# Patient Record
Sex: MALE | Race: WHITE | Employment: FULL TIME
[De-identification: names, ages, dates, MRNs, and addresses within clinical notes are randomized per-mention and may not be internally consistent; named-entity substitution may affect disease eponyms.]

---

## 2017-01-06 ENCOUNTER — OFFICE VISIT (OUTPATIENT)
Dept: FAMILY MEDICINE CLINIC | Facility: CLINIC | Age: 57
End: 2017-01-06

## 2017-01-06 VITALS — OXYGEN SATURATION: 98 % | SYSTOLIC BLOOD PRESSURE: 132 MMHG | DIASTOLIC BLOOD PRESSURE: 92 MMHG | HEART RATE: 82 BPM

## 2017-01-06 DIAGNOSIS — N43.3 HYDROCELE IN ADULT: ICD-10-CM

## 2017-01-06 DIAGNOSIS — J40 BRONCHITIS: Primary | ICD-10-CM

## 2017-01-06 PROCEDURE — 99213 OFFICE O/P EST LOW 20 MIN: CPT | Performed by: FAMILY MEDICINE

## 2017-01-06 RX ORDER — AZITHROMYCIN 250 MG/1
TABLET, FILM COATED ORAL
Qty: 1 PACKET | Refills: 0 | Status: SHIPPED | OUTPATIENT
Start: 2017-01-06 | End: 2017-06-26 | Stop reason: ALTCHOICE

## 2017-01-06 ASSESSMENT — ENCOUNTER SYMPTOMS
WHEEZING: 0
DIARRHEA: 0
CONSTIPATION: 0
COUGH: 1
ABDOMINAL PAIN: 0
SINUS PRESSURE: 1
SORE THROAT: 1
SHORTNESS OF BREATH: 1
BACK PAIN: 0
BLOOD IN STOOL: 0

## 2017-01-24 ENCOUNTER — OFFICE VISIT (OUTPATIENT)
Dept: FAMILY MEDICINE CLINIC | Facility: CLINIC | Age: 57
End: 2017-01-24

## 2017-01-24 VITALS
SYSTOLIC BLOOD PRESSURE: 134 MMHG | HEART RATE: 93 BPM | DIASTOLIC BLOOD PRESSURE: 86 MMHG | BODY MASS INDEX: 31.71 KG/M2 | WEIGHT: 247 LBS

## 2017-01-24 DIAGNOSIS — G89.29 CHRONIC LEFT-SIDED LOW BACK PAIN WITH LEFT-SIDED SCIATICA: Primary | ICD-10-CM

## 2017-01-24 DIAGNOSIS — M54.42 CHRONIC LEFT-SIDED LOW BACK PAIN WITH LEFT-SIDED SCIATICA: Primary | ICD-10-CM

## 2017-01-24 DIAGNOSIS — E78.5 DYSLIPIDEMIA: ICD-10-CM

## 2017-01-24 PROCEDURE — 3017F COLORECTAL CA SCREEN DOC REV: CPT | Performed by: FAMILY MEDICINE

## 2017-01-24 PROCEDURE — G8427 DOCREV CUR MEDS BY ELIG CLIN: HCPCS | Performed by: FAMILY MEDICINE

## 2017-01-24 PROCEDURE — G8419 CALC BMI OUT NRM PARAM NOF/U: HCPCS | Performed by: FAMILY MEDICINE

## 2017-01-24 PROCEDURE — 1036F TOBACCO NON-USER: CPT | Performed by: FAMILY MEDICINE

## 2017-01-24 PROCEDURE — 99213 OFFICE O/P EST LOW 20 MIN: CPT | Performed by: FAMILY MEDICINE

## 2017-01-24 PROCEDURE — G8484 FLU IMMUNIZE NO ADMIN: HCPCS | Performed by: FAMILY MEDICINE

## 2017-01-24 PROCEDURE — 96372 THER/PROPH/DIAG INJ SC/IM: CPT | Performed by: FAMILY MEDICINE

## 2017-01-24 RX ORDER — PREDNISONE 20 MG/1
TABLET ORAL
Qty: 20 TABLET | Refills: 0 | Status: SHIPPED | OUTPATIENT
Start: 2017-01-24 | End: 2017-06-26 | Stop reason: ALTCHOICE

## 2017-01-24 RX ORDER — OXYCODONE HYDROCHLORIDE AND ACETAMINOPHEN 5; 325 MG/1; MG/1
1 TABLET ORAL EVERY 4 HOURS PRN
Qty: 40 TABLET | Refills: 0 | Status: SHIPPED | OUTPATIENT
Start: 2017-01-24 | End: 2017-11-21 | Stop reason: SDUPTHER

## 2017-01-24 RX ORDER — KETOROLAC TROMETHAMINE 30 MG/ML
60 INJECTION, SOLUTION INTRAMUSCULAR; INTRAVENOUS ONCE
Status: COMPLETED | OUTPATIENT
Start: 2017-01-24 | End: 2017-01-24

## 2017-01-24 RX ADMIN — KETOROLAC TROMETHAMINE 60 MG: 30 INJECTION, SOLUTION INTRAMUSCULAR; INTRAVENOUS at 16:00

## 2017-01-24 ASSESSMENT — ENCOUNTER SYMPTOMS
WHEEZING: 0
SHORTNESS OF BREATH: 0
BLOOD IN STOOL: 0
CONSTIPATION: 0
BACK PAIN: 1
COUGH: 0
DIARRHEA: 0
ABDOMINAL PAIN: 0

## 2017-01-30 ENCOUNTER — OFFICE VISIT (OUTPATIENT)
Dept: FAMILY MEDICINE CLINIC | Facility: CLINIC | Age: 57
End: 2017-01-30

## 2017-01-30 VITALS
DIASTOLIC BLOOD PRESSURE: 88 MMHG | SYSTOLIC BLOOD PRESSURE: 124 MMHG | HEART RATE: 72 BPM | OXYGEN SATURATION: 96 % | TEMPERATURE: 98.2 F

## 2017-01-30 DIAGNOSIS — J01.00 ACUTE MAXILLARY SINUSITIS, RECURRENCE NOT SPECIFIED: Primary | ICD-10-CM

## 2017-01-30 PROCEDURE — 99213 OFFICE O/P EST LOW 20 MIN: CPT | Performed by: FAMILY MEDICINE

## 2017-01-30 PROCEDURE — 3017F COLORECTAL CA SCREEN DOC REV: CPT | Performed by: FAMILY MEDICINE

## 2017-01-30 PROCEDURE — G8427 DOCREV CUR MEDS BY ELIG CLIN: HCPCS | Performed by: FAMILY MEDICINE

## 2017-01-30 PROCEDURE — G8484 FLU IMMUNIZE NO ADMIN: HCPCS | Performed by: FAMILY MEDICINE

## 2017-01-30 PROCEDURE — 1036F TOBACCO NON-USER: CPT | Performed by: FAMILY MEDICINE

## 2017-01-30 PROCEDURE — G8419 CALC BMI OUT NRM PARAM NOF/U: HCPCS | Performed by: FAMILY MEDICINE

## 2017-01-30 RX ORDER — AMOXICILLIN 875 MG/1
875 TABLET, COATED ORAL 2 TIMES DAILY
Qty: 20 TABLET | Refills: 0 | Status: SHIPPED | OUTPATIENT
Start: 2017-01-30 | End: 2017-02-09

## 2017-01-30 ASSESSMENT — ENCOUNTER SYMPTOMS
BACK PAIN: 0
SORE THROAT: 1
BLOOD IN STOOL: 0
VOICE CHANGE: 0
RHINORRHEA: 1
CONSTIPATION: 0
SINUS PRESSURE: 1
ABDOMINAL PAIN: 0
COUGH: 0
WHEEZING: 0
SHORTNESS OF BREATH: 0
DIARRHEA: 0

## 2017-02-13 DIAGNOSIS — G43.009 MIGRAINE WITHOUT AURA AND WITHOUT STATUS MIGRAINOSUS, NOT INTRACTABLE: ICD-10-CM

## 2017-02-13 RX ORDER — SUMATRIPTAN 100 MG/1
TABLET, FILM COATED ORAL
Qty: 9 TABLET | Refills: 3 | Status: SHIPPED | OUTPATIENT
Start: 2017-02-13 | End: 2017-04-04 | Stop reason: SDUPTHER

## 2017-04-04 DIAGNOSIS — G43.009 MIGRAINE WITHOUT AURA AND WITHOUT STATUS MIGRAINOSUS, NOT INTRACTABLE: ICD-10-CM

## 2017-04-05 RX ORDER — SUMATRIPTAN 100 MG/1
TABLET, FILM COATED ORAL
Qty: 9 TABLET | Refills: 3 | Status: SHIPPED | OUTPATIENT
Start: 2017-04-05 | End: 2017-05-14 | Stop reason: SDUPTHER

## 2017-05-14 DIAGNOSIS — G43.009 MIGRAINE WITHOUT AURA AND WITHOUT STATUS MIGRAINOSUS, NOT INTRACTABLE: ICD-10-CM

## 2017-05-15 RX ORDER — SUMATRIPTAN 100 MG/1
TABLET, FILM COATED ORAL
Qty: 9 TABLET | Refills: 3 | Status: SHIPPED | OUTPATIENT
Start: 2017-05-15 | End: 2017-06-25 | Stop reason: SDUPTHER

## 2017-06-25 DIAGNOSIS — G43.009 MIGRAINE WITHOUT AURA AND WITHOUT STATUS MIGRAINOSUS, NOT INTRACTABLE: ICD-10-CM

## 2017-06-26 ENCOUNTER — OFFICE VISIT (OUTPATIENT)
Dept: FAMILY MEDICINE CLINIC | Age: 57
End: 2017-06-26
Payer: COMMERCIAL

## 2017-06-26 VITALS
HEART RATE: 77 BPM | BODY MASS INDEX: 30.16 KG/M2 | WEIGHT: 235 LBS | SYSTOLIC BLOOD PRESSURE: 120 MMHG | HEIGHT: 74 IN | DIASTOLIC BLOOD PRESSURE: 68 MMHG

## 2017-06-26 DIAGNOSIS — R51.9 CHRONIC DAILY HEADACHE: Primary | ICD-10-CM

## 2017-06-26 PROCEDURE — 99213 OFFICE O/P EST LOW 20 MIN: CPT | Performed by: FAMILY MEDICINE

## 2017-06-26 RX ORDER — TOPIRAMATE 50 MG/1
50 TABLET, FILM COATED ORAL DAILY
Qty: 30 TABLET | Refills: 3 | Status: SHIPPED | OUTPATIENT
Start: 2017-06-26 | End: 2017-08-29 | Stop reason: SDUPTHER

## 2017-06-26 RX ORDER — SUMATRIPTAN 100 MG/1
TABLET, FILM COATED ORAL
Qty: 9 TABLET | Refills: 3 | Status: SHIPPED | OUTPATIENT
Start: 2017-06-26 | End: 2017-08-07 | Stop reason: SDUPTHER

## 2017-06-26 ASSESSMENT — ENCOUNTER SYMPTOMS
SHORTNESS OF BREATH: 0
DIARRHEA: 0
ABDOMINAL PAIN: 0
VOMITING: 1
BACK PAIN: 0
COUGH: 0
WHEEZING: 0
BLOOD IN STOOL: 0
NAUSEA: 1
CONSTIPATION: 0
PHOTOPHOBIA: 1

## 2017-07-05 RX ORDER — ATORVASTATIN CALCIUM 20 MG/1
TABLET, FILM COATED ORAL
Qty: 90 TABLET | Refills: 3 | Status: SHIPPED | OUTPATIENT
Start: 2017-07-05 | End: 2018-09-24 | Stop reason: SDUPTHER

## 2017-08-07 DIAGNOSIS — G43.009 MIGRAINE WITHOUT AURA AND WITHOUT STATUS MIGRAINOSUS, NOT INTRACTABLE: ICD-10-CM

## 2017-08-08 RX ORDER — SUMATRIPTAN 100 MG/1
TABLET, FILM COATED ORAL
Qty: 9 TABLET | Refills: 3 | Status: SHIPPED | OUTPATIENT
Start: 2017-08-08 | End: 2017-09-25 | Stop reason: SDUPTHER

## 2017-08-29 DIAGNOSIS — R51.9 CHRONIC DAILY HEADACHE: ICD-10-CM

## 2017-08-29 RX ORDER — TOPIRAMATE 50 MG/1
50 TABLET, FILM COATED ORAL DAILY
Qty: 90 TABLET | Refills: 3 | Status: SHIPPED | OUTPATIENT
Start: 2017-08-29 | End: 2018-10-28 | Stop reason: SDUPTHER

## 2017-09-25 DIAGNOSIS — G43.009 MIGRAINE WITHOUT AURA AND WITHOUT STATUS MIGRAINOSUS, NOT INTRACTABLE: ICD-10-CM

## 2017-09-25 RX ORDER — SUMATRIPTAN 100 MG/1
TABLET, FILM COATED ORAL
Qty: 9 TABLET | Refills: 3 | Status: SHIPPED | OUTPATIENT
Start: 2017-09-25 | End: 2017-11-05 | Stop reason: SDUPTHER

## 2017-11-05 DIAGNOSIS — G43.009 MIGRAINE WITHOUT AURA AND WITHOUT STATUS MIGRAINOSUS, NOT INTRACTABLE: ICD-10-CM

## 2017-11-06 RX ORDER — SUMATRIPTAN 100 MG/1
TABLET, FILM COATED ORAL
Qty: 9 TABLET | Refills: 3 | Status: SHIPPED | OUTPATIENT
Start: 2017-11-06 | End: 2017-12-16 | Stop reason: SDUPTHER

## 2017-11-06 NOTE — TELEPHONE ENCOUNTER
Health Maintenance   Topic Date Due    Hepatitis C screen  1960    HIV screen  04/02/1977    DTaP/Tdap/Td vaccine (1 - Tdap) 04/02/1981    Flu vaccine (1) 09/01/2017    Lipid screen  05/25/2021    Colon cancer screen colonoscopy  06/06/2025       No results found for: LABA1C          ( goal A1C is < 7)   No results found for: LABMICR  LDL Cholesterol (mg/dL)   Date Value   07/23/2013 178 (H)     LDL Calculated (mg/dL)   Date Value   05/25/2016 115       (goal LDL is <100)   AST (U/L)   Date Value   05/25/2016 19     ALT (U/L)   Date Value   05/25/2016 26     BUN (mg/dL)   Date Value   05/25/2016 17     BP Readings from Last 3 Encounters:   06/26/17 120/68   01/30/17 124/88   01/24/17 134/86          (goal 120/80)    All Future Testing planned in [facility]  Lab Frequency Next Occurrence   CBC Auto Differential Once 12/31/2017   Comprehensive Metabolic Panel Once 47/66/2304   Lipid Panel Once 12/31/2017   Psa screening Once 12/31/2017   XR CERVICAL SPINE STANDARD Once 10/31/2017       Next Visit Date:  No future appointments.          Patient Active Problem List:     Dyslipidemia     Pneumoperitoneum     Diverticulitis of intestine with perforation     Parastomal hernia ([facility] [address]

## 2017-11-21 ENCOUNTER — OFFICE VISIT (OUTPATIENT)
Dept: FAMILY MEDICINE CLINIC | Age: 57
End: 2017-11-21
Payer: COMMERCIAL

## 2017-11-21 VITALS
BODY MASS INDEX: 29.92 KG/M2 | HEART RATE: 76 BPM | DIASTOLIC BLOOD PRESSURE: 82 MMHG | WEIGHT: 233 LBS | SYSTOLIC BLOOD PRESSURE: 136 MMHG

## 2017-11-21 DIAGNOSIS — G89.29 CHRONIC LEFT-SIDED LOW BACK PAIN WITH LEFT-SIDED SCIATICA: ICD-10-CM

## 2017-11-21 DIAGNOSIS — M54.42 CHRONIC LEFT-SIDED LOW BACK PAIN WITH LEFT-SIDED SCIATICA: ICD-10-CM

## 2017-11-21 PROCEDURE — 96372 THER/PROPH/DIAG INJ SC/IM: CPT | Performed by: FAMILY MEDICINE

## 2017-11-21 PROCEDURE — 99213 OFFICE O/P EST LOW 20 MIN: CPT | Performed by: FAMILY MEDICINE

## 2017-11-21 RX ORDER — OXYCODONE HYDROCHLORIDE AND ACETAMINOPHEN 5; 325 MG/1; MG/1
1 TABLET ORAL EVERY 4 HOURS PRN
Qty: 40 TABLET | Refills: 0 | Status: SHIPPED | OUTPATIENT
Start: 2017-11-21 | End: 2018-04-20 | Stop reason: SDUPTHER

## 2017-11-21 RX ORDER — KETOROLAC TROMETHAMINE 30 MG/ML
60 INJECTION, SOLUTION INTRAMUSCULAR; INTRAVENOUS ONCE
Status: COMPLETED | OUTPATIENT
Start: 2017-11-21 | End: 2017-11-21

## 2017-11-21 RX ORDER — PREDNISONE 20 MG/1
TABLET ORAL
Qty: 20 TABLET | Refills: 0 | Status: SHIPPED | OUTPATIENT
Start: 2017-11-21 | End: 2018-01-26 | Stop reason: ALTCHOICE

## 2017-11-21 RX ADMIN — KETOROLAC TROMETHAMINE 60 MG: 30 INJECTION, SOLUTION INTRAMUSCULAR; INTRAVENOUS at 09:11

## 2017-11-21 ASSESSMENT — PATIENT HEALTH QUESTIONNAIRE - PHQ9
SUM OF ALL RESPONSES TO PHQ9 QUESTIONS 1 & 2: 0
2. FEELING DOWN, DEPRESSED OR HOPELESS: 0
SUM OF ALL RESPONSES TO PHQ QUESTIONS 1-9: 0
1. LITTLE INTEREST OR PLEASURE IN DOING THINGS: 0

## 2017-11-21 ASSESSMENT — ENCOUNTER SYMPTOMS
DIARRHEA: 0
BLOOD IN STOOL: 0
WHEEZING: 0
ABDOMINAL PAIN: 0
SHORTNESS OF BREATH: 0
BACK PAIN: 1
COUGH: 0
CONSTIPATION: 0

## 2017-11-21 NOTE — PROGRESS NOTES
Jenny Warner is a 56 y.o. male who presents today for his medical conditions/complaints as noted below. Jenny Warner is c/o of Back Pain  Exacerbation of his low back pain and radicular sx down his LLE similar to past events. Car ride to see grandchildren in [place] seems to have exacerbated his sx this time. Very painful car ride yesterday from his visit. HPI:     Visit Information    Have you changed or started any medications since your last visit including any over-the-counter medicines, vitamins, or herbal medicines? no   Are you having any side effects from any of your medications? -  no  Have you stopped taking any of your medications? Is so, why? -  no    Have you seen any other physician or provider since your last visit? No  Have you had any other diagnostic tests since your last visit? No  Have you been seen in the emergency room and/or had an admission to a hospital since we last saw you? No  Have you had your routine dental cleaning in the past 6 months? yes -     Have you activated your [employer] account? If not, what are your barriers?  Yes     Patient Care Team:  Rehana Sterling MD as PCP - General (Family Medicine)  Elisabeth Mercado DO as Surgeon (General Surgery)    Medical History Review  Past Medical, Family, and Social History reviewed and  contribute to the patient presenting condition    Health Maintenance   Topic Date Due    Hepatitis C screen  1960    HIV screen  04/02/1977    DTaP/Tdap/Td vaccine (1 - Tdap) 04/02/1981    Lipid screen  05/25/2021    Colon cancer screen colonoscopy  06/06/2025    Flu vaccine  Completed       Past Medical History:   Diagnosis Date    Diverticular disease of colon       Past Surgical History:   Procedure Laterality Date    COLECTOMY  2/1/15        HERNIA REPAIR  1990's    JOINT REPLACEMENT  2009    L TKA    REVISION COLOSTOMY      SIGMOIDECTOMY  2/1/15    [facility]/ Dr. Dinesh Connelly     Family History   Problem Relation Age of Onset    Cancer Mother      Breast CA     Social History   Substance Use Topics    Smoking status: Former Smoker     Quit date: 10/1/2008    Smokeless tobacco: Never Used    Alcohol use Yes      Comment: few days a week      Current Outpatient Prescriptions   Medication Sig Dispense Refill    oxyCODONE-acetaminophen (PERCOCET) 5-325 MG per tablet Take 1 tablet by mouth every 4 hours as needed for Pain . 40 tablet 0    predniSONE (DELTASONE) 20 MG tablet Take 3 tabs daily for 3 days, then 2 a day for 3 days, then 1 a day for 3 days, then 1/2 a day for 3 days. 20 tablet 0    SUMAtriptan (IMITREX) 100 MG tablet take 1 tablet by mouth once daily if needed for MIGRAINES 9 tablet 3    topiramate (TOPAMAX) 50 MG tablet Take 1 tablet by mouth daily 90 tablet 3    atorvastatin (LIPITOR) 20 MG tablet TAKE 1 TABLET DAILY 90 tablet 3     Current Facility-Administered Medications   Medication Dose Route Frequency Provider Last Rate Last Dose    ketorolac (TORADOL) injection 60 mg  60 mg Intramuscular Once Wendy Reyes MD         No Known Allergies      Subjective:   Review of Systems   Constitutional: Negative for chills, diaphoresis, fatigue and fever. HENT: Negative for congestion and hearing loss. Eyes: Negative for visual disturbance. Respiratory: Negative for cough, shortness of breath and wheezing. Cardiovascular: Negative for chest pain, palpitations and leg swelling. Gastrointestinal: Negative for abdominal pain, blood in stool, constipation and diarrhea. Genitourinary: Negative for dysuria. Musculoskeletal: Positive for back pain, gait problem and myalgias. Negative for arthralgias and neck pain. Skin: Negative for rash. Neurological: Negative for weakness, numbness and headaches. Psychiatric/Behavioral: Positive for sleep disturbance. Negative for dysphoric mood.        Objective:   /82   Pulse 76   Wt 233 lb (105.7 kg)   BMI 29.92 kg/m²     Physical Exam Constitutional: He is oriented to person, place, and time. He appears well-developed and well-nourished. No distress. HENT:   Head: Normocephalic and atraumatic. Mouth/Throat: No oropharyngeal exudate. Eyes: Right eye exhibits no discharge. Left eye exhibits no discharge. No scleral icterus. Neck: Neck supple. Carotid bruit is not present. No thyromegaly present. Cardiovascular: Normal rate, regular rhythm and normal heart sounds. Exam reveals no gallop and no friction rub. No murmur heard. Pulmonary/Chest: Breath sounds normal. No respiratory distress. He has no wheezes. He has no rales. He exhibits no tenderness. Abdominal: There is no tenderness. Musculoskeletal: He exhibits tenderness. He exhibits no edema. Lymphadenopathy:     He has no cervical adenopathy. Neurological: He is alert and oriented to person, place, and time. No cranial nerve deficit. Coordination normal.   Reflex Scores:       Patellar reflexes are 1+ on the right side and 1+ on the left side.  + SLR on the left. Non tender lumbar musculature. Skin: No rash noted. He is not diaphoretic. Psychiatric: He has a normal mood and affect. His behavior is normal. Judgment and thought content normal.       Assessment:      1. Chronic left-sided low back pain with left-sided sciatica  oxyCODONE-acetaminophen (PERCOCET) 5-325 MG per tablet    predniSONE (DELTASONE) 20 MG tablet    ketorolac (TORADOL) injection 60 mg         Plan:      No Follow-up on file. No orders of the defined types were placed in this encounter. Orders Placed This Encounter   Medications    oxyCODONE-acetaminophen (PERCOCET) 5-325 MG per tablet     Sig: Take 1 tablet by mouth every 4 hours as needed for Pain . Dispense:  40 tablet     Refill:  0    predniSONE (DELTASONE) 20 MG tablet     Sig: Take 3 tabs daily for 3 days, then 2 a day for 3 days, then 1 a day for 3 days, then 1/2 a day for 3 days.      Dispense:  20 tablet     Refill:  0   

## 2017-12-16 DIAGNOSIS — G43.009 MIGRAINE WITHOUT AURA AND WITHOUT STATUS MIGRAINOSUS, NOT INTRACTABLE: ICD-10-CM

## 2017-12-18 RX ORDER — SUMATRIPTAN 100 MG/1
TABLET, FILM COATED ORAL
Qty: 9 TABLET | Refills: 1 | Status: SHIPPED | OUTPATIENT
Start: 2017-12-18 | End: 2018-01-08 | Stop reason: SDUPTHER

## 2018-01-08 DIAGNOSIS — G43.009 MIGRAINE WITHOUT AURA AND WITHOUT STATUS MIGRAINOSUS, NOT INTRACTABLE: ICD-10-CM

## 2018-01-08 RX ORDER — SUMATRIPTAN 100 MG/1
TABLET, FILM COATED ORAL
Qty: 9 TABLET | Refills: 1 | Status: SHIPPED | OUTPATIENT
Start: 2018-01-08 | End: 2018-01-26 | Stop reason: SDUPTHER

## 2018-01-08 NOTE — TELEPHONE ENCOUNTER
Health Maintenance   Topic Date Due    Hepatitis C screen  1960    HIV screen  04/02/1977    DTaP/Tdap/Td vaccine (1 - Tdap) 04/02/1981    Lipid screen  05/25/2021    Colon cancer screen colonoscopy  06/06/2025    Flu vaccine  Completed       No results found for: LABA1C          ( goal A1C is < 7)   No results found for: LABMICR  LDL Cholesterol (mg/dL)   Date Value   07/23/2013 178 (H)     LDL Calculated (mg/dL)   Date Value   05/25/2016 115       (goal LDL is <100)   AST (U/L)   Date Value   05/25/2016 19     ALT (U/L)   Date Value   05/25/2016 26     BUN (mg/dL)   Date Value   05/25/2016 17     BP Readings from Last 3 Encounters:   11/21/17 136/82   06/26/17 120/68   01/30/17 124/88          (goal 120/80)    All Future Testing planned in [facility]  Lab Frequency Next Occurrence   CBC Auto Differential Once 12/31/2017   Comprehensive Metabolic Panel Once 18/36/9066   Lipid Panel Once 12/31/2017   Psa screening Once 12/31/2017   XR CERVICAL SPINE STANDARD Once 10/31/2017       Next Visit Date:  No future appointments.          Patient Active Problem List:     Dyslipidemia     Pneumoperitoneum     Diverticulitis of intestine with perforation     Parastomal hernia ([facility] [address]

## 2018-01-26 ENCOUNTER — OFFICE VISIT (OUTPATIENT)
Dept: FAMILY MEDICINE CLINIC | Age: 58
End: 2018-01-26
Payer: COMMERCIAL

## 2018-01-26 VITALS
HEIGHT: 74 IN | HEART RATE: 92 BPM | WEIGHT: 232 LBS | DIASTOLIC BLOOD PRESSURE: 78 MMHG | SYSTOLIC BLOOD PRESSURE: 130 MMHG | BODY MASS INDEX: 29.77 KG/M2

## 2018-01-26 DIAGNOSIS — G43.009 MIGRAINE WITHOUT AURA AND WITHOUT STATUS MIGRAINOSUS, NOT INTRACTABLE: ICD-10-CM

## 2018-01-26 DIAGNOSIS — J40 BRONCHITIS: Primary | ICD-10-CM

## 2018-01-26 PROCEDURE — 99213 OFFICE O/P EST LOW 20 MIN: CPT | Performed by: FAMILY MEDICINE

## 2018-01-26 RX ORDER — AZITHROMYCIN 250 MG/1
TABLET, FILM COATED ORAL
Qty: 1 PACKET | Refills: 0 | Status: SHIPPED | OUTPATIENT
Start: 2018-01-26 | End: 2018-03-07 | Stop reason: ALTCHOICE

## 2018-01-26 RX ORDER — SUMATRIPTAN 100 MG/1
TABLET, FILM COATED ORAL
Qty: 9 TABLET | Refills: 1 | Status: SHIPPED | OUTPATIENT
Start: 2018-01-26 | End: 2018-01-26 | Stop reason: SDUPTHER

## 2018-01-26 RX ORDER — SUMATRIPTAN 100 MG/1
TABLET, FILM COATED ORAL
Qty: 9 TABLET | Refills: 3 | Status: SHIPPED | OUTPATIENT
Start: 2018-01-26 | End: 2018-04-20 | Stop reason: SDUPTHER

## 2018-01-26 ASSESSMENT — ENCOUNTER SYMPTOMS
SHORTNESS OF BREATH: 1
BLOOD IN STOOL: 0
SORE THROAT: 1
WHEEZING: 0
SINUS PAIN: 1
ABDOMINAL PAIN: 0
VOICE CHANGE: 1
BACK PAIN: 0
RHINORRHEA: 1
COUGH: 1
SINUS PRESSURE: 1
CONSTIPATION: 0
DIARRHEA: 0

## 2018-01-26 NOTE — PROGRESS NOTES
Lashell Oliva is a 56 y.o. male who presents today for his medical conditions/complaints as noted below. Lashell Oliva is c/o of Cough and Congestion    Worsening cough and cold sx. HPI:     Visit Information    Have you changed or started any medications since your last visit including any over-the-counter medicines, vitamins, or herbal medicines? no   Are you having any side effects from any of your medications? -  no  Have you stopped taking any of your medications? Is so, why? -  no    Have you seen any other physician or provider since your last visit? No  Have you had any other diagnostic tests since your last visit? No  Have you been seen in the emergency room and/or had an admission to a hospital since we last saw you? No  Have you had your routine dental cleaning in the past 6 months? yes -     Have you activated your [employer] account? If not, what are your barriers?  Yes     Patient Care Team:  Wm Rebollar MD as PCP - General (Family Medicine)  Nate Abbott DO as Surgeon (General Surgery)    Medical History Review  Past Medical, Family, and Social History reviewed and  contribute to the patient presenting condition    Health Maintenance   Topic Date Due    Hepatitis C screen  1960    HIV screen  04/02/1977    DTaP/Tdap/Td vaccine (1 - Tdap) 04/02/1981    Lipid screen  05/25/2021    Colon cancer screen colonoscopy  06/06/2025    Flu vaccine  Completed       Past Medical History:   Diagnosis Date    Diverticular disease of colon       Past Surgical History:   Procedure Laterality Date    COLECTOMY  2/1/15        HERNIA REPAIR  1990's    JOINT REPLACEMENT  2009    L TKA    REVISION COLOSTOMY      SIGMOIDECTOMY  2/1/15    [facility]/ Dr. Marivel Junior     Family History   Problem Relation Age of Onset    Cancer Mother      Breast CA     Social History   Substance Use Topics    Smoking status: Former Smoker     Quit date: 10/1/2008    Smokeless tobacco: Never

## 2018-02-23 DIAGNOSIS — G43.009 MIGRAINE WITHOUT AURA AND WITHOUT STATUS MIGRAINOSUS, NOT INTRACTABLE: ICD-10-CM

## 2018-02-26 RX ORDER — SUMATRIPTAN 100 MG/1
TABLET, FILM COATED ORAL
Qty: 9 TABLET | Refills: 1 | Status: SHIPPED | OUTPATIENT
Start: 2018-02-26 | End: 2018-03-07 | Stop reason: SDUPTHER

## 2018-03-07 ENCOUNTER — OFFICE VISIT (OUTPATIENT)
Dept: FAMILY MEDICINE CLINIC | Age: 58
End: 2018-03-07
Payer: COMMERCIAL

## 2018-03-07 VITALS
HEART RATE: 78 BPM | WEIGHT: 232.5 LBS | DIASTOLIC BLOOD PRESSURE: 90 MMHG | OXYGEN SATURATION: 98 % | SYSTOLIC BLOOD PRESSURE: 110 MMHG | BODY MASS INDEX: 29.85 KG/M2

## 2018-03-07 DIAGNOSIS — M54.42 ACUTE LEFT-SIDED LOW BACK PAIN WITH LEFT-SIDED SCIATICA: Primary | ICD-10-CM

## 2018-03-07 PROCEDURE — 99213 OFFICE O/P EST LOW 20 MIN: CPT | Performed by: FAMILY MEDICINE

## 2018-03-07 PROCEDURE — 96372 THER/PROPH/DIAG INJ SC/IM: CPT | Performed by: FAMILY MEDICINE

## 2018-03-07 RX ORDER — DICLOFENAC SODIUM 75 MG/1
75 TABLET, DELAYED RELEASE ORAL 2 TIMES DAILY WITH MEALS
Qty: 60 TABLET | Refills: 11 | Status: SHIPPED | OUTPATIENT
Start: 2018-03-07 | End: 2020-11-12

## 2018-03-07 RX ORDER — KETOROLAC TROMETHAMINE 30 MG/ML
60 INJECTION, SOLUTION INTRAMUSCULAR; INTRAVENOUS ONCE
Status: COMPLETED | OUTPATIENT
Start: 2018-03-07 | End: 2018-03-07

## 2018-03-07 RX ORDER — CYCLOBENZAPRINE HCL 10 MG
10 TABLET ORAL 3 TIMES DAILY PRN
Qty: 30 TABLET | Refills: 0 | Status: SHIPPED | OUTPATIENT
Start: 2018-03-07 | End: 2018-03-17

## 2018-03-07 RX ORDER — PREDNISONE 20 MG/1
TABLET ORAL
Qty: 18 TABLET | Refills: 0 | Status: SHIPPED | OUTPATIENT
Start: 2018-03-07 | End: 2018-03-17

## 2018-03-07 RX ADMIN — KETOROLAC TROMETHAMINE 60 MG: 30 INJECTION, SOLUTION INTRAMUSCULAR; INTRAVENOUS at 14:21

## 2018-03-18 DIAGNOSIS — G43.009 MIGRAINE WITHOUT AURA AND WITHOUT STATUS MIGRAINOSUS, NOT INTRACTABLE: ICD-10-CM

## 2018-03-19 RX ORDER — SUMATRIPTAN 100 MG/1
TABLET, FILM COATED ORAL
Qty: 9 TABLET | Refills: 1 | Status: SHIPPED | OUTPATIENT
Start: 2018-03-19 | End: 2018-04-12 | Stop reason: SDUPTHER

## 2018-04-12 DIAGNOSIS — G43.009 MIGRAINE WITHOUT AURA AND WITHOUT STATUS MIGRAINOSUS, NOT INTRACTABLE: ICD-10-CM

## 2018-04-12 RX ORDER — SUMATRIPTAN 100 MG/1
TABLET, FILM COATED ORAL
Qty: 9 TABLET | Refills: 1 | Status: SHIPPED | OUTPATIENT
Start: 2018-04-12 | End: 2018-04-20 | Stop reason: SDUPTHER

## 2018-04-20 ENCOUNTER — OFFICE VISIT (OUTPATIENT)
Dept: FAMILY MEDICINE CLINIC | Age: 58
End: 2018-04-20
Payer: COMMERCIAL

## 2018-04-20 VITALS
WEIGHT: 230 LBS | SYSTOLIC BLOOD PRESSURE: 120 MMHG | DIASTOLIC BLOOD PRESSURE: 70 MMHG | BODY MASS INDEX: 29.53 KG/M2 | HEART RATE: 83 BPM

## 2018-04-20 DIAGNOSIS — M54.59 MECHANICAL LOW BACK PAIN: Primary | ICD-10-CM

## 2018-04-20 DIAGNOSIS — G89.29 CHRONIC LEFT-SIDED LOW BACK PAIN WITH LEFT-SIDED SCIATICA: ICD-10-CM

## 2018-04-20 DIAGNOSIS — M54.42 CHRONIC LEFT-SIDED LOW BACK PAIN WITH LEFT-SIDED SCIATICA: ICD-10-CM

## 2018-04-20 DIAGNOSIS — G43.009 MIGRAINE WITHOUT AURA AND WITHOUT STATUS MIGRAINOSUS, NOT INTRACTABLE: ICD-10-CM

## 2018-04-20 DIAGNOSIS — E78.5 DYSLIPIDEMIA: ICD-10-CM

## 2018-04-20 PROCEDURE — 99213 OFFICE O/P EST LOW 20 MIN: CPT | Performed by: FAMILY MEDICINE

## 2018-04-20 PROCEDURE — 96372 THER/PROPH/DIAG INJ SC/IM: CPT | Performed by: FAMILY MEDICINE

## 2018-04-20 RX ORDER — PREDNISONE 20 MG/1
TABLET ORAL
Qty: 20 TABLET | Refills: 0 | Status: SHIPPED | OUTPATIENT
Start: 2018-04-20 | End: 2018-09-12 | Stop reason: ALTCHOICE

## 2018-04-20 RX ORDER — OXYCODONE HYDROCHLORIDE AND ACETAMINOPHEN 5; 325 MG/1; MG/1
1 TABLET ORAL EVERY 4 HOURS PRN
Qty: 40 TABLET | Refills: 0 | Status: SHIPPED | OUTPATIENT
Start: 2018-04-20 | End: 2018-05-20

## 2018-04-20 RX ORDER — SUMATRIPTAN 100 MG/1
TABLET, FILM COATED ORAL
Qty: 9 TABLET | Refills: 1 | Status: SHIPPED | OUTPATIENT
Start: 2018-04-20 | End: 2018-05-07 | Stop reason: SDUPTHER

## 2018-04-20 RX ORDER — KETOROLAC TROMETHAMINE 30 MG/ML
60 INJECTION, SOLUTION INTRAMUSCULAR; INTRAVENOUS ONCE
Status: COMPLETED | OUTPATIENT
Start: 2018-04-20 | End: 2018-04-20

## 2018-04-20 RX ADMIN — KETOROLAC TROMETHAMINE 60 MG: 30 INJECTION, SOLUTION INTRAMUSCULAR; INTRAVENOUS at 11:50

## 2018-04-20 ASSESSMENT — ENCOUNTER SYMPTOMS
DIARRHEA: 0
ABDOMINAL PAIN: 0
WHEEZING: 0
BLOOD IN STOOL: 0
COUGH: 0
SHORTNESS OF BREATH: 0
CONSTIPATION: 0
BACK PAIN: 1

## 2018-05-07 DIAGNOSIS — G43.009 MIGRAINE WITHOUT AURA AND WITHOUT STATUS MIGRAINOSUS, NOT INTRACTABLE: ICD-10-CM

## 2018-05-08 RX ORDER — SUMATRIPTAN 100 MG/1
TABLET, FILM COATED ORAL
Qty: 9 TABLET | Refills: 1 | Status: SHIPPED | OUTPATIENT
Start: 2018-05-08 | End: 2018-05-29 | Stop reason: SDUPTHER

## 2018-05-29 DIAGNOSIS — G43.009 MIGRAINE WITHOUT AURA AND WITHOUT STATUS MIGRAINOSUS, NOT INTRACTABLE: ICD-10-CM

## 2018-05-29 RX ORDER — SUMATRIPTAN 100 MG/1
TABLET, FILM COATED ORAL
Qty: 9 TABLET | Refills: 1 | Status: SHIPPED | OUTPATIENT
Start: 2018-05-29 | End: 2018-06-26 | Stop reason: SDUPTHER

## 2018-06-26 DIAGNOSIS — G43.009 MIGRAINE WITHOUT AURA AND WITHOUT STATUS MIGRAINOSUS, NOT INTRACTABLE: ICD-10-CM

## 2018-06-27 RX ORDER — SUMATRIPTAN 100 MG/1
TABLET, FILM COATED ORAL
Qty: 9 TABLET | Refills: 1 | Status: SHIPPED | OUTPATIENT
Start: 2018-06-27 | End: 2018-07-24 | Stop reason: SDUPTHER

## 2018-07-24 DIAGNOSIS — G43.009 MIGRAINE WITHOUT AURA AND WITHOUT STATUS MIGRAINOSUS, NOT INTRACTABLE: ICD-10-CM

## 2018-07-24 RX ORDER — SUMATRIPTAN 100 MG/1
TABLET, FILM COATED ORAL
Qty: 9 TABLET | Refills: 1 | Status: SHIPPED | OUTPATIENT
Start: 2018-07-24 | End: 2018-08-24 | Stop reason: SDUPTHER

## 2018-08-24 DIAGNOSIS — G43.009 MIGRAINE WITHOUT AURA AND WITHOUT STATUS MIGRAINOSUS, NOT INTRACTABLE: ICD-10-CM

## 2018-08-24 RX ORDER — SUMATRIPTAN 100 MG/1
TABLET, FILM COATED ORAL
Qty: 9 TABLET | Refills: 1 | Status: SHIPPED | OUTPATIENT
Start: 2018-08-24 | End: 2018-09-20 | Stop reason: SDUPTHER

## 2018-08-24 NOTE — TELEPHONE ENCOUNTER
Next Visit Date:  No future appointments.     Health Maintenance   Topic Date Due    Hepatitis C screen  1960    HIV screen  04/02/1977    DTaP/Tdap/Td vaccine (1 - Tdap) 04/02/1981    Shingles Vaccine (1 of 2 - 2 Dose Series) 04/02/2012    Flu vaccine (1) 09/01/2018    Lipid screen  05/25/2021    Colon cancer screen colonoscopy  06/06/2025       No results found for: LABA1C          ( goal A1C is < 7)   No results found for: LABMICR  LDL Cholesterol (mg/dL)   Date Value   07/23/2013 178 (H)   12/06/2012 216 (H)     LDL Calculated (mg/dL)   Date Value   05/25/2016 115       (goal LDL is <100)   AST (U/L)   Date Value   05/25/2016 19     ALT (U/L)   Date Value   05/25/2016 26     BUN (mg/dL)   Date Value   05/25/2016 17     BP Readings from Last 3 Encounters:   04/20/18 120/70   03/07/18 (!) 110/90   01/26/18 130/78          (goal 120/80)    All Future Testing planned in [facility]  Lab Frequency Next Occurrence               Patient Active Problem List:     Dyslipidemia     Pneumoperitoneum     Diverticulitis of intestine with perforation     Parastomal hernia (HCC)

## 2018-09-12 ENCOUNTER — OFFICE VISIT (OUTPATIENT)
Dept: FAMILY MEDICINE CLINIC | Age: 58
End: 2018-09-12
Payer: COMMERCIAL

## 2018-09-12 VITALS
BODY MASS INDEX: 29.53 KG/M2 | HEART RATE: 71 BPM | DIASTOLIC BLOOD PRESSURE: 72 MMHG | SYSTOLIC BLOOD PRESSURE: 128 MMHG | WEIGHT: 230 LBS

## 2018-09-12 DIAGNOSIS — M54.5 LEFT LOW BACK PAIN, UNSPECIFIED CHRONICITY, WITH SCIATICA PRESENCE UNSPECIFIED: Primary | ICD-10-CM

## 2018-09-12 PROCEDURE — 99213 OFFICE O/P EST LOW 20 MIN: CPT | Performed by: FAMILY MEDICINE

## 2018-09-12 PROCEDURE — 96372 THER/PROPH/DIAG INJ SC/IM: CPT | Performed by: FAMILY MEDICINE

## 2018-09-12 RX ORDER — OXYCODONE HYDROCHLORIDE AND ACETAMINOPHEN 5; 325 MG/1; MG/1
1 TABLET ORAL EVERY 6 HOURS PRN
Qty: 40 TABLET | Refills: 0 | Status: SHIPPED | OUTPATIENT
Start: 2018-09-12 | End: 2018-09-19

## 2018-09-12 RX ORDER — PREDNISONE 20 MG/1
TABLET ORAL
Qty: 20 TABLET | Refills: 0 | Status: SHIPPED | OUTPATIENT
Start: 2018-09-12 | End: 2019-01-31 | Stop reason: ALTCHOICE

## 2018-09-12 RX ORDER — KETOROLAC TROMETHAMINE 30 MG/ML
60 INJECTION, SOLUTION INTRAMUSCULAR; INTRAVENOUS ONCE
Status: COMPLETED | OUTPATIENT
Start: 2018-09-12 | End: 2018-09-12

## 2018-09-12 RX ORDER — KETOROLAC TROMETHAMINE 30 MG/ML
60 INJECTION, SOLUTION INTRAMUSCULAR; INTRAVENOUS ONCE
Qty: 2 ML | Refills: 0
Start: 2018-09-12 | End: 2018-09-12 | Stop reason: CLARIF

## 2018-09-12 RX ADMIN — KETOROLAC TROMETHAMINE 60 MG: 30 INJECTION, SOLUTION INTRAMUSCULAR; INTRAVENOUS at 11:55

## 2018-09-12 ASSESSMENT — ENCOUNTER SYMPTOMS
ABDOMINAL PAIN: 0
CONSTIPATION: 0
DIARRHEA: 0
COUGH: 0
BLOOD IN STOOL: 0
SHORTNESS OF BREATH: 0
WHEEZING: 0
BACK PAIN: 1

## 2018-09-12 NOTE — PROGRESS NOTES
Brennon Denny is a 66 y.o. male who presents today for his medical conditions/complaints as noted below. Brennon Denny is c/o of Back Pain  Routine follow and low back pain, after \"overdoing\" work this past weekend. HPI:     Visit Information    Have you changed or started any medications since your last visit including any over-the-counter medicines, vitamins, or herbal medicines? no   Are you having any side effects from any of your medications? -  no  Have you stopped taking any of your medications? Is so, why? -  no    Have you seen any other physician or provider since your last visit? No  Have you had any other diagnostic tests since your last visit? No  Have you been seen in the emergency room and/or had an admission to a hospital since we last saw you? No  Have you had your routine dental cleaning in the past 6 months? no    Have you activated your [employer] account? If not, what are your barriers?  Yes     Patient Care Team:  Radha Ndiaye MD as PCP - General (Family Medicine)  [facility] Lynr, DO as Surgeon (General Surgery)    Medical History Review  Past Medical, Family, and Social History reviewed and  contribute to the patient presenting condition    Health Maintenance   Topic Date Due    Hepatitis C screen  1960    HIV screen  04/02/1977    DTaP/Tdap/Td vaccine (1 - Tdap) 04/02/1981    Shingles Vaccine (1 of 2 - 2 Dose Series) 04/02/2012    Flu vaccine (1) 12/31/2019 (Originally 9/1/2018)    Lipid screen  05/25/2021    Colon cancer screen colonoscopy  06/06/2025       Past Medical History:   Diagnosis Date    Diverticular disease of colon       Past Surgical History:   Procedure Laterality Date    COLECTOMY  2/1/15        HERNIA REPAIR  1990's    JOINT REPLACEMENT  2009    L TKA    REVISION COLOSTOMY      SIGMOIDECTOMY  2/1/15    [facility]/ Dr. Julio Ferrer     Family History   Problem Relation Age of Onset    Cancer Mother         Breast CA     Social mood.       Objective:   /72   Pulse 71   Wt 230 lb (104.3 kg)   BMI 29.53 kg/m²     Physical Exam   Constitutional: He is oriented to person, place, and time. He appears well-developed and well-nourished. No distress. HENT:   Head: Normocephalic and atraumatic. Mouth/Throat: No oropharyngeal exudate. Eyes: Right eye exhibits no discharge. Left eye exhibits no discharge. No scleral icterus. Neck: Neck supple. Carotid bruit is not present. No thyromegaly present. Cardiovascular: Normal rate, regular rhythm and normal heart sounds. Exam reveals no gallop and no friction rub. No murmur heard. Pulmonary/Chest: Breath sounds normal. No respiratory distress. He has no wheezes. He has no rales. He exhibits no tenderness. Abdominal: There is no tenderness. Musculoskeletal: He exhibits tenderness (lumbar tenderness). He exhibits no edema. Lymphadenopathy:     He has no cervical adenopathy. Neurological: He is alert and oriented to person, place, and time. No cranial nerve deficit. Coordination normal.   Skin: No rash noted. He is not diaphoretic. Psychiatric: He has a normal mood and affect. His behavior is normal. Judgment and thought content normal.       Assessment:       Diagnosis Orders   1. Left low back pain, unspecified chronicity, with sciatica presence unspecified  oxyCODONE-acetaminophen (PERCOCET) 5-325 MG per tablet    predniSONE (DELTASONE) 20 MG tablet    ketorolac (TORADOL) injection 60 mg    DISCONTINUED: ketorolac (TORADOL) 60 MG/2ML injection         Plan:      No Follow-up on file. No orders of the defined types were placed in this encounter. Orders Placed This Encounter   Medications    oxyCODONE-acetaminophen (PERCOCET) 5-325 MG per tablet     Sig: Take 1 tablet by mouth every 6 hours as needed for Pain for up to 7 days. Intended supply: 7 days. Take lowest dose possible to manage pain.      Dispense:  40 tablet     Refill:  0    predniSONE (DELTASONE) 20 MG tablet     Sig: Take 3 tabs daily for 3 days, then 2 a day for 3 days, then 1 a day for 3 days, then 1/2 a day for 3 days.      Dispense:  20 tablet     Refill:  0    DISCONTD: ketorolac (TORADOL) 60 MG/2ML injection     Sig: Inject 2 mLs into the muscle once for 1 dose     Dispense:  2 mL     Refill:  0    ketorolac (TORADOL) injection 60 mg

## 2018-09-20 DIAGNOSIS — G43.009 MIGRAINE WITHOUT AURA AND WITHOUT STATUS MIGRAINOSUS, NOT INTRACTABLE: ICD-10-CM

## 2018-09-21 RX ORDER — SUMATRIPTAN 100 MG/1
TABLET, FILM COATED ORAL
Qty: 9 TABLET | Refills: 1 | Status: SHIPPED | OUTPATIENT
Start: 2018-09-21 | End: 2018-10-14 | Stop reason: SDUPTHER

## 2018-09-25 RX ORDER — ATORVASTATIN CALCIUM 20 MG/1
TABLET, FILM COATED ORAL
Qty: 90 TABLET | Refills: 3 | Status: SHIPPED | OUTPATIENT
Start: 2018-09-25 | End: 2020-11-12 | Stop reason: SDUPTHER

## 2018-10-14 DIAGNOSIS — G43.009 MIGRAINE WITHOUT AURA AND WITHOUT STATUS MIGRAINOSUS, NOT INTRACTABLE: ICD-10-CM

## 2018-10-14 RX ORDER — SUMATRIPTAN 100 MG/1
TABLET, FILM COATED ORAL
Qty: 9 TABLET | Refills: 1 | Status: SHIPPED | OUTPATIENT
Start: 2018-10-14 | End: 2018-11-04 | Stop reason: SDUPTHER

## 2018-10-28 DIAGNOSIS — R51.9 CHRONIC DAILY HEADACHE: ICD-10-CM

## 2018-10-29 RX ORDER — TOPIRAMATE 50 MG/1
TABLET, FILM COATED ORAL
Qty: 90 TABLET | Refills: 3 | Status: SHIPPED | OUTPATIENT
Start: 2018-10-29 | End: 2019-01-31 | Stop reason: DRUGHIGH

## 2018-10-29 NOTE — TELEPHONE ENCOUNTER
Next Visit Date:  No future appointments.     Health Maintenance   Topic Date Due    Hepatitis C screen  1960    HIV screen  04/02/1977    DTaP/Tdap/Td vaccine (1 - Tdap) 04/02/1981    Shingles Vaccine (1 of 2 - 2 Dose Series) 04/02/2012    Flu vaccine (1) 12/31/2019 (Originally 9/1/2018)    Lipid screen  05/25/2021    Colon cancer screen colonoscopy  06/06/2025       No results found for: LABA1C          ( goal A1C is < 7)   No results found for: LABMICR  LDL Cholesterol (mg/dL)   Date Value   07/23/2013 178 (H)   12/06/2012 216 (H)     LDL Calculated (mg/dL)   Date Value   05/25/2016 115       (goal LDL is <100)   AST (U/L)   Date Value   05/25/2016 19     ALT (U/L)   Date Value   05/25/2016 26     BUN (mg/dL)   Date Value   05/25/2016 17     BP Readings from Last 3 Encounters:   09/12/18 128/72   04/20/18 120/70   03/07/18 (!) 110/90          (goal 120/80)    All Future Testing planned in [facility]  Lab Frequency Next Occurrence               Patient Active Problem List:     Dyslipidemia     Pneumoperitoneum     Diverticulitis of intestine with perforation     Parastomal hernia (HCC)

## 2018-11-04 DIAGNOSIS — G43.009 MIGRAINE WITHOUT AURA AND WITHOUT STATUS MIGRAINOSUS, NOT INTRACTABLE: ICD-10-CM

## 2018-11-05 RX ORDER — SUMATRIPTAN 100 MG/1
TABLET, FILM COATED ORAL
Qty: 9 TABLET | Refills: 1 | Status: SHIPPED | OUTPATIENT
Start: 2018-11-05 | End: 2018-11-28 | Stop reason: SDUPTHER

## 2018-11-28 DIAGNOSIS — G43.009 MIGRAINE WITHOUT AURA AND WITHOUT STATUS MIGRAINOSUS, NOT INTRACTABLE: ICD-10-CM

## 2018-11-28 RX ORDER — SUMATRIPTAN 100 MG/1
TABLET, FILM COATED ORAL
Qty: 9 TABLET | Refills: 1 | Status: SHIPPED | OUTPATIENT
Start: 2018-11-28 | End: 2018-12-20 | Stop reason: SDUPTHER

## 2018-12-20 DIAGNOSIS — G43.009 MIGRAINE WITHOUT AURA AND WITHOUT STATUS MIGRAINOSUS, NOT INTRACTABLE: ICD-10-CM

## 2018-12-20 RX ORDER — SUMATRIPTAN 100 MG/1
TABLET, FILM COATED ORAL
Qty: 9 TABLET | Refills: 1 | Status: SHIPPED | OUTPATIENT
Start: 2018-12-20 | End: 2019-01-07 | Stop reason: SDUPTHER

## 2019-01-07 DIAGNOSIS — G43.009 MIGRAINE WITHOUT AURA AND WITHOUT STATUS MIGRAINOSUS, NOT INTRACTABLE: ICD-10-CM

## 2019-01-07 RX ORDER — SUMATRIPTAN 100 MG/1
TABLET, FILM COATED ORAL
Qty: 9 TABLET | Refills: 2 | Status: SHIPPED | OUTPATIENT
Start: 2019-01-07 | End: 2019-01-31 | Stop reason: SDUPTHER

## 2019-01-31 ENCOUNTER — OFFICE VISIT (OUTPATIENT)
Dept: FAMILY MEDICINE CLINIC | Age: 59
End: 2019-01-31
Payer: COMMERCIAL

## 2019-01-31 VITALS
DIASTOLIC BLOOD PRESSURE: 84 MMHG | BODY MASS INDEX: 29.92 KG/M2 | HEART RATE: 80 BPM | WEIGHT: 233 LBS | SYSTOLIC BLOOD PRESSURE: 140 MMHG

## 2019-01-31 DIAGNOSIS — G43.009 MIGRAINE WITHOUT AURA AND WITHOUT STATUS MIGRAINOSUS, NOT INTRACTABLE: ICD-10-CM

## 2019-01-31 DIAGNOSIS — R51.9 CHRONIC DAILY HEADACHE: ICD-10-CM

## 2019-01-31 DIAGNOSIS — E78.5 DYSLIPIDEMIA: Primary | ICD-10-CM

## 2019-01-31 PROCEDURE — 99213 OFFICE O/P EST LOW 20 MIN: CPT | Performed by: FAMILY MEDICINE

## 2019-01-31 RX ORDER — TOPIRAMATE 50 MG/1
TABLET, FILM COATED ORAL
Qty: 90 TABLET | Refills: 3 | Status: CANCELLED | OUTPATIENT
Start: 2019-01-31

## 2019-01-31 RX ORDER — SUMATRIPTAN 100 MG/1
TABLET, FILM COATED ORAL
Qty: 9 TABLET | Refills: 5 | Status: SHIPPED | OUTPATIENT
Start: 2019-01-31 | End: 2019-03-17 | Stop reason: SDUPTHER

## 2019-01-31 ASSESSMENT — PATIENT HEALTH QUESTIONNAIRE - PHQ9
2. FEELING DOWN, DEPRESSED OR HOPELESS: 0
SUM OF ALL RESPONSES TO PHQ QUESTIONS 1-9: 0
1. LITTLE INTEREST OR PLEASURE IN DOING THINGS: 0
SUM OF ALL RESPONSES TO PHQ9 QUESTIONS 1 & 2: 0
SUM OF ALL RESPONSES TO PHQ QUESTIONS 1-9: 0

## 2019-01-31 ASSESSMENT — ENCOUNTER SYMPTOMS
WHEEZING: 0
BACK PAIN: 0
DIARRHEA: 0
BLOOD IN STOOL: 0
SHORTNESS OF BREATH: 0
COUGH: 0
CONSTIPATION: 0
ABDOMINAL PAIN: 0

## 2019-03-17 DIAGNOSIS — G43.009 MIGRAINE WITHOUT AURA AND WITHOUT STATUS MIGRAINOSUS, NOT INTRACTABLE: ICD-10-CM

## 2019-03-18 RX ORDER — SUMATRIPTAN 100 MG/1
TABLET, FILM COATED ORAL
Qty: 9 TABLET | Refills: 5 | Status: SHIPPED | OUTPATIENT
Start: 2019-03-18 | End: 2019-05-16 | Stop reason: SDUPTHER

## 2019-04-08 DIAGNOSIS — G43.009 MIGRAINE WITHOUT AURA AND WITHOUT STATUS MIGRAINOSUS, NOT INTRACTABLE: ICD-10-CM

## 2019-04-08 DIAGNOSIS — R51.9 CHRONIC DAILY HEADACHE: ICD-10-CM

## 2019-04-08 NOTE — TELEPHONE ENCOUNTER
Last visit: 1/31/19      Next Visit Date:  No future appointments.     Health Maintenance   Topic Date Due    Hepatitis C screen  1960    HIV screen  04/02/1977    DTaP/Tdap/Td vaccine (1 - Tdap) 04/02/1981    Shingles Vaccine (1 of 2) 04/02/2012    Flu vaccine (Season Ended) 12/31/2019 (Originally 9/1/2019)    Lipid screen  05/25/2021    Colon cancer screen colonoscopy  06/06/2025       No results found for: LABA1C          ( goal A1C is < 7)   No results found for: LABMICR  LDL Cholesterol (mg/dL)   Date Value   07/23/2013 178 (H)   12/06/2012 216 (H)     LDL Calculated (mg/dL)   Date Value   05/25/2016 115       (goal LDL is <100)   AST (U/L)   Date Value   05/25/2016 19     ALT (U/L)   Date Value   05/25/2016 26     BUN (mg/dL)   Date Value   05/25/2016 17     BP Readings from Last 3 Encounters:   01/31/19 (!) 140/84   09/12/18 128/72   04/20/18 120/70          (goal 120/80)    All Future Testing planned in [facility]  Lab Frequency Next Occurrence               Patient Active Problem List:     Dyslipidemia     Pneumoperitoneum     Diverticulitis of intestine with perforation     Parastomal hernia (HCC)

## 2019-04-08 NOTE — TELEPHONE ENCOUNTER
Last appointment was on 01/31/19- is having script go though [employer] mail service, states it must be a 90 day refill

## 2019-05-16 ENCOUNTER — OFFICE VISIT (OUTPATIENT)
Dept: FAMILY MEDICINE CLINIC | Age: 59
End: 2019-05-16
Payer: COMMERCIAL

## 2019-05-16 VITALS
HEART RATE: 78 BPM | BODY MASS INDEX: 30.54 KG/M2 | SYSTOLIC BLOOD PRESSURE: 138 MMHG | WEIGHT: 238 LBS | HEIGHT: 74 IN | DIASTOLIC BLOOD PRESSURE: 80 MMHG | OXYGEN SATURATION: 98 %

## 2019-05-16 DIAGNOSIS — M54.50 LEFT-SIDED LOW BACK PAIN WITHOUT SCIATICA, UNSPECIFIED CHRONICITY: Primary | ICD-10-CM

## 2019-05-16 DIAGNOSIS — G43.009 MIGRAINE WITHOUT AURA AND WITHOUT STATUS MIGRAINOSUS, NOT INTRACTABLE: ICD-10-CM

## 2019-05-16 PROCEDURE — 99213 OFFICE O/P EST LOW 20 MIN: CPT | Performed by: FAMILY MEDICINE

## 2019-05-16 RX ORDER — KETOROLAC TROMETHAMINE 30 MG/ML
60 INJECTION, SOLUTION INTRAMUSCULAR; INTRAVENOUS ONCE
Status: DISCONTINUED | OUTPATIENT
Start: 2019-05-16 | End: 2019-05-16

## 2019-05-16 RX ORDER — OXYCODONE HYDROCHLORIDE AND ACETAMINOPHEN 5; 325 MG/1; MG/1
1 TABLET ORAL EVERY 6 HOURS PRN
Qty: 28 TABLET | Refills: 0 | Status: SHIPPED | OUTPATIENT
Start: 2019-05-16 | End: 2019-05-23

## 2019-05-16 RX ORDER — PREDNISONE 20 MG/1
TABLET ORAL
Qty: 20 TABLET | Refills: 0 | Status: SHIPPED | OUTPATIENT
Start: 2019-05-16 | End: 2019-06-02 | Stop reason: SDUPTHER

## 2019-05-16 ASSESSMENT — ENCOUNTER SYMPTOMS
WHEEZING: 0
SHORTNESS OF BREATH: 0
COUGH: 0
DIARRHEA: 0
BLOOD IN STOOL: 0
BACK PAIN: 1
CONSTIPATION: 0
ABDOMINAL PAIN: 0

## 2019-05-16 NOTE — PROGRESS NOTES
Ximena Conrad is a 64 y.o. male who presents todayfor his medical conditions/complaints as noted below. Ximena Conrad is here today c/oLower Back Pain (x2 days )    Flare up of back pain. More pain down his left leg. Followed excessive work this past weekend.  :     Visit Information    Have you changed or started any medications since your last visit including any over-the-counter medicines, vitamins, or herbal medicines? no   Are you having any side effects from any of your medications? -  no  Have you stopped taking any of your medications? Is so, why? -  no    Have you seen any other physician or provider since your last visit? No  Have you had any other diagnostic tests since your last visit? No  Have you been seen in the emergency room and/or had an admission to a hospital since we last saw you? No  Have you had your routine dental cleaning in the past 6 months? no    Have you activated your [employer] account? If not, what are your barriers?  Yes     Patient Care Team:  Baudilio Barlow MD as PCP - General (Family Medicine)  Devere Friendly, DO as Surgeon (General Surgery)    Medical History Review  Past Medical, Family, and Social History reviewed and does not contribute to the patient presenting condition    Health Maintenance   Topic Date Due    Hepatitis C screen  1960    HIV screen  04/02/1977    DTaP/Tdap/Td vaccine (1 - Tdap) 04/02/1981    Shingles Vaccine (1 of 2) 04/02/2012    Flu vaccine (Season Ended) 12/31/2019 (Originally 9/1/2019)    Lipid screen  05/25/2021    Colon cancer screen colonoscopy  06/06/2025    Pneumococcal 0-64 years Vaccine  Aged Out           HPI        Past Medical History:   Diagnosis Date    Diverticular disease of colon       Past Surgical History:   Procedure Laterality Date    COLECTOMY  2/1/15        HERNIA REPAIR  1990's    JOINT REPLACEMENT  2009    L TKA    REVISION COLOSTOMY      SIGMOIDECTOMY  2/1/15    [facility]/ Dr. Zelalem Liu mood and sleep disturbance.       :   /80   Pulse 78   Ht 6' 2\" (1.88 m)   Wt 238 lb (108 kg)   SpO2 98%   BMI 30.56 kg/m²     Physical Exam   Constitutional: He is oriented to person, place, and time. He appears well-developed and well-nourished. No distress. HENT:   Head: Normocephalic and atraumatic. Mouth/Throat: No oropharyngeal exudate. Eyes: Right eye exhibits no discharge. Left eye exhibits no discharge. No scleral icterus. Neck: Neck supple. Carotid bruit is not present. No thyromegaly present. Cardiovascular: Normal rate, regular rhythm and normal heart sounds. Exam reveals no gallop and no friction rub. No murmur heard. Pulmonary/Chest: Breath sounds normal. No respiratory distress. He has no wheezes. He has no rales. He exhibits no tenderness. Abdominal: There is no tenderness. Musculoskeletal: He exhibits tenderness. He exhibits no edema. Lymphadenopathy:     He has no cervical adenopathy. Neurological: He is alert and oriented to person, place, and time. No cranial nerve deficit. Coordination normal.   Skin: No rash noted. He is not diaphoretic. Psychiatric: He has a normal mood and affect. His behavior is normal. Judgment and thought content normal.       Assessment:       Diagnosis Orders   1. Left-sided low back pain without sciatica, unspecified chronicity  oxyCODONE-acetaminophen (PERCOCET) 5-325 MG per tablet         Plan:      No follow-ups on file. No orders of the defined types were placed in this encounter. Orders Placed This Encounter   Medications    predniSONE (DELTASONE) 20 MG tablet     Sig: Take 3 tabs daily for 3 days, then 2 a day for 3 days, then 1 a day for 3 days, then 1/2 a day for 3 days. Dispense:  20 tablet     Refill:  0    oxyCODONE-acetaminophen (PERCOCET) 5-325 MG per tablet     Sig: Take 1 tablet by mouth every 6 hours as needed for Pain for up to 7 days. Intended supply: 7 days.  Take lowest dose possible to manage pain Dispense:  28 tablet     Refill:  0     Reduce doses taken as pain becomes manageable    DISCONTD: ketorolac (TORADOL) injection 60 mg     We are out of toradol but will have some tomorrow and he will call us prn in the am.

## 2019-05-17 RX ORDER — SUMATRIPTAN 100 MG/1
TABLET, FILM COATED ORAL
Qty: 9 TABLET | Refills: 5 | Status: SHIPPED | OUTPATIENT
Start: 2019-05-17 | End: 2019-08-02 | Stop reason: SDUPTHER

## 2019-05-21 ENCOUNTER — TELEPHONE (OUTPATIENT)
Dept: FAMILY MEDICINE CLINIC | Age: 59
End: 2019-05-21

## 2019-05-23 NOTE — TELEPHONE ENCOUNTER
Called patient and left message to call office.  Sending a letter to contact the office if he still wants to get it done

## 2019-06-03 RX ORDER — PREDNISONE 20 MG/1
TABLET ORAL
Qty: 20 TABLET | Refills: 0 | Status: SHIPPED | OUTPATIENT
Start: 2019-06-03 | End: 2019-06-23 | Stop reason: SDUPTHER

## 2019-06-03 NOTE — TELEPHONE ENCOUNTER
Last visit: 5/16/19  Last Med refill: 5/16/19    Next Visit Date:  No future appointments.     Health Maintenance   Topic Date Due    Hepatitis C screen  1960    HIV screen  04/02/1977    DTaP/Tdap/Td vaccine (1 - Tdap) 04/02/1981    Diabetes screen  04/02/2002    Shingles Vaccine (1 of 2) 04/02/2012    Flu vaccine (Season Ended) 12/31/2019 (Originally 9/1/2019)    Lipid screen  05/25/2021    Colon cancer screen colonoscopy  06/06/2025    Pneumococcal 0-64 years Vaccine  Aged Out       No results found for: LABA1C          ( goal A1C is < 7)   No results found for: LABMICR  LDL Cholesterol (mg/dL)   Date Value   07/23/2013 178 (H)   12/06/2012 216 (H)     LDL Calculated (mg/dL)   Date Value   05/25/2016 115       (goal LDL is <100)   AST (U/L)   Date Value   05/25/2016 19     ALT (U/L)   Date Value   05/25/2016 26     BUN (mg/dL)   Date Value   05/25/2016 17     BP Readings from Last 3 Encounters:   05/16/19 138/80   01/31/19 (!) 140/84   09/12/18 128/72          (goal 120/80)    All Future Testing planned in [facility]  Lab Frequency Next Occurrence               Patient Active Problem List:     Dyslipidemia     Pneumoperitoneum     Diverticulitis of intestine with perforation     Parastomal hernia (HCC)

## 2019-06-17 NOTE — TELEPHONE ENCOUNTER
Patient called requesting refill of medication stating it really helped with his miranges.  Patient has Follow Up

## 2019-06-23 RX ORDER — PREDNISONE 20 MG/1
TABLET ORAL
Qty: 20 TABLET | Refills: 0 | Status: SHIPPED | OUTPATIENT
Start: 2019-06-23 | End: 2019-09-05 | Stop reason: ALTCHOICE

## 2019-06-25 ENCOUNTER — OFFICE VISIT (OUTPATIENT)
Dept: FAMILY MEDICINE CLINIC | Age: 59
End: 2019-06-25
Payer: COMMERCIAL

## 2019-06-25 VITALS
OXYGEN SATURATION: 98 % | HEART RATE: 81 BPM | DIASTOLIC BLOOD PRESSURE: 70 MMHG | BODY MASS INDEX: 30.58 KG/M2 | WEIGHT: 238.2 LBS | SYSTOLIC BLOOD PRESSURE: 124 MMHG

## 2019-06-25 DIAGNOSIS — G43.709 CHRONIC MIGRAINE WITHOUT AURA WITHOUT STATUS MIGRAINOSUS, NOT INTRACTABLE: Primary | ICD-10-CM

## 2019-06-25 PROCEDURE — 99213 OFFICE O/P EST LOW 20 MIN: CPT | Performed by: FAMILY MEDICINE

## 2019-06-25 ASSESSMENT — ENCOUNTER SYMPTOMS
DIARRHEA: 0
SHORTNESS OF BREATH: 0
ABDOMINAL PAIN: 0
COUGH: 0
WHEEZING: 0
BLOOD IN STOOL: 0
BACK PAIN: 0
CONSTIPATION: 0

## 2019-06-25 NOTE — PROGRESS NOTES
Tiffanie Magaña is a 64 y.o. male who presents todayfor his medical conditions/complaints as noted below. Tiffanie Magaña is here today c/oMedication Check (migraine)  Marco A Navarro presents with improved headache spell after course of prednisone therapy. He was getting daily headaches while on the topamax therapy.    :     Visit Information    Have you changed or started any medications since your last visit including any over-the-counter medicines, vitamins, or herbal medicines? no   Are you having any side effects from any of your medications? -  no  Have you stopped taking any of your medications? Is so, why? -  no    Have you seen any other physician or provider since your last visit? No  Have you had any other diagnostic tests since your last visit? No  Have you been seen in the emergency room and/or had an admission to a hospital since we last saw you? No  Have you had your routine dental cleaning in the past 6 months? no    Have you activated your [employer]yt[employer] Anywhere account? If not, what are your barriers?  Yes     Patient Care Team:  Krystal Alba MD as PCP - General (Family Medicine)  Krystal Alba MD as PCP - [facility] Empaneled Provider  Therese Coleman DO as Surgeon (General Surgery)    Medical History Review  Past Medical, Family, and Social History reviewed and does not contribute to the patient presenting condition    Health Maintenance   Topic Date Due    Hepatitis C screen  1960    HIV screen  04/02/1977    DTaP/Tdap/Td vaccine (1 - Tdap) 04/02/1981    Diabetes screen  04/02/2002    Shingles Vaccine (1 of 2) 04/02/2012    Flu vaccine (Season Ended) 12/31/2019 (Originally 9/1/2019)    Lipid screen  05/25/2021    Colon cancer screen colonoscopy  06/06/2025    Pneumococcal 0-64 years Vaccine  Aged Out           HPI        Past Medical History:   Diagnosis Date    Diverticular disease of colon       Past Surgical History:   Procedure Laterality Date    COLECTOMY  2/1/15    Irma Petit HERNIA REPAIR  1990's    JOINT REPLACEMENT  2009    L [facility]    REVISION COLOSTOMY      SIGMOIDECTOMY  2/1/15    [facility]/ Dr. Jasson Terrazas     Family History   Problem Relation Age of Onset    Cancer Mother         Breast CA     Social History     Tobacco Use    Smoking status: Former Smoker     Packs/day: 1.00     Years: 23.00     Pack years: 23.00     Types: Cigarettes     Last attempt to quit: 10/1/2008     Years since quitting: 10.7    Smokeless tobacco: Never Used   Substance Use Topics    Alcohol use: Yes     Comment: few days a week      Current Outpatient Medications   Medication Sig Dispense Refill    Erenumab-aooe (AIMOVIG 140 DOSE) 70 MG/ML SOAJ 70 mg SQ once monthly 1 pen 3    SUMAtriptan (IMITREX) 100 MG tablet take 1 tablet by mouth once daily if needed for migraines 9 tablet 5    topiramate ER (TROKENDI XR) 100 MG CP24 Take 100 mg by mouth nightly 90 capsule 3    atorvastatin (LIPITOR) 20 MG tablet TAKE 1 TABLET DAILY 90 tablet 3    diclofenac (VOLTAREN) 75 MG EC tablet Take 1 tablet by mouth 2 times daily (with meals) 60 tablet 11    predniSONE (DELTASONE) 20 MG tablet take 3 tablets by mouth daily for 3 days then take 2 tablets daily for 3 days then take 1 tablet daily for 3 days then take 1/2 tablet daily 20 tablet 0     No current facility-administered medications for this visit. No Known Allergies      Subjective:   Review of Systems   Constitutional: Negative for chills, diaphoresis, fatigue and fever. HENT: Negative for congestion and hearing loss. Eyes: Negative for visual disturbance. Respiratory: Negative for cough, shortness of breath and wheezing. Cardiovascular: Negative for chest pain, palpitations and leg swelling. Gastrointestinal: Negative for abdominal pain, blood in stool, constipation and diarrhea. Genitourinary: Negative for dysuria. Musculoskeletal: Negative for arthralgias, back pain, gait problem and neck pain. Skin: Negative for rash. Neurological: Positive for headaches. Negative for weakness and numbness. Psychiatric/Behavioral: Negative for dysphoric mood and sleep disturbance.       :   /70   Pulse 81   Wt 238 lb 3.2 oz (108 kg)   SpO2 98%   BMI 30.58 kg/m²     Physical Exam   Constitutional: He is oriented to person, place, and time. He appears well-developed and well-nourished. No distress. HENT:   Head: Normocephalic and atraumatic. Mouth/Throat: No oropharyngeal exudate. Eyes: Right eye exhibits no discharge. Left eye exhibits no discharge. No scleral icterus. Neck: Neck supple. Carotid bruit is not present. No thyromegaly present. Cardiovascular: Normal rate, regular rhythm and normal heart sounds. Exam reveals no gallop and no friction rub. No murmur heard. Pulmonary/Chest: Breath sounds normal. No respiratory distress. He has no wheezes. He has no rales. He exhibits no tenderness. Abdominal: There is no tenderness. Musculoskeletal: He exhibits no edema or tenderness. Lymphadenopathy:     He has no cervical adenopathy. Neurological: He is alert and oriented to person, place, and time. No cranial nerve deficit. Coordination normal.   Skin: No rash noted. He is not diaphoretic. Psychiatric: He has a normal mood and affect. His behavior is normal. Judgment and thought content normal.       Assessment:       Diagnosis Orders   1. Chronic migraine without aura without status migrainosus, not intractable           Plan:      No follow-ups on file. No orders of the defined types were placed in this encounter. Orders Placed This Encounter   Medications    Erenumab-aooe (AIMOVIG 140 DOSE) 70 MG/ML SOAJ     Si mg SQ once monthly     Dispense:  1 pen     Refill:  3     Will try to initiate aimovig therapy. Good Rx along with copay cards provided him. He is to let me know if able or not able to get Rx filled.

## 2019-08-02 DIAGNOSIS — G43.009 MIGRAINE WITHOUT AURA AND WITHOUT STATUS MIGRAINOSUS, NOT INTRACTABLE: ICD-10-CM

## 2019-08-05 RX ORDER — SUMATRIPTAN 100 MG/1
TABLET, FILM COATED ORAL
Qty: 9 TABLET | Refills: 5 | Status: SHIPPED | OUTPATIENT
Start: 2019-08-05 | End: 2019-10-04 | Stop reason: SDUPTHER

## 2019-09-05 ENCOUNTER — OFFICE VISIT (OUTPATIENT)
Dept: FAMILY MEDICINE CLINIC | Age: 59
End: 2019-09-05
Payer: COMMERCIAL

## 2019-09-05 VITALS
HEART RATE: 82 BPM | BODY MASS INDEX: 30.07 KG/M2 | WEIGHT: 234.2 LBS | OXYGEN SATURATION: 98 % | DIASTOLIC BLOOD PRESSURE: 72 MMHG | SYSTOLIC BLOOD PRESSURE: 120 MMHG

## 2019-09-05 DIAGNOSIS — M54.42 ACUTE LEFT-SIDED LOW BACK PAIN WITH LEFT-SIDED SCIATICA: Primary | ICD-10-CM

## 2019-09-05 PROCEDURE — 96372 THER/PROPH/DIAG INJ SC/IM: CPT | Performed by: FAMILY MEDICINE

## 2019-09-05 PROCEDURE — 99213 OFFICE O/P EST LOW 20 MIN: CPT | Performed by: FAMILY MEDICINE

## 2019-09-05 RX ORDER — PREDNISONE 20 MG/1
TABLET ORAL
Qty: 20 TABLET | Refills: 0 | Status: SHIPPED | OUTPATIENT
Start: 2019-09-05 | End: 2020-01-31 | Stop reason: SDUPTHER

## 2019-09-05 RX ORDER — OXYCODONE HYDROCHLORIDE AND ACETAMINOPHEN 5; 325 MG/1; MG/1
1 TABLET ORAL EVERY 4 HOURS PRN
Qty: 50 TABLET | Refills: 0 | Status: SHIPPED | OUTPATIENT
Start: 2019-09-05 | End: 2019-09-19

## 2019-09-05 RX ORDER — KETOROLAC TROMETHAMINE 30 MG/ML
60 INJECTION, SOLUTION INTRAMUSCULAR; INTRAVENOUS ONCE
Status: COMPLETED | OUTPATIENT
Start: 2019-09-05 | End: 2019-09-05

## 2019-09-05 RX ADMIN — KETOROLAC TROMETHAMINE 60 MG: 30 INJECTION, SOLUTION INTRAMUSCULAR; INTRAVENOUS at 13:14

## 2019-09-05 ASSESSMENT — ENCOUNTER SYMPTOMS
BACK PAIN: 1
COUGH: 0
DIARRHEA: 0
BLOOD IN STOOL: 0
SHORTNESS OF BREATH: 0
WHEEZING: 0
CONSTIPATION: 0
ABDOMINAL PAIN: 0

## 2019-09-05 NOTE — PROGRESS NOTES
oriented to person, place, and time. No cranial nerve deficit. Coordination normal.   Skin: No rash noted. He is not diaphoretic. Psychiatric: He has a normal mood and affect. His behavior is normal. Judgment and thought content normal.       Assessment:       Diagnosis Orders   1. Acute left-sided low back pain with left-sided sciatica  oxyCODONE-acetaminophen (PERCOCET) 5-325 MG per tablet         Plan:      Return if symptoms worsen or fail to improve. No orders of the defined types were placed in this encounter. Orders Placed This Encounter   Medications    predniSONE (DELTASONE) 20 MG tablet     Sig: Take 3 tabs daily for 3 days, then 2 a day for 3 days, then 1 a day for 3 days, then 1/2 a day for 3 days. Dispense:  20 tablet     Refill:  0    oxyCODONE-acetaminophen (PERCOCET) 5-325 MG per tablet     Sig: Take 1 tablet by mouth every 4 hours as needed for Pain for up to 14 days. Dispense:  50 tablet     Refill:  0     Reduce doses taken as pain becomes manageable    ketorolac (TORADOL) injection 60 mg     Per above he will let us know prn sx persist through the weekend and will consider further tx options.

## 2019-10-04 DIAGNOSIS — G43.009 MIGRAINE WITHOUT AURA AND WITHOUT STATUS MIGRAINOSUS, NOT INTRACTABLE: ICD-10-CM

## 2019-10-04 RX ORDER — SUMATRIPTAN 100 MG/1
TABLET, FILM COATED ORAL
Qty: 9 TABLET | Refills: 5 | Status: SHIPPED | OUTPATIENT
Start: 2019-10-04 | End: 2019-11-29 | Stop reason: SDUPTHER

## 2019-10-14 RX ORDER — ATORVASTATIN CALCIUM 20 MG/1
TABLET, FILM COATED ORAL
Qty: 90 TABLET | Refills: 3 | Status: SHIPPED | OUTPATIENT
Start: 2019-10-14 | End: 2021-02-15

## 2019-11-29 DIAGNOSIS — G43.009 MIGRAINE WITHOUT AURA AND WITHOUT STATUS MIGRAINOSUS, NOT INTRACTABLE: ICD-10-CM

## 2019-11-29 RX ORDER — SUMATRIPTAN 100 MG/1
TABLET, FILM COATED ORAL
Qty: 9 TABLET | Refills: 5 | Status: SHIPPED | OUTPATIENT
Start: 2019-11-29 | End: 2020-01-23

## 2020-01-23 RX ORDER — SUMATRIPTAN 100 MG/1
TABLET, FILM COATED ORAL
Qty: 9 TABLET | Refills: 5 | Status: SHIPPED | OUTPATIENT
Start: 2020-01-23 | End: 2020-03-24

## 2020-01-31 ENCOUNTER — TELEPHONE (OUTPATIENT)
Dept: FAMILY MEDICINE CLINIC | Age: 60
End: 2020-01-31

## 2020-01-31 ENCOUNTER — HOSPITAL ENCOUNTER (OUTPATIENT)
Age: 60
Discharge: HOME | End: 2020-02-02
Payer: COMMERCIAL

## 2020-01-31 ENCOUNTER — OFFICE VISIT (OUTPATIENT)
Dept: FAMILY MEDICINE CLINIC | Age: 60
End: 2020-01-31
Payer: COMMERCIAL

## 2020-01-31 ENCOUNTER — HOSPITAL ENCOUNTER (OUTPATIENT)
Dept: GENERAL RADIOLOGY | Age: 60
Discharge: HOME | End: 2020-02-02
Payer: COMMERCIAL

## 2020-01-31 VITALS
OXYGEN SATURATION: 99 % | TEMPERATURE: 97.7 F | HEART RATE: 67 BPM | SYSTOLIC BLOOD PRESSURE: 138 MMHG | DIASTOLIC BLOOD PRESSURE: 84 MMHG | BODY MASS INDEX: 30.69 KG/M2 | WEIGHT: 239 LBS

## 2020-01-31 PROCEDURE — 73080 X-RAY EXAM OF ELBOW: CPT

## 2020-01-31 PROCEDURE — 99213 OFFICE O/P EST LOW 20 MIN: CPT | Performed by: NURSE PRACTITIONER

## 2020-01-31 PROCEDURE — 96372 THER/PROPH/DIAG INJ SC/IM: CPT | Performed by: NURSE PRACTITIONER

## 2020-01-31 RX ORDER — KETOROLAC TROMETHAMINE 30 MG/ML
60 INJECTION, SOLUTION INTRAMUSCULAR; INTRAVENOUS ONCE
Status: COMPLETED | OUTPATIENT
Start: 2020-01-31 | End: 2020-01-31

## 2020-01-31 RX ORDER — PREDNISONE 20 MG/1
TABLET ORAL
Qty: 20 TABLET | Refills: 0 | Status: SHIPPED | OUTPATIENT
Start: 2020-01-31 | End: 2020-07-01 | Stop reason: SDUPTHER

## 2020-01-31 RX ORDER — OXYCODONE HYDROCHLORIDE AND ACETAMINOPHEN 5; 325 MG/1; MG/1
1 TABLET ORAL EVERY 6 HOURS PRN
Qty: 28 TABLET | Refills: 0 | Status: SHIPPED | OUTPATIENT
Start: 2020-01-31 | End: 2020-07-01 | Stop reason: SDUPTHER

## 2020-01-31 RX ADMIN — KETOROLAC TROMETHAMINE 60 MG: 30 INJECTION, SOLUTION INTRAMUSCULAR; INTRAVENOUS at 13:28

## 2020-01-31 SDOH — ECONOMIC STABILITY: INCOME INSECURITY: HOW HARD IS IT FOR YOU TO PAY FOR THE VERY BASICS LIKE FOOD, HOUSING, MEDICAL CARE, AND HEATING?: PATIENT DECLINED

## 2020-01-31 SDOH — ECONOMIC STABILITY: FOOD INSECURITY: WITHIN THE PAST 12 MONTHS, YOU WORRIED THAT YOUR FOOD WOULD RUN OUT BEFORE YOU GOT MONEY TO BUY MORE.: PATIENT DECLINED

## 2020-01-31 SDOH — ECONOMIC STABILITY: TRANSPORTATION INSECURITY
IN THE PAST 12 MONTHS, HAS THE LACK OF TRANSPORTATION KEPT YOU FROM MEDICAL APPOINTMENTS OR FROM GETTING MEDICATIONS?: PATIENT DECLINED

## 2020-01-31 SDOH — ECONOMIC STABILITY: FOOD INSECURITY: WITHIN THE PAST 12 MONTHS, THE FOOD YOU BOUGHT JUST DIDN'T LAST AND YOU DIDN'T HAVE MONEY TO GET MORE.: PATIENT DECLINED

## 2020-01-31 SDOH — ECONOMIC STABILITY: TRANSPORTATION INSECURITY
IN THE PAST 12 MONTHS, HAS LACK OF TRANSPORTATION KEPT YOU FROM MEETINGS, WORK, OR FROM GETTING THINGS NEEDED FOR DAILY LIVING?: PATIENT DECLINED

## 2020-01-31 ASSESSMENT — PATIENT HEALTH QUESTIONNAIRE - PHQ9
SUM OF ALL RESPONSES TO PHQ QUESTIONS 1-9: 0
SUM OF ALL RESPONSES TO PHQ QUESTIONS 1-9: 0
2. FEELING DOWN, DEPRESSED OR HOPELESS: 0
SUM OF ALL RESPONSES TO PHQ9 QUESTIONS 1 & 2: 0
1. LITTLE INTEREST OR PLEASURE IN DOING THINGS: 0

## 2020-01-31 NOTE — PROGRESS NOTES
Yarelis Paredes, [facility]  [address]  [phone] [facility]. Mylene Peterson  [place], [address]  [phone]  [phone]    Liz Cordero is a 64 y.o. male who is here with c/o of:    Chief Complaint: Back Pain (slipped on ice 2 days ago ) and Elbow Pain (right elbow and its a burning pain )      Patient Accompanied by: n/a    HPI - Liz Cordero is here today with c/o:    Patient states he was getting out of his car and slipped on ice. When he slipped he fell back and caught himself with his right elbow on the car door. Since that time he has had swelling and pain to the lateral aspect of his right elbow and severe left lower back pain  He has not taken anything for his pain       Patient Active Problem List:     Dyslipidemia     Pneumoperitoneum     Diverticulitis of intestine with perforation     Parastomal hernia (HCC)     Past Medical History:   Diagnosis Date    Diverticular disease of colon       Past Surgical History:   Procedure Laterality Date    COLECTOMY  2/1/15        HERNIA REPAIR      JOINT REPLACEMENT      L [facility]KA    REVISION COLOSTOMY      SIGMOIDECTOMY  2/1/15    [facility]/ Dr. Anthony Best     Family History   Problem Relation Age of Onset    Cancer Mother         Breast CA     Social History     Tobacco Use    Smoking status: Former Smoker     Packs/day: 1.00     Years: 23.00     Pack years: 23.00     Types: Cigarettes     Last attempt to quit: 10/1/2008     Years since quittin.3    Smokeless tobacco: Never Used   Substance Use Topics    Alcohol use: Yes     Comment: few days a week     ALLERGIES:  No Known Allergies       Subjective     · Constitutional:  Negative for activity change, appetite change,unexpected weight change, chills, fever, and fatigue. · HENT: Negative for ear pain, sore throat,  Rhinorrhea, sinus pain, sinus pressure, congestion. · Eyes:  Negative for pain and discharge.    · Respiratory:  Negative for chest tightness, shortness of breath, wheezing, and cough. · Cardiovascular:  Negative for chest pain, palpitations and leg swelling. · Gastrointestinal: Negative for abdominal pain, blood in stool, constipation,diarrhea, nausea and vomiting. · Endocrine: Negative for cold intolerance, heat intolerance, polydipsia, polyphagia and polyuria. · Genitourinary: Negative for difficulty urinating, dysuria, flank pain, frequency, hematuria and urgency. · Musculoskeletal: Negative for arthralgias, joint swelling, myalgias, neck pain and neck stiffness. Positive for right elbow pain and swelling and back pain  · Skin: Negative for rash and wound. · Allergic/Immunologic: Negative for environmental allergies and food allergies. · Neurological:  Negative for dizziness, light-headedness, numbness and headaches. · Hematological:  Negative for adenopathy. Does not bruise/bleed easily. · Psychiatric/Behavioral: Negative for self-injury, sleep disturbance and suicidal ideas. Objective     PHYSICAL EXAM:   · Constitutional: Carson Part is oriented to person, place, and time. Vital signs are normal. Appears well-developed and well-nourished. · HEENT:   · Head: Normocephalic and atraumatic. Eyes:PERRL, EOMI, Conjunctiva normal, No discharge. · Neck: Full passive range of motion. Non-tender on palpation. Neck supple. No thyromegaly present. Trachea normal.  · Cardiovascular: Normal rate, regular rhythm, S1, S2, no murmur, no gallop, no friction rub, intact distal pulses. · Pulmonary/Chest: Breath sounds are clear throughout, No respiratory distress, No wheezing, No chest tenderness. Effort normal  Musculoskeletal: Physical Exam  Musculoskeletal:      Right elbow: He exhibits swelling (lateral). He exhibits normal range of motion, no effusion and no deformity. Tenderness found. Lateral epicondyle tenderness noted. Lumbar back: He exhibits tenderness (left lower ) and pain.  He exhibits normal range of motion, no bony tenderness, no swelling, no edema and no deformity. Comments: Severely antalgic gait     · Neurological: Alert and oriented to person, place, and time. Normal motor function, Normal sensory function, No focal deficits noted. He has normal strength. · Skin: Skin is warm, dry and intact. No obvious lesions on exposed skin  · Psychiatric: Normal mood and affect. Speech is normal and behavior is normal.     Nursing note and vitals reviewed. Blood pressure 138/84, pulse 67, temperature 97.7 °F (36.5 °C), temperature source Temporal, weight 239 lb (108.4 kg), SpO2 99 %. Body mass index is 30.69 kg/m². Wt Readings from Last 3 Encounters:   01/31/20 239 lb (108.4 kg)   09/05/19 234 lb 3.2 oz (106.2 kg)   06/25/19 238 lb 3.2 oz (108 kg)     BP Readings from Last 3 Encounters:   01/31/20 138/84   09/05/19 120/72   06/25/19 124/70       No results found for this visit on 01/31/20. Completed Orders/Prescriptions   Orders Placed This Encounter   Medications    ketorolac (TORADOL) injection 60 mg       Administrations This Visit     ketorolac (TORADOL) injection 60 mg     Admin Date  01/31/2020  13:28 Action  Given Dose  60 mg Route  Intramuscular Site  Dorsogluteal Left Administered By  Nori Tapia MA    Ordering Provider:  HONG Sandoval CNP    NDC:  3418-1138-33    Lot#:  [id number]    :  HOSPIRA    Patient Supplied?:  No                    AssessmentPlan/Medical Decision Making     1. Fall, initial encounter    2. Right elbow pain  - RICE  - ketorolac (TORADOL) injection 60 mg  - Xray right elbow    3. Acute left-sided low back pain without sciatica  - toradol injection 60mg      Return if symptoms worsen or fail to improve. 1.  Mark Barcenas received counseling on the following healthy behaviors: nutrition, exercise and medication adherence  2. Patient given educational materials - see patient instructions  3. Was a self-tracking handout given in paper form or via [employer]?  No  If yes,

## 2020-03-24 RX ORDER — SUMATRIPTAN 100 MG/1
TABLET, FILM COATED ORAL
Qty: 9 TABLET | Refills: 5 | Status: SHIPPED | OUTPATIENT
Start: 2020-03-24 | End: 2020-05-21

## 2020-03-24 NOTE — TELEPHONE ENCOUNTER
Next Visit Date:  No future appointments.     Health Maintenance   Topic Date Due    Hepatitis C screen  1960    HIV screen  04/02/1977    DTaP/Tdap/Td vaccine (1 - Tdap) 04/02/1981    Diabetes screen  04/02/2002    Lipid screen  05/25/2017    Flu vaccine (1) 06/30/2020 (Originally 9/1/2019)    Shingles Vaccine (1 of 2) 01/31/2021 (Originally 4/2/2012)    Colon cancer screen colonoscopy  06/06/2025    Hepatitis A vaccine  Aged Out    Hepatitis B vaccine  Aged Out    Hib vaccine  Aged Out    Meningococcal (ACWY) vaccine  Aged Out    Pneumococcal 0-64 years Vaccine  Aged Out       No results found for: LABA1C          ( goal A1C is < 7)   No results found for: LABMICR  LDL Cholesterol (mg/dL)   Date Value   07/23/2013 178 (H)   12/06/2012 216 (H)     LDL Calculated (mg/dL)   Date Value   05/25/2016 115       (goal LDL is <100)   AST (U/L)   Date Value   05/25/2016 19     ALT (U/L)   Date Value   05/25/2016 26     BUN (mg/dL)   Date Value   05/25/2016 17     BP Readings from Last 3 Encounters:   01/31/20 138/84   09/05/19 120/72   06/25/19 124/70          (goal 120/80)    All Future Testing planned in [facility]  Lab Frequency Next Occurrence               Patient Active Problem List:     Dyslipidemia     Pneumoperitoneum     Diverticulitis of intestine with perforation     Parastomal hernia ([address]

## 2020-05-21 RX ORDER — SUMATRIPTAN 100 MG/1
TABLET, FILM COATED ORAL
Qty: 9 TABLET | Refills: 5 | Status: SHIPPED | OUTPATIENT
Start: 2020-05-21 | End: 2020-07-13

## 2020-06-02 RX ORDER — ERENUMAB-AOOE 70 MG/ML
INJECTION SUBCUTANEOUS
Qty: 1 ML | Refills: 3 | Status: SHIPPED | OUTPATIENT
Start: 2020-06-02 | End: 2020-11-09

## 2020-07-01 ENCOUNTER — OFFICE VISIT (OUTPATIENT)
Dept: FAMILY MEDICINE CLINIC | Age: 60
End: 2020-07-01
Payer: COMMERCIAL

## 2020-07-01 VITALS
DIASTOLIC BLOOD PRESSURE: 80 MMHG | SYSTOLIC BLOOD PRESSURE: 130 MMHG | OXYGEN SATURATION: 97 % | TEMPERATURE: 97.3 F | WEIGHT: 227 LBS | BODY MASS INDEX: 29.13 KG/M2 | HEIGHT: 74 IN | HEART RATE: 87 BPM

## 2020-07-01 PROCEDURE — G8427 DOCREV CUR MEDS BY ELIG CLIN: HCPCS | Performed by: FAMILY MEDICINE

## 2020-07-01 PROCEDURE — 96372 THER/PROPH/DIAG INJ SC/IM: CPT | Performed by: FAMILY MEDICINE

## 2020-07-01 PROCEDURE — G8417 CALC BMI ABV UP PARAM F/U: HCPCS | Performed by: FAMILY MEDICINE

## 2020-07-01 PROCEDURE — 99213 OFFICE O/P EST LOW 20 MIN: CPT | Performed by: FAMILY MEDICINE

## 2020-07-01 PROCEDURE — 3017F COLORECTAL CA SCREEN DOC REV: CPT | Performed by: FAMILY MEDICINE

## 2020-07-01 PROCEDURE — 1036F TOBACCO NON-USER: CPT | Performed by: FAMILY MEDICINE

## 2020-07-01 RX ORDER — KETOROLAC TROMETHAMINE 30 MG/ML
60 INJECTION, SOLUTION INTRAMUSCULAR; INTRAVENOUS ONCE
Status: COMPLETED | OUTPATIENT
Start: 2020-07-01 | End: 2020-07-01

## 2020-07-01 RX ORDER — OXYCODONE HYDROCHLORIDE AND ACETAMINOPHEN 5; 325 MG/1; MG/1
1 TABLET ORAL EVERY 6 HOURS PRN
Qty: 28 TABLET | Refills: 0 | Status: SHIPPED | OUTPATIENT
Start: 2020-07-01 | End: 2020-10-12 | Stop reason: SDUPTHER

## 2020-07-01 RX ORDER — PREDNISONE 20 MG/1
TABLET ORAL
Qty: 20 TABLET | Refills: 0 | Status: SHIPPED | OUTPATIENT
Start: 2020-07-01 | End: 2020-10-12 | Stop reason: SDUPTHER

## 2020-07-01 RX ADMIN — KETOROLAC TROMETHAMINE 60 MG: 30 INJECTION, SOLUTION INTRAMUSCULAR; INTRAVENOUS at 11:31

## 2020-07-01 ASSESSMENT — ENCOUNTER SYMPTOMS
CONSTIPATION: 0
COUGH: 0
WHEEZING: 0
BACK PAIN: 1
ABDOMINAL PAIN: 0
SHORTNESS OF BREATH: 0
DIARRHEA: 0
BLOOD IN STOOL: 0

## 2020-07-01 NOTE — PROGRESS NOTES
Jl Hood is a 64 y.o. male who presents todayfor his medical conditions/complaints as noted below. Jl Hood is here today c/oLower Back Pain      :     HPI    LBP with left sided radicular sx after doing excessive work around his pool at home. Past Medical History:   Diagnosis Date    Diverticular disease of colon       Past Surgical History:   Procedure Laterality Date    COLECTOMY  2/1/15        HERNIA REPAIR      JOINT REPLACEMENT      L TKA    REVISION COLOSTOMY      SIGMOIDECTOMY  2/1/15    [facility]/ Dr. Cheyanne Nuno     Family History   Problem Relation Age of Onset    Cancer Mother         Breast CA     Social History     Tobacco Use    Smoking status: Former Smoker     Packs/day: 1.00     Years: 23.00     Pack years: 23.00     Types: Cigarettes     Last attempt to quit: 10/1/2008     Years since quittin.7    Smokeless tobacco: Never Used   Substance Use Topics    Alcohol use: Yes     Comment: few days a week      Current Outpatient Medications   Medication Sig Dispense Refill    Erenumab-aooe 140 MG/ML SOAJ Inject 140 mg into the skin once for 1 dose 1 pen 11    oxyCODONE-acetaminophen (PERCOCET) 5-325 MG per tablet Take 1 tablet by mouth every 6 hours as needed for Pain for up to 7 days. Intended supply: 7 days. Take lowest dose possible to manage pain 28 tablet 0    predniSONE (DELTASONE) 20 MG tablet Take 3 tabs daily for 3 days, then 2 a day for 3 days, then 1 a day for 3 days, then 1/2 a day for 3 days.  20 tablet 0    Erenumab-aooe (AIMOVIG) 70 MG/ML SOAJ inject 1 milliliter ( 70 milligrams ) subcutaneously Every Month in THE ABDOMEN,THIGH,OR OUTER AREA OF UPPER ARM 1 mL 3    SUMAtriptan (IMITREX) 100 MG tablet take 1 tablet by mouth once daily if needed for migraines 9 tablet 5    atorvastatin (LIPITOR) 20 MG tablet TAKE 1 TABLET DAILY 90 tablet 3    topiramate ER (TROKENDI XR) 100 MG CP24 Take 100 mg by mouth nightly 90 capsule 3  atorvastatin (LIPITOR) 20 MG tablet TAKE 1 TABLET DAILY 90 tablet 3    diclofenac (VOLTAREN) 75 MG EC tablet Take 1 tablet by mouth 2 times daily (with meals) 60 tablet 11     No current facility-administered medications for this visit. No Known Allergies      Subjective:   Review of Systems   Constitutional: Negative for chills, diaphoresis, fatigue and fever. HENT: Negative for congestion and hearing loss. Eyes: Negative for visual disturbance. Respiratory: Negative for cough, shortness of breath and wheezing. Cardiovascular: Negative for chest pain, palpitations and leg swelling. Gastrointestinal: Negative for abdominal pain, blood in stool, constipation and diarrhea. Genitourinary: Negative for dysuria. Musculoskeletal: Positive for back pain and gait problem. Negative for arthralgias and neck pain. Skin: Negative for rash. Neurological: Positive for weakness, numbness and headaches. Psychiatric/Behavioral: Negative for dysphoric mood and sleep disturbance.       :   /80   Pulse 87   Temp 97.3 °F (36.3 °C)   Ht 6' 2\" (1.88 m)   Wt 227 lb (103 kg)   SpO2 97%   BMI 29.15 kg/m²     Physical Exam  Constitutional:       General: He is not in acute distress. Appearance: He is well-developed. He is not diaphoretic. HENT:      Head: Normocephalic and atraumatic. Mouth/Throat:      Pharynx: No oropharyngeal exudate. Eyes:      General: No scleral icterus. Right eye: No discharge. Left eye: No discharge. Neck:      Musculoskeletal: Neck supple. Thyroid: No thyromegaly. Vascular: No carotid bruit. Cardiovascular:      Rate and Rhythm: Normal rate and regular rhythm. Heart sounds: Normal heart sounds. No murmur. No friction rub. No gallop. Pulmonary:      Effort: No respiratory distress. Breath sounds: Normal breath sounds. No wheezing or rales. Chest:      Chest wall: No tenderness. Abdominal:      Tenderness:  There is no abdominal tenderness. Musculoskeletal:         General: No tenderness. Lymphadenopathy:      Cervical: No cervical adenopathy. Skin:     Findings: No rash. Neurological:      Mental Status: He is alert and oriented to person, place, and time. Cranial Nerves: No cranial nerve deficit. Coordination: Coordination normal.   Psychiatric:         Behavior: Behavior normal.         Thought Content: Thought content normal.         Judgment: Judgment normal.         Assessment:       Diagnosis Orders   1. Lumbar disc disease with radiculopathy     2. Nonintractable headache, unspecified chronicity pattern, unspecified headache type  oxyCODONE-acetaminophen (PERCOCET) 5-325 MG per tablet         Plan:      Return if symptoms worsen or fail to improve. No orders of the defined types were placed in this encounter. Orders Placed This Encounter   Medications    Erenumab-aooe 140 MG/ML SOAJ     Sig: Inject 140 mg into the skin once for 1 dose     Dispense:  1 pen     Refill:  11    oxyCODONE-acetaminophen (PERCOCET) 5-325 MG per tablet     Sig: Take 1 tablet by mouth every 6 hours as needed for Pain for up to 7 days. Intended supply: 7 days. Take lowest dose possible to manage pain     Dispense:  28 tablet     Refill:  0     Reduce doses taken as pain becomes manageable    predniSONE (DELTASONE) 20 MG tablet     Sig: Take 3 tabs daily for 3 days, then 2 a day for 3 days, then 1 a day for 3 days, then 1/2 a day for 3 days. Dispense:  20 tablet     Refill:  0    ketorolac (TORADOL) injection 60 mg       Will double dose on aimovig 70 mg lasts about two weeks so will try 140. Acute back pain treated with toradol, prednisone, and pain relievers. See us prn.

## 2020-07-09 ENCOUNTER — TELEPHONE (OUTPATIENT)
Dept: FAMILY MEDICINE CLINIC | Age: 60
End: 2020-07-09

## 2020-07-13 RX ORDER — SUMATRIPTAN 100 MG/1
TABLET, FILM COATED ORAL
Qty: 9 TABLET | Refills: 5 | Status: SHIPPED | OUTPATIENT
Start: 2020-07-13 | End: 2020-09-02

## 2020-09-02 RX ORDER — SUMATRIPTAN 100 MG/1
TABLET, FILM COATED ORAL
Qty: 9 TABLET | Refills: 5 | Status: SHIPPED | OUTPATIENT
Start: 2020-09-02 | End: 2020-10-21

## 2020-09-02 NOTE — TELEPHONE ENCOUNTER
Amee Stewart is calling to request a refill on the following medication(s):    Medication Request:  Requested Prescriptions     Pending Prescriptions Disp Refills    SUMAtriptan (IMITREX) 100 MG tablet [Pharmacy Med Name: SUMATRIPTAN SUCC 100 MG TABLET] 9 tablet 5     Sig: take 1 tablet by mouth once daily if needed for migraines       Last Visit Date (If Applicable):  5/0/3081    Next Visit Date:    Visit date not found

## 2020-10-12 ENCOUNTER — OFFICE VISIT (OUTPATIENT)
Dept: FAMILY MEDICINE CLINIC | Age: 60
End: 2020-10-12
Payer: COMMERCIAL

## 2020-10-12 ENCOUNTER — NURSE TRIAGE (OUTPATIENT)
Dept: OTHER | Facility: CLINIC | Age: 60
End: 2020-10-12

## 2020-10-12 VITALS
SYSTOLIC BLOOD PRESSURE: 140 MMHG | WEIGHT: 235.8 LBS | DIASTOLIC BLOOD PRESSURE: 80 MMHG | HEART RATE: 93 BPM | TEMPERATURE: 98.6 F | HEIGHT: 74 IN | BODY MASS INDEX: 30.26 KG/M2 | OXYGEN SATURATION: 96 %

## 2020-10-12 PROCEDURE — 1036F TOBACCO NON-USER: CPT | Performed by: FAMILY MEDICINE

## 2020-10-12 PROCEDURE — 96372 THER/PROPH/DIAG INJ SC/IM: CPT | Performed by: FAMILY MEDICINE

## 2020-10-12 PROCEDURE — 99213 OFFICE O/P EST LOW 20 MIN: CPT | Performed by: FAMILY MEDICINE

## 2020-10-12 PROCEDURE — G8484 FLU IMMUNIZE NO ADMIN: HCPCS | Performed by: FAMILY MEDICINE

## 2020-10-12 PROCEDURE — 3017F COLORECTAL CA SCREEN DOC REV: CPT | Performed by: FAMILY MEDICINE

## 2020-10-12 PROCEDURE — G8427 DOCREV CUR MEDS BY ELIG CLIN: HCPCS | Performed by: FAMILY MEDICINE

## 2020-10-12 PROCEDURE — G8417 CALC BMI ABV UP PARAM F/U: HCPCS | Performed by: FAMILY MEDICINE

## 2020-10-12 RX ORDER — OXYCODONE HYDROCHLORIDE AND ACETAMINOPHEN 5; 325 MG/1; MG/1
1 TABLET ORAL EVERY 6 HOURS PRN
Qty: 28 TABLET | Refills: 0 | Status: SHIPPED | OUTPATIENT
Start: 2020-10-12 | End: 2020-10-19

## 2020-10-12 RX ORDER — KETOROLAC TROMETHAMINE 30 MG/ML
60 INJECTION, SOLUTION INTRAMUSCULAR; INTRAVENOUS ONCE
Status: COMPLETED | OUTPATIENT
Start: 2020-10-12 | End: 2020-10-12

## 2020-10-12 RX ORDER — PREDNISONE 20 MG/1
TABLET ORAL
Qty: 20 TABLET | Refills: 0 | Status: SHIPPED | OUTPATIENT
Start: 2020-10-12 | End: 2020-11-12

## 2020-10-12 RX ADMIN — KETOROLAC TROMETHAMINE 60 MG: 30 INJECTION, SOLUTION INTRAMUSCULAR; INTRAVENOUS at 16:41

## 2020-10-12 ASSESSMENT — PATIENT HEALTH QUESTIONNAIRE - PHQ9
SUM OF ALL RESPONSES TO PHQ9 QUESTIONS 1 & 2: 0
2. FEELING DOWN, DEPRESSED OR HOPELESS: 0
SUM OF ALL RESPONSES TO PHQ QUESTIONS 1-9: 0
1. LITTLE INTEREST OR PLEASURE IN DOING THINGS: 0
SUM OF ALL RESPONSES TO PHQ QUESTIONS 1-9: 0

## 2020-10-12 ASSESSMENT — ENCOUNTER SYMPTOMS
WHEEZING: 0
ABDOMINAL PAIN: 0
BACK PAIN: 1
SHORTNESS OF BREATH: 0
BLOOD IN STOOL: 0
COUGH: 0
CONSTIPATION: 0
DIARRHEA: 0

## 2020-10-12 NOTE — TELEPHONE ENCOUNTER
Reason for Disposition   Pain radiates into the thigh or further down the leg, and in both legs    Answer Assessment - Initial Assessment Questions  1. ONSET: \"When did the pain begin? \"       Saturday 3 days ago. 2. LOCATION: \"Where does it hurt? \" (upper, mid or lower back)      Lower back on left side. 3. SEVERITY: \"How bad is the pain? \"  (e.g., Scale 1-10; mild, moderate, or severe)    - MILD (1-3): doesn't interfere with normal activities     - MODERATE (4-7): interferes with normal activities or awakens from sleep     - SEVERE (8-10): excruciating pain, unable to do any normal activities      7/10 pain with movement    4. PATTERN: \"Is the pain constant? \" (e.g., yes, no; constant, intermittent)       Constant    5. RADIATION: \"Does the pain shoot into your legs or elsewhere? \"      Radiates down the back of left leg. 6. CAUSE:  \"What do you think is causing the back pain? \"       Pt was moving things around his yard last week and believes he \"overdid it\"    7. BACK OVERUSE:  [facility] recent lifting of heavy objects, strenuous work or exercise? \"      As above. 8. MEDICATIONS: \"What have you taken so far for the pain? \" (e.g., nothing, acetaminophen, NSAIDS)      Tylenol, advil    9. NEUROLOGIC SYMPTOMS: \"Do you have any weakness, numbness, or problems with bowel/bladder control? \"      None    10. OTHER SYMPTOMS: \"Do you have any other symptoms? \" (e.g., fever, abdominal pain, burning with urination, blood in urine)       Left ankle pain    11. PREGNANCY: \"Is there any chance you are pregnant? \" (e.g., yes, no; LMP)        N/A    Protocols used: BACK PAIN-ADULT-[place]    Pt reports lower left back pain that radiates down the back of left leg and left ankle pain for 3 days. States he was working in his yard and \"overdid it\". Pt rates pain 7/10. Reviewed for pt to be seen today by PCP. Warm transfer to [facility] in [facility].        Caller provided care advice and instructed to call back with worsening symptoms. Attention Provider: Thank you for allowing me to participate in the care of your patient. The patient was connected to triage in response to information provided to the [facility]. Please do not respond through this encounter as the response is not directed to a shared pool.

## 2020-10-12 NOTE — PROGRESS NOTES
Garland Kay is a 64 y.o. male who presents todayfor his medical conditions/complaints as noted below. Garland Kay is here today c/oLower Back Pain (left side)      : HPI    Left ankle pain early weekend then worsening pain in lower lumbar musculature and shooting pains down his left leg. Past Medical History:   Diagnosis Date    Diverticular disease of colon       Past Surgical History:   Procedure Laterality Date    COLECTOMY  2/1/15        HERNIA REPAIR      JOINT REPLACEMENT      L TKA    REVISION COLOSTOMY      SIGMOIDECTOMY  2/1/15    [facility]/ Dr. Snow Betancourt     Family History   Problem Relation Age of Onset    Cancer Mother         Breast CA     Social History     Tobacco Use    Smoking status: Former Smoker     Packs/day: 1.00     Years: 23.00     Pack years: 23.00     Types: Cigarettes     Last attempt to quit: 10/1/2008     Years since quittin.0    Smokeless tobacco: Never Used   Substance Use Topics    Alcohol use: Yes     Comment: few days a week      Current Outpatient Medications   Medication Sig Dispense Refill    oxyCODONE-acetaminophen (PERCOCET) 5-325 MG per tablet Take 1 tablet by mouth every 6 hours as needed for Pain for up to 7 days. Intended supply: 7 days. Take lowest dose possible to manage pain 28 tablet 0    predniSONE (DELTASONE) 20 MG tablet Take 3 tabs daily for 3 days, then 2 a day for 3 days, then 1 a day for 3 days, then 1/2 a day for 3 days.  20 tablet 0    SUMAtriptan (IMITREX) 100 MG tablet take 1 tablet by mouth once daily if needed for migraines 9 tablet 5    Erenumab-aooe (AIMOVIG) 70 MG/ML SOAJ inject 1 milliliter ( 70 milligrams ) subcutaneously Every Month in THE ABDOMEN,THIGH,OR OUTER AREA OF UPPER ARM 1 mL 3    atorvastatin (LIPITOR) 20 MG tablet TAKE 1 TABLET DAILY 90 tablet 3    topiramate ER (TROKENDI XR) 100 MG CP24 Take 100 mg by mouth nightly 90 capsule 3    atorvastatin (LIPITOR) 20 MG tablet TAKE 1 TABLET DAILY 90 tablet 3    diclofenac (VOLTAREN) 75 MG EC tablet Take 1 tablet by mouth 2 times daily (with meals) 60 tablet 11     No current facility-administered medications for this visit. No Known Allergies      Subjective:   Review of Systems   Constitutional: Negative for chills, diaphoresis, fatigue and fever. HENT: Negative for congestion and hearing loss. Eyes: Negative for visual disturbance. Respiratory: Negative for cough, shortness of breath and wheezing. Cardiovascular: Negative for chest pain, palpitations and leg swelling. Gastrointestinal: Negative for abdominal pain, blood in stool, constipation and diarrhea. Genitourinary: Negative for dysuria. Musculoskeletal: Positive for back pain and gait problem. Negative for arthralgias and neck pain. Skin: Negative for rash. Neurological: Negative for weakness, numbness and headaches. Psychiatric/Behavioral: Negative for dysphoric mood and sleep disturbance.       :   BP (!) 140/80   Pulse 93   Temp 98.6 °F (37 °C)   Ht 6' 2.02\" (1.88 m)   Wt 235 lb 12.8 oz (107 kg)   SpO2 96%   BMI 30.26 kg/m²     Physical Exam  Constitutional:       General: He is not in acute distress. Appearance: He is well-developed. He is not diaphoretic. HENT:      Head: Normocephalic and atraumatic. Mouth/Throat:      Pharynx: No oropharyngeal exudate. Eyes:      General: No scleral icterus. Right eye: No discharge. Left eye: No discharge. Neck:      Musculoskeletal: Neck supple. Thyroid: No thyromegaly. Vascular: No carotid bruit. Cardiovascular:      Rate and Rhythm: Normal rate and regular rhythm. Heart sounds: Normal heart sounds. No murmur. No friction rub. No gallop. Pulmonary:      Effort: No respiratory distress. Breath sounds: Normal breath sounds. No wheezing or rales. Chest:      Chest wall: No tenderness. Abdominal:      Tenderness: There is no abdominal tenderness. Musculoskeletal:         General: Tenderness (lumbar) and deformity (Ankle reconstruction rigid joint no pain) present. Right lower leg: No edema. Left lower leg: No edema. Lymphadenopathy:      Cervical: No cervical adenopathy. Skin:     Findings: No rash. Neurological:      Mental Status: He is alert and oriented to person, place, and time. Cranial Nerves: No cranial nerve deficit. Coordination: Coordination normal.   Psychiatric:         Behavior: Behavior normal.         Thought Content: Thought content normal.         Judgment: Judgment normal.         Assessment:       Diagnosis Orders   1. Chronic migraine without aura without status migrainosus, not intractable  Jamaica Herrera MD, Neurology, [place]   2. Nonintractable headache, unspecified chronicity pattern, unspecified headache type  oxyCODONE-acetaminophen (PERCOCET) 5-325 MG per tablet    Jamaica Herrera MD, Neurology, [place]         Plan:      Return if symptoms worsen or fail to improve. Orders Placed This Encounter   Procedures   Jamaica Herrera MD, Neurology, [facility]     Referral Priority:   Routine     Referral Type:   Eval and Treat     Referral Reason:   Specialty Services Required     Referred to Provider:   Riccardo Hess MD     Requested Specialty:   Neurology     Number of Visits Requested:   1     Orders Placed This Encounter   Medications    ketorolac (TORADOL) injection 60 mg    oxyCODONE-acetaminophen (PERCOCET) 5-325 MG per tablet     Sig: Take 1 tablet by mouth every 6 hours as needed for Pain for up to 7 days. Intended supply: 7 days. Take lowest dose possible to manage pain     Dispense:  28 tablet     Refill:  0     Reduce doses taken as pain becomes manageable    predniSONE (DELTASONE) 20 MG tablet     Sig: Take 3 tabs daily for 3 days, then 2 a day for 3 days, then 1 a day for 3 days, then 1/2 a day for 3 days.      Dispense:  20 tablet     Refill:  0     Tx as above for his acute injury  Neuro consult for his refractory migraines.

## 2020-10-21 RX ORDER — SUMATRIPTAN 100 MG/1
TABLET, FILM COATED ORAL
Qty: 9 TABLET | Refills: 5 | Status: SHIPPED | OUTPATIENT
Start: 2020-10-21 | End: 2020-11-12 | Stop reason: ALTCHOICE

## 2020-11-09 RX ORDER — ERENUMAB-AOOE 70 MG/ML
INJECTION SUBCUTANEOUS
Qty: 1 ML | Refills: 3 | Status: SHIPPED | OUTPATIENT
Start: 2020-11-09 | End: 2021-01-11 | Stop reason: ALTCHOICE

## 2020-11-12 ENCOUNTER — OFFICE VISIT (OUTPATIENT)
Dept: NEUROLOGY | Age: 60
End: 2020-11-12
Payer: COMMERCIAL

## 2020-11-12 VITALS
TEMPERATURE: 97.2 F | HEART RATE: 71 BPM | SYSTOLIC BLOOD PRESSURE: 144 MMHG | BODY MASS INDEX: 30.67 KG/M2 | WEIGHT: 239 LBS | DIASTOLIC BLOOD PRESSURE: 94 MMHG | HEIGHT: 74 IN

## 2020-11-12 PROBLEM — G43.719 INTRACTABLE CHRONIC MIGRAINE WITHOUT AURA AND WITHOUT STATUS MIGRAINOSUS: Status: ACTIVE | Noted: 2020-11-12

## 2020-11-12 PROCEDURE — 3017F COLORECTAL CA SCREEN DOC REV: CPT | Performed by: NURSE PRACTITIONER

## 2020-11-12 PROCEDURE — G8484 FLU IMMUNIZE NO ADMIN: HCPCS | Performed by: NURSE PRACTITIONER

## 2020-11-12 PROCEDURE — G8427 DOCREV CUR MEDS BY ELIG CLIN: HCPCS | Performed by: NURSE PRACTITIONER

## 2020-11-12 PROCEDURE — 99204 OFFICE O/P NEW MOD 45 MIN: CPT | Performed by: NURSE PRACTITIONER

## 2020-11-12 PROCEDURE — 1036F TOBACCO NON-USER: CPT | Performed by: NURSE PRACTITIONER

## 2020-11-12 PROCEDURE — G8417 CALC BMI ABV UP PARAM F/U: HCPCS | Performed by: NURSE PRACTITIONER

## 2020-11-12 RX ORDER — PREDNISONE 20 MG/1
TABLET ORAL
Qty: 18 TABLET | Refills: 0 | Status: SHIPPED | OUTPATIENT
Start: 2020-11-12 | End: 2021-01-11 | Stop reason: ALTCHOICE

## 2020-11-12 RX ORDER — BUTALBITAL, ACETAMINOPHEN AND CAFFEINE 50; 325; 40 MG/1; MG/1; MG/1
1 TABLET ORAL 2 TIMES DAILY PRN
Qty: 60 TABLET | Refills: 3 | Status: SHIPPED | OUTPATIENT
Start: 2020-11-12 | End: 2022-01-13

## 2020-11-12 RX ORDER — SUMATRIPTAN 100 MG/1
100 TABLET, FILM COATED ORAL
Qty: 12 TABLET | Refills: 3 | Status: SHIPPED | OUTPATIENT
Start: 2020-11-12 | End: 2021-01-11 | Stop reason: SDUPTHER

## 2020-11-12 RX ORDER — AMITRIPTYLINE HYDROCHLORIDE 25 MG/1
50 TABLET, FILM COATED ORAL NIGHTLY
Qty: 60 TABLET | Refills: 3 | Status: SHIPPED | OUTPATIENT
Start: 2020-11-12 | End: 2021-01-11 | Stop reason: SDUPTHER

## 2020-11-12 NOTE — PROGRESS NOTES
[facility]            [place], [address]          [facility], [address] [facility]          Dept: [phone]          Dept Fax: [phone]        MD Caren Munson, MD Ahmed B. Virgie Rod, MD Fredrica Hammans, MD Amy Branham, MD Ijeoma Nelson, CNP         New Patient Consultation    11/12/2020    HISTORY OF PRESENT ILLNESS:       I had the pleasure of seeing David Love for evaluation of headaches. The patient has had headaches for the past two years but they have gotten worse over the past few months. He was treated with Topamax by his family doctor which did not help him. He is now taking Imitrex 100 mg and Aimovig 140 mg which was started approximately 3 months ago. The patient has been taking Aimovig for about a year which did provide relief early on. It no longer helps him. Imitrex helps but sometimes he has to take more than one if he does not catch it right when it starts. The pain starts in his neck and radiates to the base of his skull. He states the pain is like a pressure with a 10/10 in intensity that makes it difficult to lay down or rest his head. They are associated with nausea and cold sweats and if he lays down in a dark room it will make his symptoms better. The patient is getting headaches every day. The patient denies any visual problems, balance issues, or dizziness. The patient reports he has bad back pain which he is currently taking Prednisone for. He is using heat and ice to relieve it. The pain starts in the left lumbar area that radiates down his left leg.        Headache location: neck and back of the head  Headache quality: pressure  Associated factors:  Nausea, cold sweats, photophobia  Intensity: 10/10  Headache chronicity: 2 years  Headache frequency: daily  Aggravating factors : laying down, neck pain  Relieving factors: Imitrex, rest  Prophylactic medications: Aimovig  Abortive medications: Imitrex  Previously used medications: Topamax          PAST MEDICAL HISTORY:         Diagnosis Date    Diverticular disease of colon         PAST SURGICAL HISTORY:         Procedure Laterality Date    COLECTOMY  2/1/15        HERNIA REPAIR      JOINT REPLACEMENT  2009    L TKA    REVISION COLOSTOMY      SIGMOIDECTOMY  2/1/15    [facility]/ Dr. Gavin Portillo HISTORY:     Social History     Socioeconomic History    Marital status:      Spouse name: Not on file    Number of children: Not on file    Years of education: Not on file    Highest education level: Not on file   Occupational History    Not on file   Social Needs    Financial resource strain: Patient refused    Food insecurity     Worry: Patient refused     Inability: Patient refused    Transportation needs     Medical: Patient refused     Non-medical: Patient refused   Tobacco Use    Smoking status: Former Smoker     Packs/day: 1.00     Years: 23.00     Pack years: 23.00     Types: Cigarettes     Last attempt to quit: 10/1/2008     Years since quittin.1    Smokeless tobacco: Never Used   Substance and Sexual Activity    Alcohol use: Yes     Comment: few days a week    Drug use: Never    Sexual activity: Not on file   Lifestyle    Physical activity     Days per week: Not on file     Minutes per session: Not on file    Stress: Not on file   Relationships    Social connections     Talks on phone: Not on file     Gets together: Not on file     Attends Alevism service: Not on file     Active member of club or organization: Not on file     Attends meetings of clubs or organizations: Not on file     Relationship status: Not on file    Intimate partner violence     Fear of current or ex partner: Not on file     Emotionally abused: Not on file     Physically abused: Not on file     Forced sexual activity: Not on file   Other Topics Concern    Not on file   Social History Narrative    ** Merged History Encounter **            CURRENT MEDICATIONS:     Current Outpatient Medications   Medication Sig Dispense Refill    amitriptyline (ELAVIL) 25 MG tablet Take 2 tablets by mouth nightly 60 tablet 3    predniSONE (DELTASONE) 20 MG tablet 3 tabs QD x 3 days then 2 tabs x 3 days then 1 tab QD x 3 days 18 tablet 0    SUMAtriptan (IMITREX) 100 MG tablet Take 1 tablet by mouth once as needed for Migraine 12 tablet 3    butalbital-acetaminophen-caffeine (FIORICET, ESGIC) -40 MG per tablet Take 1 tablet by mouth 2 times daily as needed for Headaches 60 tablet 3    AIMOVIG 70 MG/ML SOAJ inject 1 milliliter ( 70 milligrams ) subcutaneously Every Month in THE ABDOMEN,THIGH,OR OUTER AREA OF UPPER ARM 1 mL 3    atorvastatin (LIPITOR) 20 MG tablet TAKE 1 TABLET DAILY 90 tablet 3     No current facility-administered medications for this visit. ALLERGIES:   No Known Allergies                       All items selected indicate a positive finding. Those items not selected are negative.   Constitutional [] Weight loss/gain   [] Fatigue  [] Fever/Chills   HEENT [] Hearing Loss  [] Visual Disturbance  [] Tinnitus  [] Eye pain   Respiratory [] Shortness of Breath  [] Cough  [] Snoring   Cardiovascular [] Chest Pain  [] Palpitations  [] Lightheaded   GI [] Constipation  [] Diarrhea  [] Swallowing change  [x] Nausea/vomiting    [] Urinary Frequency  [] Urinary Urgency   Musculoskeletal [] Neck pain  [] Back pain  [] Muscle pain  [] Restless legs   Dermatologic [] Skin changes   Neurologic [] Memory loss/confusion  [] Seizures  [] Trouble walking or imbalance  [] Dizziness  [] Sleep disturbance  [] Weakness  [] Numbness  [] Tremors  [] Speech Difficulty  [x] Headaches  [] Light Sensitivity  [] Sound Sensitivity   Endocrinology []Excessive thirst  []Excessive hunger   Psychiatric [] Anxiety/Depression  [] Hallucination   Allergy/immunology []Hives/environmental allergies   Hematologic/lymph [] Abnormal bleeding  [] Abnormal bruising                   PHYSICAL EXAMINATION:       Vitals:    11/12/20 0753   BP: (!) 144/94   Pulse: 71   Temp: 97.2 °F (36.2 °C)                                              .                                                                                                     General Appearance:  Alert, cooperative, no signs of distress, appears stated age   Head:  Normocephalic, no signs of trauma   Eyes:  Conjunctiva/corneas clear;  eyelids intact   Ears:  Normal external ear and canals   Nose: Nares normal, mucosa normal, no drainage    Throat: Lips and tongue normal; teeth normal;  gums normal   Neck: Supple, intact flexion, extension and rotation;   trachea midline;  no adenopathy;   thyroid: not enlarged;   no carotid pulse abnormality   Back:   Symmetric, no curvature, ROM adequate   Lungs:   Respirations unlabored   Heart:  Regular rate and rhythm           Extremities: Extremities normal, no cyanosis, no edema   Pulses: Symmetric over head and neck   Skin: Skin color, texture normal, no rashes, no lesions                                     NEUROLOGIC EXAMINATION    Neurologic Exam    Mental status    Alert and oriented x 3; intact memory with no confusion, speech or language problems; no hallucinations or delusions  Fund of information appropriate for level of education    Cranial nerves    II - visual fields intact to confrontation  III, IV, VI - extra-ocular muscles full: no pupillary defect; no MAGGY, no nystagmus, no ptosis   V - normal facial sensation                                                               VII - normal facial symmetry                                                             VIII - intact hearing                                                                             IX, X - symmetrical palate                                                                  XI - symmetrical shoulder shrug XII - tongue midline without atrophy or fasciculation      Motor function  Normal muscle bulk and tone; strength 5/5 on all 4 extremities, no pronator drift      Sensory function Intact to light touch, pinprick, vibration, proprioception on all 4 extremities      Cerebellar Intact fine motor movement. No involuntary movements or tremors. No ataxia or dysmetria on finger to nose or heel to shin testing      Reflex function DTR 2+ on bilateral UE and LE, symmetric. Negative Babinski      Gait                   normal base and arm swing              ASSESSMENT AND PLAN:       Thank you very much for the kind referral of Reyna Gordillo. I look forward to working with you in the neurological care of your patient. 1. Chronic Migraine, The patient has been having headaches for two years but they have recently gotten worse  1. Start Amitriptyline 25 mg nightly titrate to 50 mg if necessary  2. Start Butalbital as rescue  3. Start Prednisone 10 mg for 9 days to break cycle of headaches  4. Continue Imitrex 100 mg PRN  5. Continue Aimovig 140 mg  injections monthly  6. Obtain MRI of the brain without contrast  7. The patient should return for reevaluation in 6 weeks with a headache log  8. If the patient gets more than 1 headache a week, Botox will be considered    Signed: Amada Molina CNP  Please note that this chart was generated using voice recognition Dragon dictation software. Although every effort was made to ensure the accuracy of this automated transcription, some errors in transcription may have occurred. Scribe Attestation:   By signing my name below, Mlevina Gomez, attest that this documentation has been prepared under the direction and in the presence of Amada Molina CNP.

## 2020-12-04 ENCOUNTER — TELEPHONE (OUTPATIENT)
Dept: FAMILY MEDICINE CLINIC | Age: 60
End: 2020-12-04

## 2020-12-24 ENCOUNTER — HOSPITAL ENCOUNTER (OUTPATIENT)
Dept: MRI IMAGING | Age: 60
Discharge: HOME | End: 2020-12-26
Payer: COMMERCIAL

## 2020-12-24 PROCEDURE — 70551 MRI BRAIN STEM W/O DYE: CPT

## 2021-01-11 ENCOUNTER — OFFICE VISIT (OUTPATIENT)
Dept: NEUROLOGY | Age: 61
End: 2021-01-11
Payer: COMMERCIAL

## 2021-01-11 VITALS
BODY MASS INDEX: 31.18 KG/M2 | DIASTOLIC BLOOD PRESSURE: 95 MMHG | HEIGHT: 74 IN | HEART RATE: 77 BPM | WEIGHT: 243 LBS | TEMPERATURE: 97.4 F | SYSTOLIC BLOOD PRESSURE: 151 MMHG

## 2021-01-11 DIAGNOSIS — G43.719 INTRACTABLE CHRONIC MIGRAINE WITHOUT AURA AND WITHOUT STATUS MIGRAINOSUS: Primary | ICD-10-CM

## 2021-01-11 PROCEDURE — 99213 OFFICE O/P EST LOW 20 MIN: CPT | Performed by: NURSE PRACTITIONER

## 2021-01-11 PROCEDURE — 1036F TOBACCO NON-USER: CPT | Performed by: NURSE PRACTITIONER

## 2021-01-11 PROCEDURE — 3017F COLORECTAL CA SCREEN DOC REV: CPT | Performed by: NURSE PRACTITIONER

## 2021-01-11 PROCEDURE — G8417 CALC BMI ABV UP PARAM F/U: HCPCS | Performed by: NURSE PRACTITIONER

## 2021-01-11 PROCEDURE — G8484 FLU IMMUNIZE NO ADMIN: HCPCS | Performed by: NURSE PRACTITIONER

## 2021-01-11 PROCEDURE — G8427 DOCREV CUR MEDS BY ELIG CLIN: HCPCS | Performed by: NURSE PRACTITIONER

## 2021-01-11 RX ORDER — ERENUMAB-AOOE 140 MG/ML
140 INJECTION, SOLUTION SUBCUTANEOUS
Qty: 1 ML | Refills: 5 | Status: SHIPPED | OUTPATIENT
Start: 2021-01-11 | End: 2021-08-12

## 2021-01-11 RX ORDER — AMITRIPTYLINE HYDROCHLORIDE 25 MG/1
50 TABLET, FILM COATED ORAL NIGHTLY
Qty: 60 TABLET | Refills: 5 | Status: SHIPPED | OUTPATIENT
Start: 2021-01-11 | End: 2021-03-24 | Stop reason: SDUPTHER

## 2021-01-11 RX ORDER — SUMATRIPTAN 100 MG/1
100 TABLET, FILM COATED ORAL
Qty: 12 TABLET | Refills: 5 | Status: SHIPPED | OUTPATIENT
Start: 2021-01-11 | End: 2021-04-19 | Stop reason: ALTCHOICE

## 2021-01-11 NOTE — PROGRESS NOTES
[facility]            [address]          [place], [address] [facility]          Dept: [phone]          Dept Fax: [phone]        MD Logan Avila MD Ahmed B. Charlee David, MD Alejandro Richters, MD Clemencia Plate, MD Tresa Pleasure, CNP            1/11/2021      HISTORY OF PRESENT ILLNESS:       I had the pleasure of seeing Nate García, who returns for continuing neurologic care. The patient was seen last on November 12, 2020 for treatment of chronic migraines. The patient was given a Prednisone taper which helped break his daily headache cycle. He was getting 1-2 headaches a week before he got COVID-19. The patient states his headaches were terrible at the time he got COVID-19. The patient was unable to get refills on his medications once he ran out of the first month. He continued to take the Aimovig as prescribed. Around Christmas time, the patient was getting a daily headache again without his medications. The patient had COVID-19 around Thanksgiving 2020. He went to the hospital and had a O2 saturation was 75%. He was found to have pneumonia and a pulmonary embolism. His headaches got worse at this time. He is now taking Eliquis for treatment of his pulmonary embolism    Headache location: neck and back of the head  Headache quality: pressure  Associated factors:  nausea, Photophobia, cold sweat  Intensity: 10/10  Headache chronicity: 2 years  Headache frequency: daily  Aggravating factors : laying down, neck pain, bright lights  Relieving factors: Imitrex, rest  Prophylactic medications: Amitriptyline, Aimovig  Abortive medications: Butalbital, Imitrex  Previously used medications: Topamax          Testing reviewed:  MRI Brain WO contrast (12/24/2020):   Impression   No acute intracranial abnormality.       Scattered foci of hyperintense signal mildly within the ex partner: Not on file     Emotionally abused: Not on file     Physically abused: Not on file     Forced sexual activity: Not on file   Other Topics Concern    Not on file   Social History Narrative    ** Merged History Encounter **            CURRENT MEDICATIONS:     Current Outpatient Medications   Medication Sig Dispense Refill    Apixaban (ELIQUIS PO) Take 2 tablets by mouth daily       Erenumab-aooe (AIMOVIG) 140 MG/ML SOAJ Inject 140 mg into the skin every 30 days 1 mL 5    amitriptyline (ELAVIL) 25 MG tablet Take 2 tablets by mouth nightly 60 tablet 3    SUMAtriptan (IMITREX) 100 MG tablet Take 1 tablet by mouth once as needed for Migraine 12 tablet 3    butalbital-acetaminophen-caffeine (FIORICET, ESGIC) -40 MG per tablet Take 1 tablet by mouth 2 times daily as needed for Headaches 60 tablet 3    atorvastatin (LIPITOR) 20 MG tablet TAKE 1 TABLET DAILY 90 tablet 3    predniSONE (DELTASONE) 20 MG tablet 3 tabs QD x 3 days then 2 tabs x 3 days then 1 tab QD x 3 days (Patient not taking: Reported on 1/11/2021) 18 tablet 0     No current facility-administered medications for this visit. ALLERGIES:   No Known Allergies                              REVIEW OF SYSTEMS        All items selected indicate a positive finding. Those items not selected are negative.   Constitutional [] Weight loss/gain   [] Fatigue  [] Fever/Chills   HEENT [] Hearing Loss  [] Visual Disturbance  [] Tinnitus  [] Eye pain   Respiratory [] Shortness of Breath  [] Cough  [] Snoring   Cardiovascular [] Chest Pain  [] Palpitations  [] Lightheaded   GI [] Constipation  [] Diarrhea  [] Swallowing change  [] Nausea/vomiting    [] Urinary Frequency  [] Urinary Urgency   Musculoskeletal [x] Neck pain  [] Back pain  [] Muscle pain  [] Restless legs   Dermatologic [] Skin changes   Neurologic [] Memory loss/confusion  [] Seizures  [] Trouble walking or imbalance  [] Dizziness  [] Sleep disturbance  [] Weakness  [] Numbness  [] Tremors  [] Speech Difficulty  [x] Headaches  [x] Light Sensitivity  [] Sound Sensitivity   Endocrinology []Excessive thirst  []Excessive hunger   Psychiatric [] Anxiety/Depression  [] Hallucination   Allergy/immunology []Hives/environmental allergies   Hematologic/lymph [] Abnormal bleeding  [] Abnormal bruising         PHYSICAL EXAMINATION:       Vitals:    01/11/21 0727   BP: (!) 151/95   Pulse:    Temp:                                               .                                                                                                    General Appearance:  Alert, cooperative, no signs of distress, appears stated age   Head:  Normocephalic, no signs of trauma   Eyes:  Conjunctiva/corneas clear;  eyelids intact   Ears:  Normal external ear and canals   Nose: Nares normal, mucosa normal, no drainage    Throat: Lips and tongue normal; teeth normal;  gums normal   Neck: Supple, intact flexion, extension and rotation;   trachea midline;  no adenopathy;   thyroid: not enlarged;   no carotid pulse abnormality   Back:   Symmetric, no curvature, ROM adequate   Lungs:   Respirations unlabored   Heart:  Regular rate and rhythm           Extremities: Extremities normal, no cyanosis, no edema   Pulses: Symmetric over head and neck   Skin: Skin color, texture normal, no rashes, no lesions                                     NEUROLOGIC EXAMINATION    Neurologic Exam  Mental status    Alert and oriented x 3; intact memory with no confusion, speech or language problems; no hallucinations or delusions  Fund of information appropriate for level of education    Cranial nerves    II - visual fields intact to confrontation bilaterally  III, IV, VI - extra-ocular muscles full: no pupillary defect; no MAGGY, no nystagmus, no ptosis   V - normal facial sensation                                                               VII - normal facial symmetry                                                             VIII - intact hearing                                                                             IX, X - symmetrical palate                                                                  XI - symmetrical shoulder shrug                                                       XII - tongue midline without atrophy or fasciculation      Motor function  Normal muscle bulk and tone; strength 5/5 on all 4 extremities, no pronator drift      Sensory function Intact to light touch, pinprick, vibration, proprioception on all 4 extremities      Cerebellar Intact fine motor movement. No involuntary movements or tremors. No ataxia or dysmetria on finger to nose or heel to shin testing      Reflex function DTR 2+ on bilateral UE and LE, symmetric. Negative Babinski      Gait                   normal base and arm swing                  Medical Decision Making: In summary, your patient, Hanna Ferreira exhibits the following, with associated plan:    1. Chronic Migraine, The patient has been having headaches for two years but they have recently gotten worse  1. Continue Amitriptyline 50 mg  2. Continue Butalbital as rescue  3. Continue Imitrex 100 mg PRN  4. Continue Aimovig 140 mg  injections monthly  5. The patient should return for reevaluation in 3 months with a headache log          Signed: Devika Somers CNP      *Please note that portions of this note were completed with a voice recognition program.  Although every effort was made to insure the accuracy of this automated transcription, some errors in transcription may have occurred, occasionally words and are mis-transcribed    Provider Attestation: The documentation recorded by the scribe accurately reflects the service I personally performed and the decisions made by myself. Portions of this note were transcribed by a scribe.  I personally performed the history, physical exam, and medical decision-making and confirm the accuracy of the information in the transcribed

## 2021-01-29 ENCOUNTER — TELEPHONE (OUTPATIENT)
Dept: NEUROLOGY | Age: 61
End: 2021-01-29

## 2021-01-29 NOTE — TELEPHONE ENCOUNTER
Patient called in has been getting headaches more frequent in days and strength again, He states that you said something about calling in a prednisone taper again if he needed it. Please advise. Patient is using [facility] in [place] .

## 2021-02-01 RX ORDER — PREDNISONE 20 MG/1
TABLET ORAL
Qty: 18 TABLET | Refills: 0 | Status: SHIPPED | OUTPATIENT
Start: 2021-02-01 | End: 2021-03-24 | Stop reason: ALTCHOICE

## 2021-02-15 DIAGNOSIS — E78.5 DYSLIPIDEMIA: Primary | ICD-10-CM

## 2021-02-15 RX ORDER — ATORVASTATIN CALCIUM 20 MG/1
TABLET, FILM COATED ORAL
Qty: 90 TABLET | Refills: 1 | Status: SHIPPED | OUTPATIENT
Start: 2021-02-15 | End: 2021-07-19

## 2021-02-15 NOTE — TELEPHONE ENCOUNTER
Zoe Romero is calling to request a refill on the following medication(s):    Medication Request:  Requested Prescriptions     Pending Prescriptions Disp Refills    atorvastatin (LIPITOR) 20 MG tablet 90 tablet 0     Sig: TAKE 1 TABLET DAILY       Last Visit Date (If Applicable):  75/26/1683    Next Visit Date:    2/17/2021

## 2021-02-17 ENCOUNTER — HOSPITAL ENCOUNTER (OUTPATIENT)
Age: 61
Setting detail: SPECIMEN
Discharge: HOME | End: 2021-02-17
Payer: COMMERCIAL

## 2021-02-17 ENCOUNTER — OFFICE VISIT (OUTPATIENT)
Dept: FAMILY MEDICINE CLINIC | Age: 61
End: 2021-02-17
Payer: COMMERCIAL

## 2021-02-17 VITALS
BODY MASS INDEX: 30.67 KG/M2 | HEIGHT: 74 IN | OXYGEN SATURATION: 98 % | DIASTOLIC BLOOD PRESSURE: 78 MMHG | SYSTOLIC BLOOD PRESSURE: 134 MMHG | WEIGHT: 239 LBS | HEART RATE: 91 BPM | TEMPERATURE: 97.3 F

## 2021-02-17 DIAGNOSIS — E78.5 DYSLIPIDEMIA: ICD-10-CM

## 2021-02-17 DIAGNOSIS — R06.02 SOB (SHORTNESS OF BREATH): ICD-10-CM

## 2021-02-17 DIAGNOSIS — U07.1 PNEUMONIA DUE TO COVID-19 VIRUS: ICD-10-CM

## 2021-02-17 DIAGNOSIS — I26.99 OTHER ACUTE PULMONARY EMBOLISM, UNSPECIFIED WHETHER ACUTE COR PULMONALE PRESENT (HCC): ICD-10-CM

## 2021-02-17 DIAGNOSIS — M51.16 LUMBAR DISC DISEASE WITH RADICULOPATHY: ICD-10-CM

## 2021-02-17 DIAGNOSIS — G43.709 CHRONIC MIGRAINE WITHOUT AURA WITHOUT STATUS MIGRAINOSUS, NOT INTRACTABLE: ICD-10-CM

## 2021-02-17 DIAGNOSIS — G89.29 CHRONIC KNEE PAIN AFTER TOTAL REPLACEMENT OF LEFT KNEE JOINT: ICD-10-CM

## 2021-02-17 DIAGNOSIS — R03.0 ELEVATED BLOOD PRESSURE READING WITHOUT DIAGNOSIS OF HYPERTENSION: ICD-10-CM

## 2021-02-17 DIAGNOSIS — Z96.652 CHRONIC KNEE PAIN AFTER TOTAL REPLACEMENT OF LEFT KNEE JOINT: ICD-10-CM

## 2021-02-17 DIAGNOSIS — U07.1 COVID-19 WITH MULTIPLE COMORBIDITIES: ICD-10-CM

## 2021-02-17 DIAGNOSIS — M25.562 CHRONIC KNEE PAIN AFTER TOTAL REPLACEMENT OF LEFT KNEE JOINT: ICD-10-CM

## 2021-02-17 DIAGNOSIS — J12.82 PNEUMONIA DUE TO COVID-19 VIRUS: ICD-10-CM

## 2021-02-17 DIAGNOSIS — M51.16 LUMBAR DISC DISEASE WITH RADICULOPATHY: Primary | ICD-10-CM

## 2021-02-17 PROBLEM — I10 ESSENTIAL HYPERTENSION: Status: ACTIVE | Noted: 2021-02-17

## 2021-02-17 PROCEDURE — 99214 OFFICE O/P EST MOD 30 MIN: CPT | Performed by: FAMILY MEDICINE

## 2021-02-17 PROCEDURE — G8417 CALC BMI ABV UP PARAM F/U: HCPCS | Performed by: FAMILY MEDICINE

## 2021-02-17 PROCEDURE — G8484 FLU IMMUNIZE NO ADMIN: HCPCS | Performed by: FAMILY MEDICINE

## 2021-02-17 PROCEDURE — G8427 DOCREV CUR MEDS BY ELIG CLIN: HCPCS | Performed by: FAMILY MEDICINE

## 2021-02-17 PROCEDURE — 1036F TOBACCO NON-USER: CPT | Performed by: FAMILY MEDICINE

## 2021-02-17 PROCEDURE — 3017F COLORECTAL CA SCREEN DOC REV: CPT | Performed by: FAMILY MEDICINE

## 2021-02-17 ASSESSMENT — ENCOUNTER SYMPTOMS
BLURRED VISION: 1
EYE PAIN: 0
SCALP TENDERNESS: 0
COUGH: 0
SWOLLEN GLANDS: 0
RHINORRHEA: 0
EYE WATERING: 0
PHOTOPHOBIA: 1
ALLERGIC/IMMUNOLOGIC NEGATIVE: 1
RESPIRATORY NEGATIVE: 1
VISUAL CHANGE: 0
SINUS PRESSURE: 0
ABDOMINAL PAIN: 0
VOMITING: 0
FACIAL SWEATING: 0
SHORTNESS OF BREATH: 0
SORE THROAT: 0
EYE REDNESS: 0
BACK PAIN: 1
NAUSEA: 1
BOWEL INCONTINENCE: 0

## 2021-02-17 ASSESSMENT — PATIENT HEALTH QUESTIONNAIRE - PHQ9
2. FEELING DOWN, DEPRESSED OR HOPELESS: 0
SUM OF ALL RESPONSES TO PHQ QUESTIONS 1-9: 0
1. LITTLE INTEREST OR PLEASURE IN DOING THINGS: 0
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
## 2021-02-17 NOTE — LETTER
CONTROLLED SUBSTANCE MEDICATION AGREEMENT     Patient Name: Leonor Leiva  Patient YOB: 1960   I understand, that controlled substance medications may be used to help better manage my symptoms and to improve my ability to function at home, work and in social settings. However, I also understand that these medications do have risks, which have been discussed with me, including possible development of physical or psychological dependence. I understand that successful treatment requires mutual trust and honesty between me and my provider. I understand and agree that following this Medication Agreement is necessary in continuing my provider-patient relationship and the success of my treatment plan. Explanation from my Provider: Benefits and Goals of Controlled Substance Medications: There are two potential goals for your treatment: (1) decreased pain and suffering (2) improved daily life functions. There are many possible treatments for your chronic condition(s). Alternatives such as physical therapy, yoga, massage, home daily exercise, meditation, relaxation techniques, injections, chiropractic manipulations, surgery, cognitive therapy, hypnosis and many medications that are not habit-forming may be used. Use of controlled substance medications may be helpful, but they are unlikely to resolve all symptoms or restore all function. Explanation from my Provider: Risks of Controlled Substance Medications:  Opioid pain medications: These medications can lead to problems such as addiction/dependence, sedation, lightheadedness/dizziness, memory issues, falls, constipation, nausea, or vomiting. They may also impair the ability to drive or operate machinery. Additionally, these medications may lower testosterone levels, leading to loss of bone strength, stamina and sex drive.   They may cause problems with breathing, sleep apnea and reduced coughing, which is especially dangerous for patients with lung disease. Overdose or dangerous interactions with alcohol and other medications may occur, leading to death. Hyperalgesia may develop, which means patients receiving opioids for the treatment of pain may become more sensitive to certain painful stimuli, and in some cases, experience pain from ordinarily non-painful stimuli. Women between the ages of 14-53 who could become pregnant should carefully weigh the risks and benefits of opioids with their physicians, as these medications increase the risk of pregnancy complications, including miscarriage,  delivery and stillbirth. It is also possible for babies to be born addicted to opioids. Opioid dependence withdrawal symptoms may include; feelings of uneasiness, increased pain, irritability, belly pain, diarrhea, sweats and goose-flesh. Benzodiazepines and non-benzodiazepine sleep medications: These medications can lead to problems such as addiction/dependence, sedation, fatigue, lightheadedness, dizziness, incoordination, falls, depression, hallucinations, and impaired judgment, memory and concentration. The ability to drive and operate machinery may also be affected. Abnormal sleep-related behaviors have been reported, including sleepwalking, driving, making telephone calls, eating, or having sex while not fully awake. These medications can suppress breathing and worsen sleep apnea, particularly when combined with alcohol or other sedating medications, potentially leading to death. Dependence withdrawal symptoms may include tremors, anxiety, hallucinations and seizures. Stimulants:  Common adverse effects include addiction/dependence, increased blood  pressure and heart rate, decreased appetite, nausea, involuntary weight loss, insomnia,                                                                                                                     Initials:_______   irritability, and headaches.   These risks may increase when these medications are combined with other stimulants, such as caffeine pills or energy drinks, certain weight loss supplements and oral decongestants. Dependence withdrawal symptoms may include depressed mood, loss of interest, suicidal thoughts, anxiety, fatigue, appetite changes and agitation. Testosterone replacement therapy:  Potential side effects include increased risk of stroke and heart attack, blood clots, increased blood pressure, increased cholesterol, enlarged prostate, sleep apnea, irritability/aggression and other mood disorders, and decreased fertility. I agree and understand that I and my prescriber have the following rights and responsibilities regarding my treatment plan:     1. MY RIGHTS:  To be informed of my treatment and medication plan. To be an active participant in my health and wellbeing. 2. MY RESPONSIBILITY AND UNDERSTANDING FOR USE OF MEDICATIONS   I will take medications at the dose and frequency as directed. For my safety, I will not increase or change how I take my medications without the recommendation of my healthcare provider.  I will actively participate in any program recommended by my provider which may improve function, including social, physical, psychological programs.  I will not take my medications with alcohol or other drugs not prescribed to me. I understand that drinking alcohol with my medications increases the chances of side effects, including reduced breathing rate and could lead to personal injury when operating machinery.  I understand that if I have a history of substance use disorders, including alcohol or other illicit drugs, that I may be at increased risk of addiction to my medications.  I agree to notify my provider immediately if I should become pregnant so that my treatment plan can be adjusted.    I agree and understand that I shall only receive controlled substance medications from the prescriber that signed this agreement unless there is written agreement among other prescribers of controlled substances outlining the responsibility of the medications being prescribed.  I understand that the if the controlled medication is not helping to achieve goals, the dosage may be tapered and no longer prescribed. 3. MY RESPONSIBILITY FOR COMMUNICATION / PRESCRIPTION RENEWALS   I agree that all controlled substance medications that I take will be prescribed only by my provider. If another healthcare provider prescribes me medication in an emergency, I will notify my provider within seventy-two (72) hours.  I will arrange for refills at the prescribed interval ONLY during regular office hours. I will not ask for refills earlier than agreed, after-hours, on holidays or weekends. Refills may take up to 72 hours for processing and prescriptions to reach the pharmacy.  I will inform my other health care providers that I am taking these medications and of the existence of this [id number]. In the event of an emergency, I will provide the same information to the emergency department prescribers.  I will keep my provider updated on the pharmacy I am using for controlled medication prescription filling. Initials:_______  4. MY RESPONSIBILITY FOR PROTECTING MEDICATIONS   I will protect my prescriptions and medications. I understand that lost or misplaced prescriptions will not be replaced.  I will keep medications only for my own use and will not share them with others. I will keep all medications away from children.  I agree that if my medications are adjusted or discontinued, I will properly dispose of any remaining medications. I understand that I will be required to dispose of any remaining controlled medications as, directed by my prescriber, prior to being provided with any prescriptions for other controlled medications.   Medication drop box locations can be found at: [email or web address]    5. MY RESPONSIBILITY WITH ILLEGAL DRUGS    I will not use illegal or street drugs or another person's prescription medications not prescribed to me.  If there are identified addiction type symptoms, then referral to a program may be provided by my provider and I agree to follow through with this recommendation. 6. MY RESPONSIBILITY FOR COOPERATION WITH INVESTIGATIONS   I understand that my provider will comply with any applicable law and may discuss my use and/or possible misuse/abuse of controlled substances and alcohol, as appropriate, with any health care provider involved in my care, pharmacist, or legal authority.  I authorize my provider and pharmacy to cooperate fully with law enforcement agencies (as permitted by law) in the investigation of any possible misuse, sale, or other diversion of my controlled substances.  I agree to waive any applicable privilege or right of privacy or confidentiality with respect to these authorizations. 7. PROVIDERS RIGHT TO MONITOR FOR SAFETY: PRESCRIPTION MONITORING / DRUG TESTING   I consent to drug/toxicology screening and will submit to a drug screen upon my providers request to assure I am only taking the prescribed drugs for my safety monitoring. I understand that a drug screen is a laboratory test in which a sample of my urine, blood or saliva is checked to see what drugs I have been taking. This may entail an observed urine specimen, which means that a nurse or other health care provider may watch me provide urine, and I will cooperate if I am asked to provide an observed specimen.  I understand that my provider will check a copy of my State Prescription Monitoring Program () Report in order to safely prescribe medications.  Pill Counts: I consent to pill counts when requested.   I may be asked to bring all my prescribed contact my HIPAA contact if there are concerns about my safety and use of the controlled medications. I have agreed to use the prescribed controlled substance medications to me as instructed by my provider and as stated in this Medication Agreement. My initial on each page and my signature below shows that I have read each page and I have had the opportunity to ask questions with answers provided by my provider.     Patient Name (Printed): _____________________________________  Patient Signature:  ______________________   Date: _____________    Prescriber Name (Printed): ___________________________________  Prescriber Signature: _____________________  Date: _____________

## 2021-02-17 NOTE — PATIENT INSTRUCTIONS
Patient Education        Pulmonary Embolism: Care Instructions  Your Care Instructions     Pulmonary embolism is the sudden blockage of an artery in the lung. Blood clots in the deep veins of the leg or pelvis (deep vein thrombosis, or DVT) are the most common cause. These blood clots can travel to the lungs. Pulmonary embolism can be very serious. Because you have had one pulmonary embolism, you are at greater risk for having another one. But you can take steps to prevent another pulmonary embolism by following your doctor's instructions. You will probably take a prescription blood-thinning medicine to prevent blood clots. A blood thinner can stop a blood clot from growing larger and prevent new clots from forming. Follow-up care is a key part of your treatment and safety. Be sure to make and go to all appointments, and call your doctor if you are having problems. It's also a good idea to know your test results and keep a list of the medicines you take. How can you care for yourself at home? · Take your medicines exactly as prescribed. Call your doctor if you think you are having a problem with your medicine. You will get more details on the specific medicines your doctor prescribes. · If you are taking a blood thinner, be sure you get instructions about how to take your medicine safely. Blood thinners can cause serious bleeding problems. Preventing future pulmonary embolisms  · Exercise. Keep blood moving in your legs to keep clots from forming. If you are traveling by car, stop every hour or so. Get out and walk around for a few minutes. If you are traveling by bus, train, or plane, get out of your seat and walk up and down the aisles every hour or so. You also can do leg exercises while you are seated. Pump your feet up and down by pulling your toes up toward your knees then pointing them down. · Get up out of bed as soon as possible after an illness or surgery. · Do not smoke.  If you need help quitting, talk to your doctor about stop-smoking programs and medicines. These can increase your chances of quitting for good. · Check with your doctor before taking hormone or birth control pills. These may increase your risk of blood clots. · Ask your doctor about wearing compression stockings to help prevent blood clots in your legs. There are different types of stockings, and they need to fit right. So your doctor will recommend what you need. When should you call for help? Call 911 anytime you think you may need emergency care. For example, call if:    · You have shortness of breath.     · You have chest pain.     · You passed out (lost consciousness).     · You cough up blood. Call your doctor now or seek immediate medical care if:    · You have new or worsening pain or swelling in your leg. Watch closely for changes in your health, and be sure to contact your doctor if:    · You do not get better as expected. Where can you learn more? Go to [email or web address] and sign in to your [employer] account. Enter B780 in the [employer] box to learn more about \"Pulmonary Embolism: Care Instructions. \"     If you do not have an account, please click on the \"Sign Up Now\" link. Current as of: March 4, 2020               Content Version: 12.6  © 4047-7054 [employer]. Care instructions adapted under license by [facility] ([facility]). If you have questions about a medical condition or this instruction, always ask your healthcare professional. [place] [address] any warranty or liability for your use of this information.

## 2021-02-17 NOTE — ASSESSMENT & PLAN NOTE
Will have patient get BP cuff at store and given BP log  Will review in 2 weeks  This is barring him from getting dental cleaning done

## 2021-02-19 DIAGNOSIS — M25.562 LEFT KNEE PAIN, UNSPECIFIED CHRONICITY: Primary | ICD-10-CM

## 2021-02-24 ENCOUNTER — OFFICE VISIT (OUTPATIENT)
Dept: ORTHOPEDIC SURGERY | Age: 61
End: 2021-02-24
Payer: COMMERCIAL

## 2021-02-24 VITALS — WEIGHT: 239 LBS | BODY MASS INDEX: 30.67 KG/M2 | HEIGHT: 74 IN

## 2021-02-24 DIAGNOSIS — M25.562 LEFT LATERAL KNEE PAIN: Primary | ICD-10-CM

## 2021-02-24 DIAGNOSIS — Z96.652 HISTORY OF TOTAL LEFT KNEE REPLACEMENT: ICD-10-CM

## 2021-02-24 PROCEDURE — G8417 CALC BMI ABV UP PARAM F/U: HCPCS | Performed by: ORTHOPAEDIC SURGERY

## 2021-02-24 PROCEDURE — G8427 DOCREV CUR MEDS BY ELIG CLIN: HCPCS | Performed by: ORTHOPAEDIC SURGERY

## 2021-02-24 PROCEDURE — G8484 FLU IMMUNIZE NO ADMIN: HCPCS | Performed by: ORTHOPAEDIC SURGERY

## 2021-02-24 PROCEDURE — 99244 OFF/OP CNSLTJ NEW/EST MOD 40: CPT | Performed by: ORTHOPAEDIC SURGERY

## 2021-02-24 ASSESSMENT — ENCOUNTER SYMPTOMS
COUGH: 0
CONSTIPATION: 0
DIARRHEA: 0
NAUSEA: 0

## 2021-02-24 NOTE — PROGRESS NOTES
0    Apixaban (ELIQUIS PO) Take 2 tablets by mouth daily       Erenumab-aooe (AIMOVIG) 140 MG/ML SOAJ Inject 140 mg into the skin every 30 days 1 mL 5    amitriptyline (ELAVIL) 25 MG tablet Take 2 tablets by mouth nightly 60 tablet 5    butalbital-acetaminophen-caffeine (FIORICET, ESGIC) -40 MG per tablet Take 1 tablet by mouth 2 times daily as needed for Headaches 60 tablet 3    SUMAtriptan (IMITREX) 100 MG tablet Take 1 tablet by mouth once as needed for Migraine 12 tablet 5     No current facility-administered medications for this visit. Allergies:    Patient has no known allergies.     Social History:   Social History     Socioeconomic History    Marital status:      Spouse name: Not on file    Number of children: Not on file    Years of education: Not on file    Highest education level: Not on file   Occupational History    Not on file   Social Needs    Financial resource strain: Patient refused    Food insecurity     Worry: Patient refused     Inability: Patient refused    Transportation needs     Medical: Patient refused     Non-medical: Patient refused   Tobacco Use    Smoking status: Former Smoker     Packs/day: 1.00     Years: 23.00     Pack years: 23.00     Types: Cigarettes     Quit date: 10/1/2008     Years since quittin.4    Smokeless tobacco: Never Used   Substance and Sexual Activity    Alcohol use: Yes     Comment: few days a week    Drug use: Never    Sexual activity: Not on file   Lifestyle    Physical activity     Days per week: Not on file     Minutes per session: Not on file    Stress: Not on file   Relationships    Social connections     Talks on phone: Not on file     Gets together: Not on file     Attends Spiritism service: Not on file     Active member of club or organization: Not on file     Attends meetings of clubs or organizations: Not on file     Relationship status: Not on file    Intimate partner violence     Fear of current or ex partner: Not on file     Emotionally abused: Not on file     Physically abused: Not on file     Forced sexual activity: Not on file   Other Topics Concern    Not on file   Social History Narrative    ** Merged History Encounter **            Family History:  Family History   Problem Relation Age of Onset    Cancer Mother         Breast CA         REVIEW OF SYSTEMS:  Review of Systems   Constitutional: Negative for chills and fever. Respiratory: Negative for cough. Gastrointestinal: Negative for constipation, diarrhea and nausea. Musculoskeletal: Positive for arthralgias (left knee, left ankle). Negative for gait problem, joint swelling and myalgias. Neurological: Negative for dizziness, weakness and numbness. I have reviewed the CC, HPI, ROS, PMH, FHX, Social History, and if not present in this note, I have reviewed in the patient's chart. I agree with the documentation provided by other staff and have reviewed their documentation prior to providing my signature indicating agreement. PHYSICAL EXAM:  Ht 6' 2\" (1.88 m)   Wt 239 lb (108.4 kg)   BMI 30.69 kg/m²  Body mass index is 30.69 kg/m². Physical Exam  Gen: alert and oriented to person and place. Psych:  Appropriate affect; Appropriate knowledge base; Appropriate mood; No hallucinations; Head: normocephalic, atraumatic   Chest: symmetric chest excursion; nonlabored respiratory effort. Pelvis: stable; no obvious pelvis deformity  Ortho Exam  Extremity: Well healed incisions left knee no gross signs of infection left knee appreciated. . Lateral hip pain with left hip log roll. Tender posterior lateral epicondyle left knee. Mild start up limp. No instability of the left knee is appreciated. Patient has full range of motion of the left knee. Patient ambulates without significant antalgia or short weightbearing phase to the left lower extremity.   Motor, sensory, vascular examination of the left lower extremity is grossly intact without focal deficits. Radiology:         Xr Knee Left (3 Views)    Result Date: 2/24/2021  History: Status post left total knee arthroplasty Findings: Standing AP, lateral, merchant view x-rays of the left knee done the office today shows total knee arthroplasty in good position without specific complications. 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 No further evidence of fracture, subluxation, dislocation, radiopaque foreign body, radiopaque tumors noted. Impression: Left total knee arthroplasty in good position as described above. ASSESSMENT:     1. Left lateral knee pain    2. History of total left knee replacement         PLAN:       Went over radiographs with patient. I believe he has an iliotibial band syndrome causing pain over the lateral epicondyle of the knee. Discussed with him that all components of his left knee replacement looks stable with no signs of loosening. Discussed with patient that we will start conservative with ice rubs over the point of maximum tenderness to his left knee. Will see patient back in 2 weeks and see if that helps and consideration for corticosteroid injection lateral epicondyle left knee could be made in the future if necessary. When patient shows up in two weeks will get LEFT ANKLE x-rays and if patients left knee is still painful will consider corticosteroid injection to the left knee. Return in about 2 weeks (around 3/10/2021) for re- evaluation. No orders of the defined types were placed in this encounter. No orders of the defined types were placed in this encounter. Gina Ramírez RN am scribing for and in the presence of Dr. Jun Yee  2/24/2021 11:34 AM      I have reviewed and made changes accordingly to the work scribed by Gigi Calvo RN.   The documentation accurately reflects work and decisions made by me. I have also reviewed documentation completed by clinical staff.     Katie Pantoja DO, [facility]  2/24/2021 11:35 AM    This note is created with the assistance of a speech recognition program.  While intending to generate a document that actually reflects the content of the visit, the document can still have some errors including those of syntax and sound a like substitutions which may escape proof reading.  In such instances, actual meaning can be extrapolated by contextual diversion      Electronically signed by Juan Elise DO, FAOAO on 2/24/2021 at 11:34 AM

## 2021-02-26 LAB
6-ACETYLMORPHINE, UR: NOT DETECTED
7-AMINOCLONAZEPAM, URINE: NOT DETECTED
ALPHA-OH-ALPRAZ, URINE: NOT DETECTED
ALPHA-OH-MIDAZOLAM, URINE: NOT DETECTED
ALPRAZOLAM, URINE: NOT DETECTED
AMPHETAMINES, URINE: NOT DETECTED
BARBITURATES, URINE: PRESENT
BENZOYLECGONINE, UR: NOT DETECTED
BUPRENORPHINE URINE: NOT DETECTED
CARISOPRODOL, UR: NOT DETECTED
CLONAZEPAM, URINE: NOT DETECTED
CODEINE, URINE: NOT DETECTED
CREATININE URINE: 75 MG/DL (ref 20–400)
DIAZEPAM, URINE: NOT DETECTED
DRUGS EXPECTED, UR: NORMAL
EER HI RES INTERP UR: NORMAL
ETHYL GLUCURONIDE UR: NOT DETECTED
FENTANYL URINE: NOT DETECTED
GABAPENTIN: NOT DETECTED
HYDROCODONE, URINE: NOT DETECTED
HYDROMORPHONE, URINE: NOT DETECTED
LORAZEPAM, URINE: NOT DETECTED
MARIJUANA METAB, UR: NOT DETECTED
MDA, UR: NOT DETECTED
MDEA, EVE, UR: NOT DETECTED
MDMA URINE: NOT DETECTED
MEPERIDINE METAB, UR: NOT DETECTED
METHADONE, URINE: NOT DETECTED
METHAMPHETAMINE, URINE: NOT DETECTED
METHYLPHENIDATE: NOT DETECTED
MIDAZOLAM, URINE: NOT DETECTED
MORPHINE URINE: NOT DETECTED
NALOXONE URINE: NOT DETECTED
NORBUPRENORPHINE, URINE: NOT DETECTED
NORDIAZEPAM, URINE: NOT DETECTED
NORFENTANYL, URINE: NOT DETECTED
NORHYDROCODONE, URINE: NOT DETECTED
NOROXYCODONE, URINE: NOT DETECTED
NOROXYMORPHONE, URINE: NOT DETECTED
OXAZEPAM, URINE: NOT DETECTED
OXYCODONE URINE: NOT DETECTED
OXYMORPHONE, URINE: NOT DETECTED
PAIN MANAGEMENT DRUG PANEL INTERP, URINE: NORMAL
PAIN MGT DRUG PANEL, HI RES, UR: NORMAL
PCP,URINE: NOT DETECTED
PHENTERMINE, UR: NOT DETECTED
PREGABALIN: NOT DETECTED
PROPOXYPHENE, URINE: NORMAL
TAPENTADOL, URINE: NOT DETECTED
TAPENTADOL-O-SULFATE, URINE: NOT DETECTED
TEMAZEPAM, URINE: NOT DETECTED
TRAMADOL, URINE: NOT DETECTED
ZOLPIDEM METABOLITE (ZCA), URINE: NOT DETECTED
ZOLPIDEM, URINE: NOT DETECTED

## 2021-03-04 DIAGNOSIS — M25.572 LEFT ANKLE PAIN, UNSPECIFIED CHRONICITY: Primary | ICD-10-CM

## 2021-03-10 ENCOUNTER — OFFICE VISIT (OUTPATIENT)
Dept: ORTHOPEDIC SURGERY | Age: 61
End: 2021-03-10
Payer: COMMERCIAL

## 2021-03-10 VITALS — BODY MASS INDEX: 30.67 KG/M2 | HEIGHT: 74 IN | WEIGHT: 239 LBS

## 2021-03-10 DIAGNOSIS — M19.072 ARTHRITIS OF LEFT ANKLE: ICD-10-CM

## 2021-03-10 DIAGNOSIS — G89.29 CHRONIC PAIN OF LEFT ANKLE: ICD-10-CM

## 2021-03-10 DIAGNOSIS — M25.572 CHRONIC PAIN OF LEFT ANKLE: ICD-10-CM

## 2021-03-10 DIAGNOSIS — Z96.652 HISTORY OF TOTAL LEFT KNEE REPLACEMENT: Primary | ICD-10-CM

## 2021-03-10 DIAGNOSIS — M79.672 FOOT PAIN, LEFT: Primary | ICD-10-CM

## 2021-03-10 PROCEDURE — 3017F COLORECTAL CA SCREEN DOC REV: CPT | Performed by: ORTHOPAEDIC SURGERY

## 2021-03-10 PROCEDURE — 99214 OFFICE O/P EST MOD 30 MIN: CPT | Performed by: ORTHOPAEDIC SURGERY

## 2021-03-10 PROCEDURE — G8484 FLU IMMUNIZE NO ADMIN: HCPCS | Performed by: ORTHOPAEDIC SURGERY

## 2021-03-10 PROCEDURE — G8427 DOCREV CUR MEDS BY ELIG CLIN: HCPCS | Performed by: ORTHOPAEDIC SURGERY

## 2021-03-10 PROCEDURE — G8417 CALC BMI ABV UP PARAM F/U: HCPCS | Performed by: ORTHOPAEDIC SURGERY

## 2021-03-10 PROCEDURE — 1036F TOBACCO NON-USER: CPT | Performed by: ORTHOPAEDIC SURGERY

## 2021-03-10 ASSESSMENT — ENCOUNTER SYMPTOMS
CONSTIPATION: 0
DIARRHEA: 0
NAUSEA: 0
COUGH: 0

## 2021-03-10 NOTE — PROGRESS NOTES
Body mass index is 30.69 kg/m². General: Maya Dumont is a 64 y.o. male who is alert and oriented and sitting comfortably in our office. Ortho Exam  MS:  Mild diffuse swelling left ankle. No outward deformity of the left ankle is noted otherwise. DF=5, PF=20, E=2, I=2. Tender over ATFL on the left. Incisions well healed left ankle. Mild limp. No instability of the left ankle is appreciated. Negative anterior drawer and talar tilt test is appreciated. Negative proximal tibia-fibula squeeze test is noted. Left knee 0-105. Well healed incision left knee. Motor, sensory, vascular examination to the left lower extremity is grossly intact without focal deficits. Neuro: alert and oriented to person and place. Eyes: Extra-ocular muscles intact  Mouth: Oral mucosa moist. No perioral lesions  Pulm: Respirations unlabored and regular. Symmetric chest excursion without outward deformity is noted. Skin: warm, well perfused  Psych:   Patient has good fund of knowledge and displays understanging of exam, diagnosis, and plan. Radiology:     Xr Knee Left (3 Views)    Result Date: 2/24/2021  History: Status post left total knee arthroplasty Findings: Standing AP, lateral, merchant view x-rays of the left knee done the office today shows total knee arthroplasty in good position without specific complications. 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 No further evidence of fracture, subluxation, dislocation, radiopaque foreign body, radiopaque tumors noted. Impression: Left total knee arthroplasty in good position as described above.     Xr Ankle Left (min 3 Views)    Result Date: 3/11/2021  History: Left ankle pain Findings: Nonweightbearing AP, lateral, mortise view x-rays of the left ankle done in the office today shows mild to moderate joint space narrowing periarticular osteophytosis joint line sclerosis and subchondral cystic changes. Hardware distal tibia is appreciated multiple planes in good position without complications. No further evidence of fracture, subluxation, dislocation, radiopaque foreign body, radiopaque tumors noted. Impression: Left ankle degenerative changes status post open reduction and internal fixation distal tibia as described above with hardware in good position without complications. Assessment:      1. History of total left knee replacement    2. Arthritis of left ankle       Plan:       Went over radiographs with patient. For patients left knee will go forward with a bone scan and labs to rule out loosening and infection of his left total knee arthroplasty, For patients left ankle will get him into physical therapy for range of motion and strengthening. Will also refer him to  for to evaluate and the his left ankle. Follow up for left knee after labs and imaging are done. Follow up:Return for re- evaluation. No orders of the defined types were placed in this encounter.         Orders Placed This Encounter   Procedures    NM BONE SCAN 3 PHASE     Standing Status:   Future     Standing Expiration Date:   3/11/2022     Order Specific Question:   Reason for exam:     Answer:   left knee TKA, eval for infection/loosening    CBC     Standing Status:   Future     Standing Expiration Date:   3/11/2022    Comprehensive Metabolic Panel     Standing Status:   Future     Standing Expiration Date:   3/11/2022    C-Reactive Protein     Standing Status:   Future     Standing Expiration Date:   3/11/2022    Sedimentation Rate     Standing Status:   Future     Standing Expiration Date:   3/11/2022    Ambulatory referral to Physical Therapy     Referral Priority:   Routine     Referral Type:   Eval and Treat     Referral Reason:   Specialty Services Required     Number of Visits Requested:   1     I, Lady Joan RN am

## 2021-03-22 ENCOUNTER — HOSPITAL ENCOUNTER (OUTPATIENT)
Dept: PHYSICAL THERAPY | Facility: CLINIC | Age: 61
Setting detail: THERAPIES SERIES
Discharge: HOME | End: 2021-03-22
Payer: COMMERCIAL

## 2021-03-22 PROCEDURE — 97161 PT EVAL LOW COMPLEX 20 MIN: CPT

## 2021-03-22 PROCEDURE — 97110 THERAPEUTIC EXERCISES: CPT
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
## 2021-03-24 ENCOUNTER — TELEPHONE (OUTPATIENT)
Dept: NEUROLOGY | Age: 61
End: 2021-03-24

## 2021-03-24 ENCOUNTER — OFFICE VISIT (OUTPATIENT)
Dept: NEUROLOGY | Age: 61
End: 2021-03-24
Payer: COMMERCIAL

## 2021-03-24 VITALS
HEART RATE: 83 BPM | BODY MASS INDEX: 30.31 KG/M2 | TEMPERATURE: 97.2 F | HEIGHT: 74 IN | WEIGHT: 236.2 LBS | SYSTOLIC BLOOD PRESSURE: 146 MMHG | DIASTOLIC BLOOD PRESSURE: 93 MMHG

## 2021-03-24 DIAGNOSIS — G43.719 INTRACTABLE CHRONIC MIGRAINE WITHOUT AURA AND WITHOUT STATUS MIGRAINOSUS: Primary | ICD-10-CM

## 2021-03-24 PROCEDURE — 1036F TOBACCO NON-USER: CPT | Performed by: NURSE PRACTITIONER

## 2021-03-24 PROCEDURE — G8417 CALC BMI ABV UP PARAM F/U: HCPCS | Performed by: NURSE PRACTITIONER

## 2021-03-24 PROCEDURE — 99214 OFFICE O/P EST MOD 30 MIN: CPT | Performed by: NURSE PRACTITIONER

## 2021-03-24 PROCEDURE — G8484 FLU IMMUNIZE NO ADMIN: HCPCS | Performed by: NURSE PRACTITIONER

## 2021-03-24 PROCEDURE — G8427 DOCREV CUR MEDS BY ELIG CLIN: HCPCS | Performed by: NURSE PRACTITIONER

## 2021-03-24 PROCEDURE — 3017F COLORECTAL CA SCREEN DOC REV: CPT | Performed by: NURSE PRACTITIONER

## 2021-03-24 RX ORDER — RIZATRIPTAN BENZOATE 10 MG/1
10 TABLET ORAL
Qty: 12 TABLET | Refills: 5 | Status: SHIPPED | OUTPATIENT
Start: 2021-03-24 | End: 2021-07-09 | Stop reason: SDUPTHER

## 2021-03-24 RX ORDER — AMITRIPTYLINE HYDROCHLORIDE 25 MG/1
75 TABLET, FILM COATED ORAL NIGHTLY
Qty: 90 TABLET | Refills: 5 | Status: SHIPPED | OUTPATIENT
Start: 2021-03-24 | End: 2021-12-07 | Stop reason: SDUPTHER

## 2021-03-24 NOTE — PROGRESS NOTES
[facility]            [address]          [facility], [address] [facility]          Dept: [phone]           Dept Fax: [phone]        MD Marquis Han, MD Ahmed B. Ceil Engman, MD Kay Gitelman, MD Lucila Sexton, MD Johnathan Joseph, CNP            3/24/2021      HISTORY OF PRESENT ILLNESS:       I had the pleasure of seeing Jeff Harris, who returns for continuing neurologic care. The patient was seen last on January 11, 2021 for treatment of chronic migraines. For management of his migraines he is prescribed amitriptyline 50 mg at bedtime daily and aimovig injections 140 mg every 30 days. For abortive therapy he is prescribed fioricet and imitrex. The patient is currently getting 2 headaches/day. The pain does not originate at the occipital grooves. He notes compliance with the aimovig injections and notes that the last injection of aimovig did not provide relief of his migraines. When he gets a headache he is unable to move his head as his neck will stiffen up. He notes that the imitrex has been effective in relieving his headaches, if he takes it within 15 minutes of the headache it will abort the headache. The headaches began occurring once a month and have steadily increased in frequency. The patient did note that the pain is not present constantly. The patient has been compliant with his amitriptyline and notes that when he initially began amitriptyline he had fatigue in morning but after taking it for awhile the fatigue resided. There is no associated tearing of eye or running of the nose. He was placed on a prednisone taper at the beginning of February 2021 and it provided minimal relief. He has tried tylenol and advil to relieve his headache with no success.     Headache location: neck and back of the head  Headache quality: pressure  Associated factors:  nausea, cold sweat  Intensity: 10/10  Headache chronicity: 2 years  Headache frequency: daily, sometimes twice daily  Aggravating factors : laying down, neck pain, bright lights  Relieving factors: Imitrex  Prophylactic medications: Amitriptyline, Aimovig  Abortive medications: Butalbital, Imitrex  Previously used medications: Topamax        Testing reviewed:    MRI Brain WO contrast (2020):   Impression   No acute intracranial abnormality.       Scattered foci of hyperintense signal mildly within the periventricular and   subcortical white matter of both cerebral hemispheres.  These changes likely   relate to either changes related to migraines versus chronic microvascular   disease.                PAST MEDICAL HISTORY:         Diagnosis Date    COVID-    Diverticular disease of colon     History of blood clot to lungs during pregnancy         PAST SURGICAL HISTORY:         Procedure Laterality Date    COLECTOMY  2/1/15        HERNIA REPAIR      JOINT REPLACEMENT      L TKA    REVISION COLOSTOMY      SIGMOIDECTOMY  2/1/15    [facility]/ Dr. Cabrera Prim:     Social History     Socioeconomic History    Marital status:      Spouse name: Not on file    Number of children: Not on file    Years of education: Not on file    Highest education level: Not on file   Occupational History    Not on file   Social Needs    Financial resource strain: Patient refused    Food insecurity     Worry: Patient refused     Inability: Patient refused    Transportation needs     Medical: Patient refused     Non-medical: Patient refused   Tobacco Use    Smoking status: Former Smoker     Packs/day: 1.00     Years: 23.00     Pack years: 23.00     Types: Cigarettes     Quit date: 10/1/2008     Years since quittin.4    Smokeless tobacco: Never Used   Substance and Sexual Activity    Alcohol use: Yes     Comment: few days a week    Drug use: Never    Sexual activity: Not on file   Lifestyle    Physical activity     Days per week: Not on file     Minutes per session: Not on file    Stress: Not on file   Relationships    Social connections     Talks on phone: Not on file     Gets together: Not on file     Attends Christianity service: Not on file     Active member of club or organization: Not on file     Attends meetings of clubs or organizations: Not on file     Relationship status: Not on file    Intimate partner violence     Fear of current or ex partner: Not on file     Emotionally abused: Not on file     Physically abused: Not on file     Forced sexual activity: Not on file   Other Topics Concern    Not on file   Social History Narrative    ** Merged History Encounter **            CURRENT MEDICATIONS:     Current Outpatient Medications   Medication Sig Dispense Refill    amitriptyline (ELAVIL) 25 MG tablet Take 3 tablets by mouth nightly 90 tablet 5    rizatriptan (MAXALT) 10 MG tablet Take 1 tablet by mouth once as needed for Migraine May repeat in 2 hours if needed 12 tablet 5    Misc. Devices MISC Incentive spirometer - use BID 1 Device 0    atorvastatin (LIPITOR) 20 MG tablet TAKE 1 TABLET DAILY 90 tablet 1    Erenumab-aooe (AIMOVIG) 140 MG/ML SOAJ Inject 140 mg into the skin every 30 days 1 mL 5    SUMAtriptan (IMITREX) 100 MG tablet Take 1 tablet by mouth once as needed for Migraine 12 tablet 5    butalbital-acetaminophen-caffeine (FIORICET, ESGIC) -40 MG per tablet Take 1 tablet by mouth 2 times daily as needed for Headaches 60 tablet 3    predniSONE (DELTASONE) 20 MG tablet 3 tabs QD x 3 days then 2 tabs x 3 days then 1 tab QD x 3 days (Patient not taking: Reported on 3/24/2021) 18 tablet 0     No current facility-administered medications for this visit. ALLERGIES:   No Known Allergies                              REVIEW OF SYSTEMS        All items selected indicate a positive finding.     Those items not selected are negative.   Constitutional [] Weight loss/gain   [] Fatigue  [] Fever/Chills   HEENT [] Hearing Loss  [] Visual Disturbance  [] Tinnitus  [] Eye pain   Respiratory [] Shortness of Breath  [] Cough  [] Snoring   Cardiovascular [] Chest Pain  [] Palpitations  [] Lightheaded   GI [] Constipation  [] Diarrhea  [] Swallowing change  [x] Nausea/vomiting    [] Urinary Frequency  [] Urinary Urgency   Musculoskeletal [x] Neck pain  [] Back pain  [] Muscle pain  [] Restless legs   Dermatologic [] Skin changes   Neurologic [] Memory loss/confusion  [] Seizures  [] Trouble walking or imbalance  [] Dizziness  [] Sleep disturbance  [] Weakness  [] Numbness  [] Tremors  [] Speech Difficulty  [x] Headaches  [] Light Sensitivity  [] Sound Sensitivity   Endocrinology []Excessive thirst  []Excessive hunger   Psychiatric [] Anxiety/Depression  [] Hallucination   Allergy/immunology []Hives/environmental allergies   Hematologic/lymph [] Abnormal bleeding  [] Abnormal bruising         PHYSICAL EXAMINATION:       Vitals:    03/24/21 1614   BP: (!) 146/93   Pulse: 83   Temp:                                               .                                                                                                    General Appearance:  Alert, cooperative, no signs of distress, appears stated age   Head:  Normocephalic, no signs of trauma   Eyes:  Conjunctiva/corneas clear;  eyelids intact   Ears:  Normal external ear and canals   Nose: Nares normal, mucosa normal, no drainage    Throat: Lips and tongue normal; teeth normal;  gums normal   Neck: Supple, intact flexion, extension and rotation;   trachea midline;  no adenopathy;   thyroid: not enlarged;   no carotid pulse abnormality   Back:   Symmetric, no curvature, ROM adequate   Lungs:   Respirations unlabored   Heart:  Regular rate and rhythm           Extremities: Extremities normal, no cyanosis, no edema   Pulses: Symmetric over head and neck   Skin: Skin color, texture normal, no rashes, no lesions                                     NEUROLOGIC EXAMINATION    Neurologic Exam  Mental status    Alert and oriented x 3; intact memory with no confusion, speech or language problems; no hallucinations or delusions  Fund of information appropriate for level of education    Cranial nerves    II - visual fields intact to confrontation bilaterally  III, IV, VI - extra-ocular muscles full: no pupillary defect; no MAGGY, no nystagmus, no ptosis   V - normal facial sensation                                                               VII - normal facial symmetry                                                             VIII - intact hearing                                                                             IX, X - symmetrical palate                                                                  XI - symmetrical shoulder shrug                                                       XII - tongue midline without atrophy or fasciculation      Motor function  Normal muscle bulk and tone; strength 5/5 on all 4 extremities, no pronator drift      Sensory function Intact to light touch, pinprick, vibration, proprioception on all 4 extremities      Cerebellar Intact fine motor movement. No involuntary movements or tremors. No ataxia or dysmetria on finger to nose or heel to shin testing      Reflex function DTR 2+ on bilateral UE and LE, symmetric. Negative Babinski      Gait                   normal base and arm swing                  Medical Decision Making: In summary, your patient, Tatum Back exhibits the following, with associated plan:    1. Chronic migraine headaches, currently getting a daily headache. Medication trials include topamax, amitriptyline and aimovig   1. Start maxalt 10 mg for abortive therapy  2. Increase amitriptyline to 75 mg at bed time daily  3. The patient was agreeable to receive botox injections at future appointment. Obtain prior authorization  4.  Continue fioricet for rescue therapy  5. The patient will return for a follow up visit 1 month post botox            Signed: Romel Cota CNP      *Please note that portions of this note were completed with a voice recognition program.  Although every effort was made to insure the accuracy of this automated transcription, some errors in transcription may have occurred, occasionally words and are mis-transcribed    Provider Attestation: The documentation recorded by the scribe accurately reflects the service I personally performed and the decisions made by myself. Portions of this note were transcribed by a scribe. I personally performed the history, physical exam, and medical decision-making and confirm the accuracy of the information in the transcribed note. Scribe Attestation:   By signing my name below, Hemant Rajput, attest that this documentation has been prepared under the direction and in the presence of Romel Cota CNP.

## 2021-03-24 NOTE — TELEPHONE ENCOUNTER
Genoveva Shin called asking about a refill on his Imitrex. That prescription was written 1/11/2021 for 12 tablets with 5 refills. He has used all of the refills. I confirmed this with [facility]. I spoke to Bridget Roy about this. She had a cancellation for today at 4:20 pm. I called Genoveva Shin back and scheduled him to be seen in that opening.

## 2021-03-26 ENCOUNTER — OFFICE VISIT (OUTPATIENT)
Dept: ORTHOPEDIC SURGERY | Age: 61
End: 2021-03-26
Payer: COMMERCIAL

## 2021-03-26 VITALS — RESPIRATION RATE: 12 BRPM | HEIGHT: 74 IN | TEMPERATURE: 97.6 F | BODY MASS INDEX: 30.29 KG/M2 | WEIGHT: 236 LBS

## 2021-03-26 DIAGNOSIS — M19.172 POST-TRAUMATIC ARTHRITIS OF LEFT ANKLE: Primary | ICD-10-CM

## 2021-03-26 DIAGNOSIS — M24.573 EQUINUS CONTRACTURE OF ANKLE: ICD-10-CM

## 2021-03-26 PROCEDURE — 1036F TOBACCO NON-USER: CPT | Performed by: ORTHOPAEDIC SURGERY

## 2021-03-26 PROCEDURE — G8427 DOCREV CUR MEDS BY ELIG CLIN: HCPCS | Performed by: ORTHOPAEDIC SURGERY

## 2021-03-26 PROCEDURE — 99204 OFFICE O/P NEW MOD 45 MIN: CPT | Performed by: ORTHOPAEDIC SURGERY

## 2021-03-26 PROCEDURE — G8417 CALC BMI ABV UP PARAM F/U: HCPCS | Performed by: ORTHOPAEDIC SURGERY

## 2021-03-26 PROCEDURE — G8484 FLU IMMUNIZE NO ADMIN: HCPCS | Performed by: ORTHOPAEDIC SURGERY

## 2021-03-26 PROCEDURE — 3017F COLORECTAL CA SCREEN DOC REV: CPT | Performed by: ORTHOPAEDIC SURGERY

## 2021-03-26 RX ORDER — NAPROXEN 500 MG/1
500 TABLET ORAL 2 TIMES DAILY WITH MEALS
Qty: 30 TABLET | Refills: 1 | Status: SHIPPED | OUTPATIENT
Start: 2021-03-26 | End: 2021-04-09 | Stop reason: SDUPTHER
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
## 2021-03-26 NOTE — LETTER
to prosthetics orthotics to obtain an [place] brace. At today's visit, he was ordered oral NSAIDs as below to be used as needed, and we discussed the appropriate risks. The patient was provided teaching material on this today, and will avoid using multiple NSAIDs at the same time. All questions were answered and the patient agrees with the above plan. The patient will return to clinic in 3 months without x-rays. At his next visit, depending on how he is doing, we may consider a left ankle injection. I look forward to serving you and your patients again in the future. Please don't hesitate to contact me at my mobile number .         Mark Aden MD  Orthopedic Surgery

## 2021-03-29 ENCOUNTER — HOSPITAL ENCOUNTER (OUTPATIENT)
Dept: PHYSICAL THERAPY | Facility: CLINIC | Age: 61
Setting detail: THERAPIES SERIES
Discharge: HOME | End: 2021-03-29
Payer: COMMERCIAL

## 2021-03-29 PROCEDURE — 97110 THERAPEUTIC EXERCISES: CPT

## 2021-03-29 NOTE — FLOWSHEET NOTE
[] [address]  [address]  [phone]  F: [phone] [] [address]  [facility] 36   [address]  P: [phone]  F: [phone] [x] [address]  [address] [facility]  P: [phone]  F: [phone] [] [address]  P: [phone]  F: [phone] [] [address]  [facility]   [address]   [place]: [phone]  F: [phone]      Physical Therapy Daily Treatment Note    Date:  3/29/2021  Patient Name:  Gabriel Godinez    :  1960  MRN: [id number]  Physician: Brii Palomares DO                                               Insurance: [employer]  Medical Diagnosis: Arthritis of left ankle                  Rehab Codes: M19.072  Onset date: 3/10/21                           Next 's appt. : --  Visit# / total visits: 2   Cancels/No Shows: 0/0      Subjective:  Pt reports to therapy with no changes since last treatment. Pt states that he had an appointment with ankle specialist since time of evaluation who states that at this time everything looks okay, however recommended that he use an ankle brace when out doing yard work.   Pain:  [x] Yes  [] No  Location: L ankle  Pain Rating: (0-10 scale) 1/10  Pain altered Tx:  [x] No  [] Yes  Action:  Comments:      Objective:  Modalities:   Precautions: NONE  Exercises:  Exercise  L Ankle Reps/ Time Weight/ Level Comments    Bike 10'      x              Gastroc belt S 3x30\"     x    SB S 3x30\"      x   Gastroc step S 3x30\"   x              Ankle AROM 2x15, 5\"   DF/PF, Inv/Ev x   Ankle circles 2x20     x   Ankle alphabet 2x     x                          BAPS board 2x15    All directions  x                                            Other:        Treatment Charges: Mins Units   []  Modalities     [x]  Ther Exercise 41 3   []  Manual Therapy     []  Ther Activities     []  Aquatics     []  Vasocompression     []  Other     Total Treatment time 41 3       Assessment: [x] Progressing toward goals. Completed tx per log above with pt reporting good tolerance throughout session. Increased reps with all ankle ROM exercises, while adding gastroc step S and SB S to work on flexibility of calf. Also able to add BAPS board to progress stability and control of ankle. Pt with good technique with completion of all exercises. Continue to progress pt as tolerated. [] No change. [] Other:  [x] Patient would continue to benefit from skilled physical therapy services in order to: Progress ankle ROM, strength, and stability to help improve tolerance to all functional activities    STG: (to be met in 6 treatments)  1. ? Pain: Decrease pain levels to 6/10 with all activities to ease tolerance to activity. 2. ? ROM: Increase L ankle DF ROM to neutral to help improve mechanics with all functional mobility, reducing need for compensations with activity. 3. Independent with Home Exercise Programs     LTG: (to be met in 12 treatments)  1. Improve score on LEFS from 32% impaired to <22% impaired to help improve function with L ankle. 2. ? Strength: Increase  L ankle strength to 4/5 grossly to help improve stability in L ankle. 3. ? Function: Pt will demonstrate ability to ambulate without any gait deviations to reduce risk for compensatory techniques with gait. 4. Reduce pain levels to 0/10                    Patient goals: \"More ROM, less pain after physical activity\"    Pt. Education:  [x] Yes  [] No  [x] Reviewed Prior HEP/Ed  Method of Education: [x] Verbal  [x] Demo  [] Written  Comprehension of Education:  [x] Verbalizes understanding. [x] Demonstrates understanding. [x] Needs review.   [] Demonstrates/verbalizes HEP/Ed previously given.     Plan: [x] Continue current frequency toward long and short term goals. [x] Specific Instructions for subsequent treatments: Continue tx per POC      Time In: 0700            Time Out: 1127    Electronically signed by:   Edilma Tenorio PT

## 2021-04-01 ENCOUNTER — HOSPITAL ENCOUNTER (OUTPATIENT)
Dept: PHYSICAL THERAPY | Facility: CLINIC | Age: 61
Setting detail: THERAPIES SERIES
Discharge: HOME | End: 2021-04-01
Payer: COMMERCIAL

## 2021-04-01 PROCEDURE — 97110 THERAPEUTIC EXERCISES: CPT

## 2021-04-01 NOTE — FLOWSHEET NOTE
[] [address]  [address]  [phone]  F: [phone] [] [address]  [facility] 36   [address]  P: [phone]  F: [phone] [x] [address]  [address] [facility] [address] [address]  P: [phone]  F: [phone] [] [address]  P: [phone]  F: [phone] [] [address]  [facility]   [address]   [place]: [phone]  F: [phone]      Physical Therapy Daily Treatment Note    Date:  2021  Patient Name:  Valentine Shane    :  1960  MRN: [id number]  Physician: Jose Hernández DO                                               Insurance: [employer]  Medical Diagnosis: Arthritis of left ankle                  Rehab Codes: M19.072  Onset date: 3/10/21                           Next 's appt. : --  Visit# / total visits: 3/12   Cancels/No Shows: 0/0      Subjective:  Pt presenting to therapy without any new complaints or changes since his previous treatment.   Pain:  [x] Yes  [] No  Location: L ankle  Pain Rating: (0-10 scale) 1/10  Pain altered Tx:  [x] No  [] Yes  Action:  Comments:      Objective:  Modalities:   Precautions: NONE  Exercises:  Exercise  L Ankle Reps/ Time Weight/ Level Comments    Bike 10'      x              Gastroc belt S 3x30\"     x    SB S 3x30\"      x   Gastroc step S 3x30\"   x              Ankle AROM 2x15, 5\"   DF/PF, Inv/Ev x   Ankle circles 2x20     x   Ankle alphabet 3x     x                          BAPS board 2x15    All directions  x              Rebounder 2x20      x               Heel raises          Other:        Treatment Charges: Mins Units   []  Modalities     [x]  Ther Exercise 43 3   []  Manual Therapy     []  Ther Activities     []  Aquatics     [] Vasocompression     []  Other     Total Treatment time 43 3       Assessment: [x] Progressing toward goals. Able to progress pt with addition of rebounder to work on balance with fair tolerance, but pt displays and reports difficulty with several LOB episodes, however denies any pain during completion. Also able to progress reps with ankle alphabet and complete BAPS board standing today with no complaints. Pt reports fatigue in ankle and lower leg following treatment, but still denying any increased pain. [] No change. [] Other:  [x] Patient would continue to benefit from skilled physical therapy services in order to: Progress ankle ROM, strength, and stability to help improve tolerance to all functional activities    STG: (to be met in 6 treatments)  1. ? Pain: Decrease pain levels to 6/10 with all activities to ease tolerance to activity. 2. ? ROM: Increase L ankle DF ROM to neutral to help improve mechanics with all functional mobility, reducing need for compensations with activity. 3. Independent with Home Exercise Programs     LTG: (to be met in 12 treatments)  1. Improve score on LEFS from 32% impaired to <22% impaired to help improve function with L ankle. 2. ? Strength: Increase  L ankle strength to 4/5 grossly to help improve stability in L ankle. 3. ? Function: Pt will demonstrate ability to ambulate without any gait deviations to reduce risk for compensatory techniques with gait. 4. Reduce pain levels to 0/10                    Patient goals: \"More ROM, less pain after physical activity\"    Pt. Education:  [x] Yes  [] No  [x] Reviewed Prior HEP/Ed  Method of Education: [x] Verbal  [x] Demo  [] Written  Comprehension of Education:  [x] Verbalizes understanding. [x] Demonstrates understanding. [x] Needs review. [] Demonstrates/verbalizes HEP/Ed previously given. Plan: [x] Continue current frequency toward long and short term goals.     [x] Specific Instructions for subsequent treatments: Continue tx per POC      Time In: 0700            Time Out: 7312    Electronically signed by:   Alonso Palomares, PT

## 2021-04-05 ENCOUNTER — HOSPITAL ENCOUNTER (OUTPATIENT)
Dept: PHYSICAL THERAPY | Facility: CLINIC | Age: 61
Setting detail: THERAPIES SERIES
Discharge: HOME | End: 2021-04-05
Payer: COMMERCIAL

## 2021-04-05 PROCEDURE — 97110 THERAPEUTIC EXERCISES: CPT

## 2021-04-05 NOTE — FLOWSHEET NOTE
[] [address]  [address]  [phone]  F: [phone] [] [address]  [facility] 36   [address]  P: [phone]  F: [phone] [x] [address]  [address] [facility] [address] [facility] [address]  P: [phone]  F: [phone] [] [address]  P: [phone]  F: [phone] [] [address]  [facility]   [address]   [place]: [phone]  F: [phone]      Physical Therapy Daily Treatment Note    Date:  2021  Patient Name:  Gera Negro    :  1960  MRN: [id number]  Physician: Xuan Rodriguezr,                                                Insurance: [employer]  Medical Diagnosis: Arthritis of left ankle                  Rehab Codes: M19.072  Onset date: 3/10/21                           Next 's appt. : --  Visit# / total visits:    Cancels/No Shows: 0/0      Subjective:  Pt denies any pain into the L ankle this morning but stiffness. States stiffness on the ankle is common in the mornings.    Pain:  [x] Yes  [] No  Location: L ankle  Pain Rating: (0-10 scale) 1/10  Pain altered Tx:  [x] No  [] Yes  Action:  Comments:      Objective:  Modalities:   Precautions: NONE  Exercises:  Exercise  L Ankle Reps/ Time Weight/ Level Comments    Bike 10'      x              Gastroc belt S 3x30\"     x    SB S 3x30\"      x   Gastroc step S 3x30\"   x              Ankle AROM 2x15, 5\"   DF/PF, Inv/Ev x   Ankle circles 2x20     x   Ankle alphabet 3x     x   4 way ankle 20x,10x  lime  PF/DF, Inv/Ev  x              BAPS board 2x15    All directions  x              Rebounder 2x20      x   BOSU lunge 2x10  bilat    x   4 way hip 10x  lime XKX x    Heel raises 2x15     x   Other:        Treatment Charges: Mins Units   [] Needs review. [] Demonstrates/verbalizes HEP/Ed previously given. Plan: [x] Continue current frequency toward long and short term goals.     [x] Specific Instructions for subsequent treatments: Continue tx per POC      Time In: 0700            Time Out: 2221    Electronically signed by:  Debra Hernandez PTA

## 2021-04-07 ENCOUNTER — HOSPITAL ENCOUNTER (OUTPATIENT)
Dept: PHYSICAL THERAPY | Facility: CLINIC | Age: 61
Setting detail: THERAPIES SERIES
Discharge: HOME | End: 2021-04-07
Payer: COMMERCIAL

## 2021-04-07 PROCEDURE — 97110 THERAPEUTIC EXERCISES: CPT

## 2021-04-07 NOTE — FLOWSHEET NOTE
[] [address]  [address]  [phone]  F: [phone] [] [address]  [facility]   [address]  P: [phone]  F: [phone] [x] [place]  [address] [facility] [address] [address]  P: [phone]  F: [phone] [] [address]  P: [phone]  F: [phone] [] [address]  [facility]   [address]   [place]: [phone]  F: [phone]      Physical Therapy Daily Treatment Note    Date:  2021  Patient Name:  Purnima Mccoy    :  1960  MRN: [id number]  Physician: Silvia Carroll DO                                               Insurance: [employer]  Medical Diagnosis: Arthritis of left ankle                  Rehab Codes: M19.072  Onset date: 3/10/21                           Next 's appt. : --  Visit# / total visits:    Cancels/No Shows: 0/0      Subjective:  Very minor pain/soreness into the L ankle this morning. More of a complaint of ankle stiffness this morning and last night as well but was doing yard work and cutting the grass. No increased pain or soreness following his previous visit.     Pain:  [x] Yes  [] No  Location: L ankle  Pain Rating: (0-10 scale) 1/10  Pain altered Tx:  [x] No  [] Yes  Action:  Comments:      Objective:  Modalities:   Precautions: NONE  Exercises:  Exercise  L Ankle Reps/ Time Weight/ Level Comments    Bike 10'      x              Gastroc belt S 3x30\"     x    SB S 3x30\"      x   Gastroc step S 3x30\"   x              Ankle AROM 2x15, 5\"   DF/PF, Inv/Ev x   Ankle circles 2x20     x   Ankle alphabet 3x     x   4 way ankle 25x,15x  lime  PF/DF, Inv/Ev  x              BAPS board 2x15    All directions  x              Rebounder 2x20      x   BOSU lunge 2x10  bilat    x   BOSU step ups 20x LLE  x   4 way hip 15x  lime XKX x    Heel raises 2x15     x   Mat squats 2x10   x   Other:        Treatment Charges: Mins Units   []  Modalities     [x]  Ther Exercise 47 3   []  Manual Therapy     []  Ther Activities     []  Aquatics     []  Vasocompression     []  Other     Total Treatment time 47 3       Assessment: [x] Progressing toward goals. Good tolerance to bike warm up and stretches to address L ankle ROM. Resumed rebounder this date with poor SLS balance demonstrated. Increased reps of 4 way hip CKC to further challenge stability along with addition of LLE BOSU step ups. Pt able to complete all progressions this date without reports of pain symptoms following treatment. Will continue to progress pt as elvi. [] No change. [] Other:  [x] Patient would continue to benefit from skilled physical therapy services in order to: Progress ankle ROM, strength, and stability to help improve tolerance to all functional activities    STG: (to be met in 6 treatments)  1. ? Pain: Decrease pain levels to 6/10 with all activities to ease tolerance to activity. 2. ? ROM: Increase L ankle DF ROM to neutral to help improve mechanics with all functional mobility, reducing need for compensations with activity. 3. Independent with Home Exercise Programs     LTG: (to be met in 12 treatments)  1. Improve score on LEFS from 32% impaired to <22% impaired to help improve function with L ankle. 2. ? Strength: Increase  L ankle strength to 4/5 grossly to help improve stability in L ankle. 3. ? Function: Pt will demonstrate ability to ambulate without any gait deviations to reduce risk for compensatory techniques with gait. 4. Reduce pain levels to 0/10                    Patient goals: \"More ROM, less pain after physical activity\"    Pt.  Education:  [x] Yes  [] No  [x] Reviewed Prior HEP/Ed  Method of Education: [x] Verbal  [x] Demo  [] Written  Comprehension of Education:  [x] Verbalizes understanding. [x] Demonstrates understanding. [x] Needs review. [] Demonstrates/verbalizes HEP/Ed previously given. Plan: [x] Continue current frequency toward long and short term goals.     [x] Specific Instructions for subsequent treatments: Continue tx per POC      Time In: 0700            Time Out: 0835    Electronically signed by:  Sukhdev Horne PTA

## 2021-04-08 ENCOUNTER — TELEPHONE (OUTPATIENT)
Dept: NEUROLOGY | Age: 61
End: 2021-04-08

## 2021-04-08 ENCOUNTER — OFFICE VISIT (OUTPATIENT)
Dept: NEUROLOGY | Age: 61
End: 2021-04-08
Payer: COMMERCIAL

## 2021-04-08 VITALS
WEIGHT: 238 LBS | HEIGHT: 74 IN | SYSTOLIC BLOOD PRESSURE: 152 MMHG | BODY MASS INDEX: 30.54 KG/M2 | DIASTOLIC BLOOD PRESSURE: 100 MMHG | HEART RATE: 73 BPM

## 2021-04-08 DIAGNOSIS — G43.719 INTRACTABLE CHRONIC MIGRAINE WITHOUT AURA AND WITHOUT STATUS MIGRAINOSUS: Primary | ICD-10-CM

## 2021-04-08 PROCEDURE — G8427 DOCREV CUR MEDS BY ELIG CLIN: HCPCS | Performed by: NURSE PRACTITIONER

## 2021-04-08 PROCEDURE — 1036F TOBACCO NON-USER: CPT | Performed by: NURSE PRACTITIONER

## 2021-04-08 PROCEDURE — 99213 OFFICE O/P EST LOW 20 MIN: CPT | Performed by: NURSE PRACTITIONER

## 2021-04-08 PROCEDURE — 3017F COLORECTAL CA SCREEN DOC REV: CPT | Performed by: NURSE PRACTITIONER

## 2021-04-08 PROCEDURE — G8417 CALC BMI ABV UP PARAM F/U: HCPCS | Performed by: NURSE PRACTITIONER

## 2021-04-08 NOTE — PROGRESS NOTES
[facility]            [address]          [place], [address] [facility]          Dept: [phone]          Dept Fax: [phone]        MD Mich Napier MD Ahmed B. Barnett Head, MD Yvette Patricio MD                Demetrius Officer, CNP            4/8/2021      HISTORY OF PRESENT ILLNESS:       I had the pleasure of seeing Darreld Theresa, who returns for continuing neurologic care. The patient was seen last on March 24, 2021 for treatment of chronic migraine headaches. At his last visit, he was getting daily headaches. He was started on Maxalt 10 mg for abortive therapy and his Amitriptyline was increased to 75 mg at bedtime. He also uses Fioricet for rescue therapy. The patient agreed to start Botox and a prior authorization was started. Today, the patient reports that the his migraines continue to be daily until the last 2 days. . Over the past three weeks, his headaches were not as intense as they were previously. When he does get a migraine, he will  Maxalt and that will provide enough relief for him. He has one bad migraine that he could not get rid of on Easter Sunday. Headache location: neck and back of the head  Headache quality: pressure  Associated factors:  nausea, cold sweat  Intensity: 10/10  Headache chronicity: 2 years  Headache frequency: daily  Aggravating factors : laying down, neck pain, bright lights  Relieving factors: Imitrex  Prophylactic medications: Amitriptyline, Aimovig  Abortive medications: Butalbital, Maxalt  Previously used medications: Topamax, Fioricet    Testing reviewed:  MRI Brain WO contrast (12/24/2020):   Impression   No acute intracranial abnormality.       Scattered foci of hyperintense signal mildly within the periventricular and   subcortical white matter of both cerebral hemispheres.  These changes likely   relate to either changes related to migraines versus chronic microvascular   disease.                 PAST MEDICAL HISTORY:         Diagnosis Date    COVID-19     2020    Diverticular disease of colon     History of blood clot to lungs during pregnancy         PAST SURGICAL HISTORY:         Procedure Laterality Date    COLECTOMY  2/1/15        HERNIA REPAIR  's    JOINT REPLACEMENT  2009    L TKA    REVISION COLOSTOMY      SIGMOIDECTOMY  2/1/15    [facility]/ Dr. Renetta Flannery HISTORY:     Social History     Socioeconomic History    Marital status:      Spouse name: Not on file    Number of children: Not on file    Years of education: Not on file    Highest education level: Not on file   Occupational History    Not on file   Social Needs    Financial resource strain: Patient refused    Food insecurity     Worry: Patient refused     Inability: Patient refused    Transportation needs     Medical: Patient refused     Non-medical: Patient refused   Tobacco Use    Smoking status: Former Smoker     Packs/day: 1.00     Years: 23.00     Pack years: 23.00     Types: Cigarettes     Quit date: 10/1/2008     Years since quittin.5    Smokeless tobacco: Never Used   Substance and Sexual Activity    Alcohol use: Yes     Comment: few days a week    Drug use: Never    Sexual activity: Not on file   Lifestyle    Physical activity     Days per week: Not on file     Minutes per session: Not on file    Stress: Not on file   Relationships    Social connections     Talks on phone: Not on file     Gets together: Not on file     Attends Taoism service: Not on file     Active member of club or organization: Not on file     Attends meetings of clubs or organizations: Not on file     Relationship status: Not on file    Intimate partner violence     Fear of current or ex partner: Not on file     Emotionally abused: Not on file     Physically abused: Not on file     Forced sexual activity: Not on file   Other Topics Concern    Not on file   Social History Narrative    ** Merged History Encounter **            CURRENT MEDICATIONS:     Current Outpatient Medications   Medication Sig Dispense Refill    naproxen (NAPROSYN) 500 MG tablet Take 1 tablet by mouth 2 times daily (with meals) Take with food. 30 tablet 1    amitriptyline (ELAVIL) 25 MG tablet Take 3 tablets by mouth nightly 90 tablet 5    rizatriptan (MAXALT) 10 MG tablet Take 1 tablet by mouth once as needed for Migraine May repeat in 2 hours if needed 12 tablet 5    atorvastatin (LIPITOR) 20 MG tablet TAKE 1 TABLET DAILY 90 tablet 1    Erenumab-aooe (AIMOVIG) 140 MG/ML SOAJ Inject 140 mg into the skin every 30 days 1 mL 5    butalbital-acetaminophen-caffeine (FIORICET, ESGIC) -40 MG per tablet Take 1 tablet by mouth 2 times daily as needed for Headaches 60 tablet 3    Misc. Devices MISC Incentive spirometer - use BID (Patient not taking: Reported on 4/8/2021) 1 Device 0    SUMAtriptan (IMITREX) 100 MG tablet Take 1 tablet by mouth once as needed for Migraine 12 tablet 5     No current facility-administered medications for this visit. ALLERGIES:   No Known Allergies                              REVIEW OF SYSTEMS        All items selected indicate a positive finding. Those items not selected are negative.   Constitutional [] Weight loss/gain   [] Fatigue  [] Fever/Chills   HEENT [] Hearing Loss  [] Visual Disturbance  [] Tinnitus  [] Eye pain   Respiratory [] Shortness of Breath  [] Cough  [] Snoring   Cardiovascular [] Chest Pain  [] Palpitations  [] Lightheaded   GI [] Constipation  [] Diarrhea  [] Swallowing change  [x] Nausea/vomiting    [] Urinary Frequency  [] Urinary Urgency   Musculoskeletal [] Neck pain  [] Back pain  [] Muscle pain  [] Restless legs   Dermatologic [] Skin changes   Neurologic [] Memory loss/confusion  [] Seizures  [] Trouble walking or imbalance  [] Dizziness  [] Sleep disturbance  [] Weakness  [] Numbness  [] Tremors  [] Speech Difficulty  [x] Headaches  [x] Light Sensitivity  [x] Sound Sensitivity   Endocrinology []Excessive thirst  []Excessive hunger   Psychiatric [] Anxiety/Depression  [] Hallucination   Allergy/immunology []Hives/environmental allergies   Hematologic/lymph [] Abnormal bleeding  [] Abnormal bruising         PHYSICAL EXAMINATION:       Vitals:    04/08/21 0803   BP: (!) 159/98   Pulse: 79                                              .                                                                                                    General Appearance:  Alert, cooperative, no signs of distress, appears stated age   Head:  Normocephalic, no signs of trauma   Eyes:  Conjunctiva/corneas clear;  eyelids intact   Ears:  Normal external ear and canals   Nose: Nares normal, mucosa normal, no drainage    Throat: Lips and tongue normal; teeth normal;  gums normal   Neck: Supple, intact flexion, extension and rotation;   trachea midline;  no adenopathy;   thyroid: not enlarged;   no carotid pulse abnormality   Back:   Symmetric, no curvature, ROM adequate   Lungs:   Respirations unlabored   Heart:  Regular rate and rhythm           Extremities: Extremities normal, no cyanosis, no edema   Pulses: Symmetric over head and neck   Skin: Skin color, texture normal, no rashes, no lesions                                     NEUROLOGIC EXAMINATION    Neurologic Exam  Mental status    Alert and oriented x 3; intact memory with no confusion, speech or language problems; no hallucinations or delusions  Fund of information appropriate for level of education    Cranial nerves    II - visual fields intact to confrontation bilaterally  III, IV, VI - extra-ocular muscles full: no pupillary defect; no MAGGY, no nystagmus, no ptosis   V - normal facial sensation                                                               VII - normal facial symmetry note were transcribed by a scribe. I personally performed the history, physical exam, and medical decision-making and confirm the accuracy of the information in the transcribed note. Scribe Attestation:   By signing my name below, Patti Savage, attest that this documentation has been prepared under the direction and in the presence of Phineas Bernheim, CNP.

## 2021-04-08 NOTE — TELEPHONE ENCOUNTER
Patient needs [address] prior auth for Botox. Per Marla Song if we get it approved soon OK to add patient in at lunch time on 04 19 2021.   KS

## 2021-04-09 ENCOUNTER — OFFICE VISIT (OUTPATIENT)
Dept: FAMILY MEDICINE CLINIC | Age: 61
End: 2021-04-09
Payer: COMMERCIAL

## 2021-04-09 VITALS
BODY MASS INDEX: 30.56 KG/M2 | TEMPERATURE: 97.3 F | OXYGEN SATURATION: 98 % | HEART RATE: 100 BPM | DIASTOLIC BLOOD PRESSURE: 92 MMHG | WEIGHT: 238 LBS | SYSTOLIC BLOOD PRESSURE: 142 MMHG

## 2021-04-09 DIAGNOSIS — S39.92XD INJURY OF LOW BACK, SUBSEQUENT ENCOUNTER: Primary | ICD-10-CM

## 2021-04-09 PROCEDURE — G8427 DOCREV CUR MEDS BY ELIG CLIN: HCPCS | Performed by: NURSE PRACTITIONER

## 2021-04-09 PROCEDURE — 96372 THER/PROPH/DIAG INJ SC/IM: CPT | Performed by: NURSE PRACTITIONER

## 2021-04-09 PROCEDURE — 1036F TOBACCO NON-USER: CPT | Performed by: NURSE PRACTITIONER

## 2021-04-09 PROCEDURE — 3017F COLORECTAL CA SCREEN DOC REV: CPT | Performed by: NURSE PRACTITIONER

## 2021-04-09 PROCEDURE — 99214 OFFICE O/P EST MOD 30 MIN: CPT | Performed by: NURSE PRACTITIONER

## 2021-04-09 PROCEDURE — G8417 CALC BMI ABV UP PARAM F/U: HCPCS | Performed by: NURSE PRACTITIONER

## 2021-04-09 RX ORDER — KETOROLAC TROMETHAMINE 30 MG/ML
60 INJECTION, SOLUTION INTRAMUSCULAR; INTRAVENOUS ONCE
Status: COMPLETED | OUTPATIENT
Start: 2021-04-09 | End: 2021-04-09

## 2021-04-09 RX ORDER — NAPROXEN 500 MG/1
500 TABLET ORAL 2 TIMES DAILY WITH MEALS
Qty: 30 TABLET | Refills: 0 | Status: SHIPPED | OUTPATIENT
Start: 2021-04-09 | End: 2022-01-13

## 2021-04-09 RX ORDER — KETOROLAC TROMETHAMINE 30 MG/ML
60 INJECTION, SOLUTION INTRAMUSCULAR; INTRAVENOUS ONCE
Qty: 2 ML | Refills: 0
Start: 2021-04-09 | End: 2021-04-09 | Stop reason: CLARIF

## 2021-04-09 RX ORDER — TIZANIDINE 4 MG/1
4 TABLET ORAL 3 TIMES DAILY PRN
Qty: 15 TABLET | Refills: 0 | Status: SHIPPED | OUTPATIENT
Start: 2021-04-09 | End: 2021-04-14

## 2021-04-09 RX ADMIN — KETOROLAC TROMETHAMINE 60 MG: 30 INJECTION, SOLUTION INTRAMUSCULAR; INTRAVENOUS at 16:24

## 2021-04-09 NOTE — PROGRESS NOTES
02/17/2021 Not Detected   Final    NORHYDROCODONE, URINE 02/17/2021 Not Detected   Final    Noroxycodone, Urine 02/17/2021 Not Detected   Final    NOROXYMORPHONE, URINE 02/17/2021 Not Detected   Final    Oxazepam, Urine 02/17/2021 Not Detected   Final    Oxycodone Urine 02/17/2021 Not Detected   Final    Oxymorphone, Urine 02/17/2021 Not Detected   Final    PCP, Urine 02/17/2021 Not Detected   Final    Phentermine, Ur 02/17/2021 Not Detected   Final    Propoxyphene, Urine 02/17/2021 NOT REPORTED   Final    Tapentadol-O-Sulfate, Urine 02/17/2021 Not Detected   Final    Tapentadol, Urine 02/17/2021 Not Detected   Final    Temazepam, Urine 02/17/2021 Not Detected   Final    Tramadol, Urine 02/17/2021 Not Detected   Final    Zolpidem, Urine 02/17/2021 Not Detected   Final    Drugs Expected, Ur 02/17/2021 INFORMATION NOT PROVIDED   Final    Creatinine, Ur 02/17/2021 75.0  20.0 - 400.0 mg/dL Final    Pain Mgt Drug Panel, Hi Res, Ur 02/17/2021 See Below   Final    Comment: (NOTE)  Methodology: Qualitative Enzyme Immunoassay and Qualitative Liquid   Chromatography-Tandem Mass Spectrometry, Quantitative   Spectrophotometry  The absence of expected drug(s) and/or drug metabolite(s) may   indicate non-compliance, inappropriate timing of specimen   collection relative to drug administration, poor drug absorption,   diluted/adulterated urine, or limitations of testing. The   concentration must be greater than or equal to the cutoff to be   reported as present. If specific drug concentrations are   required, contact the laboratory within two weeks of specimen   collection to request quantification by a second analytical   technique. Interpretive questions should be directed to the   laboratory. Results based on immunoassay detection that do not match clinical   expectations should be  interpreted with caution.  Confirmatory testing by mass   spectrometry for immunoassay-based results is available, if   ordered within two weeks of specimen collection. Additiona                           l   charges apply. For medical purposes only; not valid for forensic use. This test was developed and its performance characteristics   determined by [facility]. It has not been cleared or   approved by the [employer] and Drug Administration. This test was   performed in a CLIA certified laboratory and is intended for   clinical purposes.  EER Hi Res Interp Ur 02/17/2021 See Note   Final    Comment: (NOTE)  Access [facility] Enhanced Report using the link below:  -Direct access: [email or web address]  Performed By: [facility] [place] [facility]  [place], [address] [facility]  : Sara Flores MD      Naloxone Urine 02/17/2021 Not Detected   Final    Gabapentin 02/17/2021 Not Detected   Final    Pregabalin 02/17/2021 Not Detected   Final    Alpha-OH-Midazolam, Urine 02/17/2021 Not Detected   Final    Zolpidem Metabolite (ZCA), Urine 02/17/2021 Not Detected   Final     No results found for this visit on 04/09/21. Completed Orders/Prescriptions   Orders Placed This Encounter   Medications    tiZANidine (ZANAFLEX) 4 MG tablet     Sig: Take 1 tablet by mouth 3 times daily as needed (muscle pain)     Dispense:  15 tablet     Refill:  0    naproxen (NAPROSYN) 500 MG tablet     Sig: Take 1 tablet by mouth 2 times daily (with meals) Take with food. Dispense:  30 tablet     Refill:  0    ketorolac (TORADOL) 60 MG/2ML injection     Sig: Inject 2 mLs into the muscle once for 1 dose     Dispense:  2 mL     Refill:  0               AssessmentPlan/Medical Decision Making     1. Injury of low back, subsequent encounter    - Advised to apply heat to the affected area TID for 10-15 mins  - XR LUMBAR SPINE (2-3 VIEWS); Future  - tiZANidine (ZANAFLEX) 4 MG tablet; Take 1 tablet by mouth 3 times daily as needed (muscle pain)  Dispense: 15 tablet;  Refill: 0  - naproxen (NAPROSYN) 500 MG tablet; Take 1 tablet by mouth 2 times daily (with meals) Take with food. Dispense: 30 tablet; Refill: 0  - ketorolac (TORADOL) 60 MG/2ML injection; Inject 2 mLs into the muscle once for 1 dose  Dispense: 2 mL; Refill: 0      Return in about 1 week (around 4/16/2021) for follow up back pain. 1.  Jeremiah Posadas received counseling on the following healthy behaviors: n/a  2. Patient given educational materials - see patient instructions  3. Was a self-tracking handout given in paper form or via [employer]? No  If yes, see orders or list here. 4.  Discussed use, benefit, and side effects of prescribed medications. Barriers to medication compliance addressed. All patient questions answered. Pt voiced understanding. 5.  Reviewed prior labs, imaging, consultation, follow up, and health maintenance  6. Continue current medications, diet and exercise. 7. Discussed use, benefit, and side effects of prescribed medications. Barriers to medication compliance addressed. All her questions were answered. Pt voiced understanding. Jeremiah Posadas will continue current medications, diet and exercise. Patient given educational materials on back stretches    Of the 30 minute duration appointment visit, Vee Delcid CNP spent at least 50% of the face-to-face time in counseling, explanation of diagnosis, planning of further management, and answering all questions.           Signed:  Vee Delcid CNP

## 2021-04-09 NOTE — PATIENT INSTRUCTIONS
on the \"Sign Up Now\" link. Current as of: November 16, 2020               Content Version: 12.8  © 2006-2021 [employer]wise[employer]. Care instructions adapted under license by [facility] ([facility]). If you have questions about a medical condition or this instruction, always ask your healthcare professional. [place] [address] any warranty or liability for your use of this information.

## 2021-04-12 ENCOUNTER — HOSPITAL ENCOUNTER (OUTPATIENT)
Dept: PHYSICAL THERAPY | Facility: CLINIC | Age: 61
Setting detail: THERAPIES SERIES
Discharge: HOME | End: 2021-04-12
Payer: COMMERCIAL

## 2021-04-12 PROCEDURE — 97110 THERAPEUTIC EXERCISES: CPT

## 2021-04-12 NOTE — FLOWSHEET NOTE
[] [address]  [address]  [phone]  F: [phone] [] [address]  [place] [address]   [address]  P: [phone]  F: [phone] [x] [place]  [address]  P: [phone]  F: [phone] [] [address]  P: [phone]  F: [phone] [] [address]  [facility]   [address]   [place]: [phone]  F: [phone]      Physical Therapy Daily Treatment Note    Date:  2021  Patient Name:  Corby [place]    :  1960  MRN: [id number]  Physician: Eloisa Wyatt DO                                               Insurance: [employer]  Medical Diagnosis: Arthritis of left ankle                  Rehab Codes: M19.072  Onset date: 3/10/21                           Next 's appt. : --  Visit# / total visits:    Cancels/No Shows: 0/0      Subjective:  Pt denying increased pain since his previous visit. Overall he feels his L ankle is doing well.      Pain:  [x] Yes  [] No  Location: L ankle  Pain Rating: (0-10 scale) 1/10  Pain altered Tx:  [x] No  [] Yes  Action:  Comments:      Objective:  Modalities:   Precautions: NONE  Exercises:  Exercise  L Ankle Reps/ Time Weight/ Level Comments    Bike 10'      x              Gastroc belt S 3x30\"     x    SB S 3x30\"      x   Gastroc step S 3x30\"   x              Ankle AROM 2x15, 5\"   DF/PF, Inv/Ev    Ankle circles 2x20     x   Ankle alphabet 3x     x   4 way ankle 25x,15x  lime  PF/DF, Inv/Ev  x              BAPS board 2x15    All directions  x              Rebounder 2x20      x   BOSU lunge 2x10  bilat    x   BOSU step ups 20x LLE  x   4 way hip 15x  lime CKC x    Heel raises 2x15     x   Mat squats 2x10   x   Other:        Treatment Charges: Mins Units   []  Modalities     [x]  Ther Exercise 45 3   []  Manual Therapy     []  Ther Activities     []  Aquatics     []  Vasocompression     []  Other     Total Treatment time 45 3       Assessment: [x] Progressing toward goals. Pt with good recall of stretches completed this date. Rebounder continues to be challenge d/t SLS deficits. Pt mentions increased pain with eversion/inversion completing BAPS along with increased soreness completing tband resisted eversion/inversion. Provided verbal cueing this date to ensure proper squat technique and BOSU lunges completed properly as well, good follow through. Pt notes increased L ankle soreness post ex but overall tolerated all ex well. [] No change. [] Other:  [x] Patient would continue to benefit from skilled physical therapy services in order to: Progress ankle ROM, strength, and stability to help improve tolerance to all functional activities    STG: (to be met in 6 treatments)  1. ? Pain: Decrease pain levels to 6/10 with all activities to ease tolerance to activity. 2. ? ROM: Increase L ankle DF ROM to neutral to help improve mechanics with all functional mobility, reducing need for compensations with activity. 3. Independent with Home Exercise Programs     LTG: (to be met in 12 treatments)  1. Improve score on LEFS from 32% impaired to <22% impaired to help improve function with L ankle. 2. ? Strength: Increase  L ankle strength to 4/5 grossly to help improve stability in L ankle. 3. ? Function: Pt will demonstrate ability to ambulate without any gait deviations to reduce risk for compensatory techniques with gait. 4. Reduce pain levels to 0/10                    Patient goals: \"More ROM, less pain after physical activity\"    Pt. Education:  [x] Yes  [] No  [x] Reviewed Prior HEP/Ed  Method of Education: [x] Verbal  [x] Demo  [] Written  Comprehension of Education:  [x] Verbalizes understanding. [x] Demonstrates understanding.   [x] Needs review. [] Demonstrates/verbalizes HEP/Ed previously given. Plan: [x] Continue current frequency toward long and short term goals.     [x] Specific Instructions for subsequent treatments: Continue tx per POC      Time In: 2857            Time Out: 3050    Electronically signed by:  Sukhdev Horne PTA

## 2021-04-16 ENCOUNTER — HOSPITAL ENCOUNTER (OUTPATIENT)
Dept: PHYSICAL THERAPY | Facility: CLINIC | Age: 61
Setting detail: THERAPIES SERIES
Discharge: HOME | End: 2021-04-16
Payer: COMMERCIAL

## 2021-04-16 PROCEDURE — 97110 THERAPEUTIC EXERCISES: CPT

## 2021-04-16 NOTE — PROGRESS NOTES
Other:        Treatment Charges: Mins Units   []  Modalities     [x]  Ther Exercise 43 3   []  Manual Therapy     []  Ther Activities     []  Aquatics     []  Vasocompression     []  Other     Total Treatment time 43 3       Assessment: [x] Progressing toward goals. Able to progress pt today with increased reps with rebounder, BAPS board, and heel raises, while also adding SL stance on foam. Pt still denies pain following treatment, however does report fatigue. Pt with good technique and recall of exercises today, however is still struggling with some of the higher level balance exercises. Since starting therapy, pt is reporting that he has noticed that walking is easier and less painful, and also feels that he is able to walk longer distances without symptoms. At this time, plan to continue with current POC with plan to keep progressing higher level balance and strength. [] No change. [] Other:  [x] Patient would continue to benefit from skilled physical therapy services in order to: Progress ankle ROM, strength, and stability to help improve tolerance to all functional activities    STG: (to be met in 6 treatments)  1. ? Pain: Decrease pain levels to 6/10 with all activities to ease tolerance to activity.: ONGOING, still up to 7-8/10 (4/16/21)  2. ? ROM: Increase L ankle DF ROM to neutral to help improve mechanics with all functional mobility, reducing need for compensations with activity.: MET, pt with 2* DF (4/16/21)  3. Independent with Home Exercise Programs: MET (4/16/21)     LTG: (to be met in 12 treatments)  1. Improve score on LEFS from 32% impaired to <22% impaired to help improve function with L ankle. 2. ? Strength: Increase  L ankle strength to 4/5 grossly to help improve stability in L ankle. 3. ? Function: Pt will demonstrate ability to ambulate without any gait deviations to reduce risk for compensatory techniques with gait.   4. Reduce pain levels to 0/10                    Patient goals: \"More ROM, less pain after physical activity\"    Pt. Education:  [x] Yes  [] No  [] Reviewed Prior HEP/Ed  Method of Education: [x] Verbal  [x] Demo  [] Written  Comprehension of Education:  [x] Verbalizes understanding. [x] Demonstrates understanding. [x] Needs review. [] Demonstrates/verbalizes HEP/Ed previously given. Plan: [x] Continue current frequency toward long and short term goals. [x] Specific Instructions for subsequent treatments: Continue tx per POC      Time In: 0730            Time Out: 1906    Electronically signed by:   Obdulia Ritchie PT

## 2021-04-19 ENCOUNTER — TELEPHONE (OUTPATIENT)
Dept: NEUROLOGY | Age: 61
End: 2021-04-19

## 2021-04-19 ENCOUNTER — PROCEDURE VISIT (OUTPATIENT)
Dept: NEUROLOGY | Age: 61
End: 2021-04-19
Payer: COMMERCIAL

## 2021-04-19 DIAGNOSIS — G43.719 INTRACTABLE CHRONIC MIGRAINE WITHOUT AURA AND WITHOUT STATUS MIGRAINOSUS: Primary | ICD-10-CM

## 2021-04-19 PROCEDURE — 64615 CHEMODENERV MUSC MIGRAINE: CPT | Performed by: NURSE PRACTITIONER

## 2021-04-19 NOTE — PROGRESS NOTES
[facility]  Offices: [address], [facility], [address] [facility]  [address], [address], [place], [address] [place], [place] [address], [facility], [facility] [address]  Phone: [phone]  Fax: [phone]     MD Rainer Gamino, MD Antelmo Anthony, MD Elisabeth Taylor, MD Maria M Caldera, MD Marion Younger, CNP        Procedure: Chemodenervation CPT Code 09547  Indication for treatment: Chronic migraine (ICD 10 P96.604)  Consent form was signed. Potential risks and benefits for the procedure were explained to the patient. Procedure: 30-gauge half inch needle was used and 200 U vial Botox was prepared with 4ml 0.9% NS.155 units of Botox were used and 45 units of Botox were discarded. Botox was injected at 31 different sites as follows:   Order  Muscle  Units injected    A  Corrugator1  10 units at 2 div sites    B  Procerus  5 Units in 1 site    C  Frontalis¹  20 Units div. 4 sites    D  Temporalis¹  40 Units div 8 site    E  Occipitalis¹  30 Units div. 6 sites    F  Cervical paraspinal muscles¹  20 Units div 4 sites    G  Trapezius¹  30 Units div 6 sites    Total Dose  155 Units div 31 sites    2 Dose distributed bilaterally     Blood loss: Less than 1 cc     Complications: none     Disposition: Post-botox instructions were reviewed and provided to the patient. Patient was advised to seek medical care for any generalized muscle weakness, double vision, ptosis, trouble swallowing, difficulty talking or difficulty breathing. The patient will return in 3 months for subsequent botox treatments.

## 2021-04-20 ENCOUNTER — HOSPITAL ENCOUNTER (OUTPATIENT)
Dept: PHYSICAL THERAPY | Facility: CLINIC | Age: 61
Setting detail: THERAPIES SERIES
Discharge: HOME | End: 2021-04-20
Payer: COMMERCIAL

## 2021-04-20 PROCEDURE — 97110 THERAPEUTIC EXERCISES: CPT

## 2021-04-20 NOTE — PROGRESS NOTES
1x15   x          Other:        Treatment Charges: Mins Units   []  Modalities     [x]  Ther Exercise 32 2   []  Manual Therapy     []  Ther Activities     []  Aquatics     []  Vasocompression     []  Other     Total Treatment time 32 2       Assessment: [x] Progressing toward goals. Pt warmed up on bike today with good tolerance, pt denies any increase in pain with warm up. Added eccentric calf raises this date to progress exercises and improve eccentric ankle control. Pt mentions circles and side to side on BAPS board to be the most challenging directions. Pt had difficulty with BOSU lunges due to decreased ankle stability and strength. Gave pt VCs to maintain control throughout movement. Pt attempted SLS on green foam with no UE support but needed to use UE support to maintain balance after a few seconds. Pt only able to perform SLS with rebounder for 2-4reps beofre experience a loss of balance due to decreased ankle stability. Continue to progress as tolerated. [] No change. [] Other:  [x] Patient would continue to benefit from skilled physical therapy services in order to: Progress ankle ROM, strength, and stability to help improve tolerance to all functional activities    STG: (to be met in 6 treatments)  1. ? Pain: Decrease pain levels to 6/10 with all activities to ease tolerance to activity.: ONGOING, still up to 7-8/10 (4/16/21)  2. ? ROM: Increase L ankle DF ROM to neutral to help improve mechanics with all functional mobility, reducing need for compensations with activity.: MET, pt with 2* DF (4/16/21)  3. Independent with Home Exercise Programs: MET (4/16/21)     LTG: (to be met in 12 treatments)  1. Improve score on LEFS from 32% impaired to <22% impaired to help improve function with L ankle. 2. ? Strength: Increase  L ankle strength to 4/5 grossly to help improve stability in L ankle.    3. ? Function: Pt will demonstrate ability to ambulate without any gait deviations to reduce risk for compensatory techniques with gait. 4. Reduce pain levels to 0/10                    Patient goals: \"More ROM, less pain after physical activity\"    Pt. Education:  [x] Yes  [] No  [] Reviewed Prior HEP/Ed  Method of Education: [x] Verbal  [x] Demo  [] Written  Comprehension of Education:  [x] Verbalizes understanding. [x] Demonstrates understanding. [x] Needs review. [] Demonstrates/verbalizes HEP/Ed previously given. Plan: [x] Continue current frequency toward long and short term goals. [x] Specific Instructions for subsequent treatments: Continue tx per POC      Time In: 2:01pm            Time Out: 2:37pm    Electronically signed by:  HUNG Mcfarlane  Patient treated and note written by Maldonado Hernandez, Student Physical Therapist Assistant under supervision of Chica Araujo PTA.

## 2021-04-23 ENCOUNTER — APPOINTMENT (OUTPATIENT)
Dept: PHYSICAL THERAPY | Facility: CLINIC | Age: 61
End: 2021-04-23
Payer: COMMERCIAL

## 2021-04-26 ENCOUNTER — APPOINTMENT (OUTPATIENT)
Dept: PHYSICAL THERAPY | Facility: CLINIC | Age: 61
End: 2021-04-26
Payer: COMMERCIAL

## 2021-04-28 ENCOUNTER — APPOINTMENT (OUTPATIENT)
Dept: PHYSICAL THERAPY | Facility: CLINIC | Age: 61
End: 2021-04-28
Payer: COMMERCIAL

## 2021-04-30 ENCOUNTER — HOSPITAL ENCOUNTER (OUTPATIENT)
Dept: PHYSICAL THERAPY | Facility: CLINIC | Age: 61
Setting detail: THERAPIES SERIES
Discharge: HOME | End: 2021-04-30
Payer: COMMERCIAL

## 2021-04-30 PROCEDURE — 97110 THERAPEUTIC EXERCISES: CPT

## 2021-04-30 NOTE — FLOWSHEET NOTE
[] [address]  [address]  [phone]  F: [phone] [] [address]  Located within [facility] [address]   [address]  P: [phone]  F: [phone] [x] [address]  [address]  P: [phone]  F: [phone] [] [address]  P: [phone]  F: [phone] [] [address]  [facility]   [address]   [place]: [phone]  F: [phone]      Physical Therapy Daily Treatment Note/ Progress Note    Date:  2021  Patient Name:  Liz Becker    :  1960  MRN: [id number]  Physician: Romulo Coleman DO                                               Insurance: [employer]  Medical Diagnosis: Arthritis of left ankle                  Rehab Codes: M19.072  Onset date: 3/10/21                           Next 's appt. : --  Visit# / total visits:    Cancels/No Shows: 0/0      Subjective:  Pt presents to PT with no pain, only reporting stiffness in L ankle.   Pain:  [] Yes  [x] No  Location: L ankle  Pain Rating: (0-10 scale) 0/10  Pain altered Tx:  [x] No  [] Yes  Action:  Comments:      Objective:  Modalities:   Precautions: NONE  Exercises:  Exercise  L Ankle Reps/ Time Weight/ Level Comments    Bike 10'      x              Gastroc belt S 3x30\"     x    SB S 3x30\"      x   Gastroc step S 3x30\"   x              Ankle AROM 2x15, 5\"   DF/PF, Inv/Ev    Ankle circles 2x20        Ankle alphabet 3x        4 way ankle 25x,15x  lime  PF/DF, Inv/Ev                BAPS board 2x20  L3 All directions  x              Rebounder 2x25     x   BOSU steps 3x10  bilat    x   BOSU step ups 20x LLE     4 way hip 15x  lime CKC    Heel raises 3x15     x   Mat squats 2x10      SL Stance 5x30\"   x   Eccentric calf raise 1x15   x Tandem stance walking 6L  // bars x   Half foam roller balance 3x30\"   x                 Other:        Treatment Charges: Mins Units   []  Modalities     [x]  Ther Exercise 44 3   []  Manual Therapy     []  Ther Activities     []  Aquatics     []  Vasocompression     []  Other     Total Treatment time 44 3       Assessment: [x] Progressing toward goals. Continued tx with focus on ROM and proprioceptive control. Pt with good tolerance throughout treatment and continue to report no pain with exercises. Pt still with some difficulty with balance during most proprioceptive exercises at this time. Able to add tandem stance walking and half foam roller balance today with no complaints, however does demonstrate difficulty, especially with foam roller. No reports of pain following treatment. Continue to progress balance and proprioceptive exercises as able. [] No change. [] Other:  [x] Patient would continue to benefit from skilled physical therapy services in order to: Progress ankle ROM, strength, and stability to help improve tolerance to all functional activities    STG: (to be met in 6 treatments)  1. ? Pain: Decrease pain levels to 6/10 with all activities to ease tolerance to activity.: ONGOING, still up to 7-8/10 (4/16/21)  2. ? ROM: Increase L ankle DF ROM to neutral to help improve mechanics with all functional mobility, reducing need for compensations with activity.: MET, pt with 2* DF (4/16/21)  3. Independent with Home Exercise Programs: MET (4/16/21)     LTG: (to be met in 12 treatments)  1. Improve score on LEFS from 32% impaired to <22% impaired to help improve function with L ankle. 2. ? Strength: Increase  L ankle strength to 4/5 grossly to help improve stability in L ankle. 3. ? Function: Pt will demonstrate ability to ambulate without any gait deviations to reduce risk for compensatory techniques with gait.   4. Reduce pain levels to 0/10                    Patient goals: \"More ROM, less

## 2021-05-04 ENCOUNTER — HOSPITAL ENCOUNTER (OUTPATIENT)
Dept: PHYSICAL THERAPY | Facility: CLINIC | Age: 61
Setting detail: THERAPIES SERIES
Discharge: HOME | End: 2021-05-04
Payer: COMMERCIAL

## 2021-05-04 PROCEDURE — 97110 THERAPEUTIC EXERCISES: CPT

## 2021-05-04 NOTE — FLOWSHEET NOTE
[] [facility]  [address]  [phone]  F: [phone] [] [address]  [facility]   [address]  P: [phone]  F: [phone] [x] [address] [facility]  [address]  P: [phone]  F: [phone] [] [address]  P: [phone]  F: [phone] [] [address]  [facility]   [address]   [place]: [phone]  F: [phone]      Physical Therapy Daily Treatment Note/ Progress Note    Date:  2021  Patient Name:  Lacy Lesch    :  1960  MRN: [id number]  Physician: Braden Miller DO                                               Insurance: [employer]  Medical Diagnosis: Arthritis of left ankle                  Rehab Codes: M19.072  Onset date: 3/10/21                           Next 's appt. : --  Visit# / total visits: 10/12   Cancels/No Shows: 0/0      Subjective:  Pt arrives without any pain and states stiffness in L ankle is \"not too bad\".    Pain:  [] Yes  [x] No  Location: L ankle  Pain Rating: (0-10 scale) 0/10  Pain altered Tx:  [x] No  [] Yes  Action:  Comments:      Objective:  Modalities:   Precautions: NONE  Exercises:  Exercise  L Ankle Reps/ Time Weight/ Level Comments    Bike 10'      x              Gastroc belt S 3x30\"     x    SB S 3x30\"      x   Gastroc step S 3x30\"   x              Ankle AROM 2x15, 5\"   DF/PF, Inv/Ev    Ankle circles 2x20        Ankle alphabet 3x        4 way ankle 25x,15x  lime  PF/DF, Inv/Ev                BAPS board 2x20  L3 All directions  x              Rebounder 2x25     x   BOSU steps 3x10  bilat    x   BOSU step ups 20x LLE     4 way hip 15x  lime CKC    Heel raises 3x15     x   Mat squats 2x10      SL Stance 5x30\"   x   Eccentric calf raise 2x10   x   Tandem stance walking 6L  // bars x   Half foam roller balance 4x30\"   x                 Other:        Treatment Charges: Mins Units   []  Modalities     [x]  Ther Exercise 45 3   []  Manual Therapy     []  Ther Activities     []  Aquatics     []  Vasocompression     []  Other     Total Treatment time 45 3       Assessment: [x] Progressing toward goals. Continued to address ROM and balance capabilities. Pt able to tolerate an additional set on foam roller with improved stability demonstrated along with increased reps for eccentric calf raises. Pt expressed most challenging ex to be SLS with rebounder as pt displayed mod LOB. Pt fatigued post session however denies any pain or discomfort. Pt reports he usually stretches when he gets home to maintain increased flexibility. Will cont to progress balance as tolerated. [] No change. [] Other:  [x] Patient would continue to benefit from skilled physical therapy services in order to: Progress ankle ROM, strength, and stability to help improve tolerance to all functional activities    STG: (to be met in 6 treatments)  1. ? Pain: Decrease pain levels to 6/10 with all activities to ease tolerance to activity.: ONGOING, still up to 7-8/10 (4/16/21)  2. ? ROM: Increase L ankle DF ROM to neutral to help improve mechanics with all functional mobility, reducing need for compensations with activity.: MET, pt with 2* DF (4/16/21)  3. Independent with Home Exercise Programs: MET (4/16/21)     LTG: (to be met in 12 treatments)  1. Improve score on LEFS from 32% impaired to <22% impaired to help improve function with L ankle. 2. ? Strength: Increase  L ankle strength to 4/5 grossly to help improve stability in L ankle. 3. ? Function: Pt will demonstrate ability to ambulate without any gait deviations to reduce risk for compensatory techniques with gait.   4. Reduce pain levels to 0/10                    Patient goals: \"More ROM, less pain after physical activity\"    Pt. Education:  [x] Yes  [] No  [x] Reviewed Prior HEP/Ed  Method of Education: [x] Verbal  [x] Demo  [] Written  Comprehension of Education:  [x] Verbalizes understanding. [x] Demonstrates understanding. [x] Needs review. [] Demonstrates/verbalizes HEP/Ed previously given. Plan: [x] Continue current frequency toward long and short term goals. [x] Specific Instructions for subsequent treatments: Continue tx per POC      Time In: 6:58 am            Time Out: 7:46 am    Electronically signed by:   Chad Frederick [place]

## 2021-05-05 ENCOUNTER — HOSPITAL ENCOUNTER (OUTPATIENT)
Dept: PHYSICAL THERAPY | Facility: CLINIC | Age: 61
Setting detail: THERAPIES SERIES
Discharge: HOME | End: 2021-05-05
Payer: COMMERCIAL

## 2021-05-05 PROCEDURE — 97110 THERAPEUTIC EXERCISES: CPT

## 2021-05-05 NOTE — FLOWSHEET NOTE
raises 3x10    off step by TM x   Mat squats 2x10      SL Stance 5x30\"   x   Eccentric calf raise 2x10   x   Tandem stance walking 6L  // bars x   Half foam roller balance 4x30\"   x                 Other:        Treatment Charges: Mins Units   []  Modalities     [x]  Ther Exercise 41 3   []  Manual Therapy     []  Ther Activities     []  Aquatics     []  Vasocompression     []  Other     Total Treatment time 41 3       Assessment: [x] Progressing toward goals. Pt able to complete charted therex with good tolerance. Progressed heel raises to be completed off small step to increase ROM pt is strengthening in, increased soreness noted. Balance therex do continue to prove challenging but pt able to tolerate without reports of increased L ankle pain. Pt scheduled for last visit on current POC next Monday. [] No change. [] Other:  [x] Patient would continue to benefit from skilled physical therapy services in order to: Progress ankle ROM, strength, and stability to help improve tolerance to all functional activities    STG: (to be met in 6 treatments)  1. ? Pain: Decrease pain levels to 6/10 with all activities to ease tolerance to activity.: ONGOING, still up to 7-8/10 (4/16/21)  2. ? ROM: Increase L ankle DF ROM to neutral to help improve mechanics with all functional mobility, reducing need for compensations with activity.: MET, pt with 2* DF (4/16/21)  3. Independent with Home Exercise Programs: MET (4/16/21)     LTG: (to be met in 12 treatments)  1. Improve score on LEFS from 32% impaired to <22% impaired to help improve function with L ankle. 2. ? Strength: Increase  L ankle strength to 4/5 grossly to help improve stability in L ankle. 3. ? Function: Pt will demonstrate ability to ambulate without any gait deviations to reduce risk for compensatory techniques with gait. 4. Reduce pain levels to 0/10                    Patient goals: \"More ROM, less pain after physical activity\"    Pt.  Education:  [x] Yes  [] No  [x] Reviewed Prior HEP/Ed  Method of Education: [x] Verbal  [x] Demo  [] Written  Comprehension of Education:  [x] Verbalizes understanding. [x] Demonstrates understanding. [x] Needs review. [] Demonstrates/verbalizes HEP/Ed previously given. Plan: [x] Continue current frequency toward long and short term goals.     [x] Specific Instructions for subsequent treatments: Continue tx per POC      Time In: 6:58 am            Time Out: 7:42 am    Electronically signed by:  Tyrone Huitron, PTA

## 2021-05-10 ENCOUNTER — HOSPITAL ENCOUNTER (OUTPATIENT)
Dept: PHYSICAL THERAPY | Facility: CLINIC | Age: 61
Setting detail: THERAPIES SERIES
Discharge: HOME | End: 2021-05-10
Payer: COMMERCIAL

## 2021-05-10 PROCEDURE — 97110 THERAPEUTIC EXERCISES: CPT

## 2021-05-10 NOTE — DISCHARGE SUMMARY
[] [facility]  [address]  [phone]  F: [phone] [] [address]  [facility]   [address]  P: [phone]  F: [phone] [x] [address]  [address]  P: [phone]  F: [phone] [] [address]  P: [phone]  F: [phone] [] [address]  [facility]   [address]   [place]: [phone]  F: [phone]      Physical Therapy Daily Treatment Note/ Discharge Note    Date:  5/10/2021  Patient Name:  Liana Burgos    :  1960  MRN: [id number]  Physician: Dariusz Fox DO                                               Insurance: [employer]  Medical Diagnosis: Arthritis of left ankle                  Rehab Codes: M19.072  Onset date: 3/10/21                           Next 's appt. : --  Visit# / total visits:    Cancels/No Shows: 0/0      Subjective:  Pt reports to therapy stating that he had some increased pain for a few days with WB following last treatment, however notes that this has since improved.  No complaints upon arrival.  Pain:  [] Yes  [x] No  Location: L ankle  Pain Rating: (0-10 scale) 0/10  Pain altered Tx:  [x] No  [] Yes  Action:  Comments:      Objective:  Modalities:   Precautions: NONE  Exercises:  Exercise  L Ankle Reps/ Time Weight/ Level Comments    Bike 10'      x              Gastroc belt S 3x30\"         SB S 3x30\"      x   Gastroc step S 3x30\"   x              Ankle AROM 2x15, 5\"   DF/PF, Inv/Ev    Ankle circles 2x20        Ankle alphabet 3x        4 way ankle 25x,15x  lime  PF/DF, Inv/Ev                BAPS board 2x20  L3 All directions  x              Rebounder 2x25     x   BOSU steps 2x15  bilat    x   BOSU step ups 20x LLE     4 way hip 15x  lime CKC    Heel raises 3x15    off step by TM x   Mat squats 2x10      SL Stance 5x30\"   x   Eccentric calf raise 3x12   x   Tandem stance walking 8L  // bars x   Half foam roller balance 5x30\"   x                 Other:        Treatment Charges: Mins Units   []  Modalities     [x]  Ther Exercise 28 2   []  Manual Therapy     []  Ther Activities     []  Aquatics     []  Vasocompression     []  Other     Total Treatment time 28 2       Assessment: [x] Progressing toward goals. Continued with tx per log above with good tolerance, focusing on ensuring pt has proper technique with HEP. Pt requires very little cueing throughout session today with good exercise recall. Pt reports that he feels like he has more mobility in ankle following today's treatment. At this time, pt reports that he is ready to DC from PT and continue with HEP. Pt states that he has seen improvements in his mobility and stability, noting that he had an easier time walking along the golf course the last time that he went. Per pt, he notes that he is able to do most exercises at home to continue to work on progressing ankle stability and strength. Discussed with pt the benefits of being consistent with Hep with pt verbalizing understanding. [] No change. [] Other:  [x] Patient would continue to benefit from skilled physical therapy services in order to: Progress ankle ROM, strength, and stability to help improve tolerance to all functional activities    STG: (to be met in 6 treatments)  1. ? Pain: Decrease pain levels to 6/10 with all activities to ease tolerance to activity.: ONGOING, still up to 6-7/10 (5/10/21)  2. ? ROM: Increase L ankle DF ROM to neutral to help improve mechanics with all functional mobility, reducing need for compensations with activity.: MET, pt with 2* DF (4/16/21)  3. Independent with Home Exercise Programs: MET (4/16/21)     LTG: (to be met in 12 treatments)  1.  Improve score on LEFS from 32% impaired to <22%

## 2021-05-18 ENCOUNTER — OFFICE VISIT (OUTPATIENT)
Dept: FAMILY MEDICINE CLINIC | Age: 61
End: 2021-05-18
Payer: COMMERCIAL

## 2021-05-18 VITALS
OXYGEN SATURATION: 97 % | WEIGHT: 234.4 LBS | DIASTOLIC BLOOD PRESSURE: 90 MMHG | HEART RATE: 99 BPM | SYSTOLIC BLOOD PRESSURE: 150 MMHG | BODY MASS INDEX: 30.1 KG/M2

## 2021-05-18 DIAGNOSIS — G43.709 CHRONIC MIGRAINE WITHOUT AURA WITHOUT STATUS MIGRAINOSUS, NOT INTRACTABLE: ICD-10-CM

## 2021-05-18 DIAGNOSIS — I10 ESSENTIAL HYPERTENSION: Primary | ICD-10-CM

## 2021-05-18 PROCEDURE — 99213 OFFICE O/P EST LOW 20 MIN: CPT | Performed by: FAMILY MEDICINE

## 2021-05-18 PROCEDURE — G8427 DOCREV CUR MEDS BY ELIG CLIN: HCPCS | Performed by: FAMILY MEDICINE

## 2021-05-18 PROCEDURE — 1036F TOBACCO NON-USER: CPT | Performed by: FAMILY MEDICINE

## 2021-05-18 PROCEDURE — G8417 CALC BMI ABV UP PARAM F/U: HCPCS | Performed by: FAMILY MEDICINE

## 2021-05-18 PROCEDURE — 3017F COLORECTAL CA SCREEN DOC REV: CPT | Performed by: FAMILY MEDICINE

## 2021-05-18 RX ORDER — LISINOPRIL 10 MG/1
10 TABLET ORAL DAILY
Qty: 30 TABLET | Refills: 5 | Status: SHIPPED | OUTPATIENT
Start: 2021-05-18 | End: 2021-06-17 | Stop reason: SDUPTHER

## 2021-05-18 ASSESSMENT — ENCOUNTER SYMPTOMS
EYE REDNESS: 0
FACIAL SWEATING: 0
NAUSEA: 1
COUGH: 0
RHINORRHEA: 0
ABDOMINAL PAIN: 0
SCALP TENDERNESS: 0
VOMITING: 0
EYE PAIN: 0
BACK PAIN: 0
SORE THROAT: 0
ORTHOPNEA: 0
SINUS PRESSURE: 0
EYE WATERING: 0
PHOTOPHOBIA: 1
SWOLLEN GLANDS: 0
BLURRED VISION: 0
VISUAL CHANGE: 0

## 2021-05-18 NOTE — PROGRESS NOTES
[facility] Progress Note    Date:5/18/2021         Patient Julissa Bryan     YOB: 1960     Age:61 y.o. Assessment/Plan        Problem List Items Addressed This Visit        Circulatory    Essential hypertension - Primary      Uncontrolled, changes made today: start Lisinopril 10mg, medication adherence emphasized and lifestyle modifications recommended   Continue home BP log         Relevant Medications    lisinopril (PRINIVIL;ZESTRIL) 10 MG tablet       Nervous and Auditory    Chronic migraine without aura without status migrainosus, not intractable     Improved to 2x per week  Controlled on prn Maxalt  Sees neurology                Return in about 1 month (around 6/18/2021). Electronically signed by Harris Quiñonez DO on 5/18/21         Brad Anderson is a 63 y.o. male presenting today for   Chief Complaint   Patient presents with    3 Month Follow-Up    Blood Pressure Check   . Hypertension  This is a new problem. The current episode started more than 1 month ago. The problem has been gradually worsening since onset. The problem is uncontrolled. Pertinent negatives include no anxiety, blurred vision, malaise/fatigue, neck pain, orthopnea, palpitations, peripheral edema, PND or sweats. Past treatments include nothing. The current treatment provides no improvement. Migraine   This is a chronic problem. The current episode started more than 1 year ago. The problem occurs intermittently. The problem has been gradually improving. The pain is located in the occipital region. The pain quality is similar to prior headaches. The quality of the pain is described as throbbing. The pain is severe. Associated symptoms include nausea, phonophobia and photophobia.  Pertinent negatives include no abdominal pain, abnormal behavior, anorexia, back pain, blurred vision, coughing, dizziness, drainage, ear pain, eye pain, eye redness, eye watering, facial sweating, fever, hearing loss, insomnia, loss of balance, muscle aches, neck pain, numbness, rhinorrhea, scalp tenderness, seizures, sinus pressure, sore throat, swollen glands, tingling, tinnitus, visual change, vomiting, weakness or weight loss. He has tried triptans (aimovig) for the symptoms. The treatment provided moderate relief. Review of Systems   Review of Systems   Constitutional: Negative for fever, malaise/fatigue and weight loss. HENT: Negative for ear pain, hearing loss, rhinorrhea, sinus pressure, sore throat and tinnitus. Eyes: Positive for photophobia. Negative for blurred vision, pain and redness. Respiratory: Negative for cough. Cardiovascular: Negative for palpitations, orthopnea and PND. Gastrointestinal: Positive for nausea. Negative for abdominal pain, anorexia and vomiting. Musculoskeletal: Negative for back pain and neck pain. Neurological: Negative for dizziness, tingling, seizures, weakness, numbness and loss of balance. Psychiatric/Behavioral: The patient does not have insomnia. All other systems reviewed and are negative. Medications     Current Outpatient Medications   Medication Sig Dispense Refill    lisinopril (PRINIVIL;ZESTRIL) 10 MG tablet Take 1 tablet by mouth daily 30 tablet 5    naproxen (NAPROSYN) 500 MG tablet Take 1 tablet by mouth 2 times daily (with meals) Take with food. 30 tablet 0    amitriptyline (ELAVIL) 25 MG tablet Take 3 tablets by mouth nightly 90 tablet 5    rizatriptan (MAXALT) 10 MG tablet Take 1 tablet by mouth once as needed for Migraine May repeat in 2 hours if needed 12 tablet 5    Misc.  Devices MISC Incentive spirometer - use BID (Patient not taking: Reported on 4/8/2021) 1 Device 0    atorvastatin (LIPITOR) 20 MG tablet TAKE 1 TABLET DAILY 90 tablet 1    Erenumab-aooe (AIMOVIG) 140 MG/ML SOAJ Inject 140 mg into the skin every 30 days 1 mL 5    butalbital-acetaminophen-caffeine (FIORICET, ESGIC) -40 MG per tablet Take 1 tablet by mouth 2 times daily as needed for Headaches 60 tablet 3     No current facility-administered medications for this visit. Past History    Past Medical History:   has a past medical history of COVID-19, Diverticular disease of colon, and History of blood clot to lungs during pregnancy. Social History:   reports that he quit smoking about 12 years ago. His smoking use included cigarettes. He has a 23.00 pack-year smoking history. He has never used smokeless tobacco. He reports current alcohol use. He reports that he does not use drugs. Family History:   Family History   Problem Relation Age of Onset    Cancer Mother         Breast CA       Surgical History:   Past Surgical History:   Procedure Laterality Date    COLECTOMY  2/1/15        HERNIA REPAIR  1990's    JOINT REPLACEMENT  2009    L TKA    REVISION COLOSTOMY      SIGMOIDECTOMY  2/1/15    [facility]/ Dr. Bi Gan        Physical Examination      Vitals:  BP (!) 150/90   Pulse 99   Wt 234 lb 6.4 oz (106.3 kg)   SpO2 97%   BMI 30.10 kg/m²     Physical Exam  Vitals and nursing note reviewed. Constitutional:       General: He is not in acute distress. Appearance: Normal appearance. He is obese. He is not ill-appearing, toxic-appearing or diaphoretic. HENT:      Head: Normocephalic and atraumatic. Right Ear: External ear normal.      Left Ear: External ear normal.   Eyes:      General: No scleral icterus. Right eye: No discharge. Left eye: No discharge. Conjunctiva/sclera: Conjunctivae normal.   Cardiovascular:      Rate and Rhythm: Normal rate and regular rhythm. Pulses: Normal pulses. Heart sounds: Normal heart sounds. No murmur heard. No friction rub. No gallop. Pulmonary:      Effort: Pulmonary effort is normal. No respiratory distress. Breath sounds: Normal breath sounds. No stridor. No wheezing, rhonchi or rales. Chest:      Chest wall: No tenderness.    Skin:     General: Skin is warm. Coloration: Skin is not jaundiced or pale. Neurological:      Mental Status: He is alert and oriented to person, place, and time. Mental status is at baseline. Psychiatric:         Mood and Affect: Mood normal.         Behavior: Behavior normal.         Thought Content: Thought content normal.         Judgment: Judgment normal.         Labs/Imaging/Diagnostics   Labs:  No results found for: CMPWITHGFR, CBCAUTODIF, TSHFT4, LABA1C, LIPIDPAN    Imaging Last 24 Hours:  XR FOOT LEFT (MIN 3 VIEWS)  Narrative: 3/26/2021 FINDINGS:  Three weightbearing views (AP, Mortise, and Lateral)   of the left ankle and three weightbearing views (AP, Oblique, Lateral) of   the left foot were obtained in the office today and reviewed, revealing no   acute fracture, dislocation, or radioopaque foreign body/tumor. Degenerative changes of tibiotalar joint with joint narrowing, sclerosis,   and osteophytes. Retained hardware noted in the distal tibia in both   medially and laterally.     Impression:   No acute fracture/dislocation. Degenerative changes with   retained hardware as above.      Electronically signed by Melissa Phillips MD  XR ANKLE LEFT (MIN 3 VIEWS)  Narrative: 3/26/2021 FINDINGS:  Three weightbearing views (AP, Mortise, and Lateral)   of the left ankle and three weightbearing views (AP, Oblique, Lateral) of   the left foot were obtained in the office today and reviewed, revealing no   acute fracture, dislocation, or radioopaque foreign body/tumor. Degenerative changes of tibiotalar joint with joint narrowing, sclerosis,   and osteophytes. Retained hardware noted in the distal tibia in both   medially and laterally.     Impression:   No acute fracture/dislocation.  Degenerative changes with   retained hardware as above.      Electronically signed by Melissa Phillips MD

## 2021-05-18 NOTE — ASSESSMENT & PLAN NOTE
Uncontrolled, changes made today: start Lisinopril 10mg, medication adherence emphasized and lifestyle modifications recommended   Continue home BP log

## 2021-05-18 NOTE — PATIENT INSTRUCTIONS
Patient Education        DASH Diet: Care Instructions  Your Care Instructions     The DASH diet is an eating plan that can help lower your blood pressure. DASH stands for Dietary Approaches to Stop Hypertension. Hypertension is high blood pressure. The DASH diet focuses on eating foods that are high in calcium, potassium, and magnesium. These nutrients can lower blood pressure. The foods that are highest in these nutrients are fruits, vegetables, low-fat dairy products, nuts, seeds, and legumes. But taking calcium, potassium, and magnesium supplements instead of eating foods that are high in those nutrients does not have the same effect. The DASH diet also includes whole grains, fish, and poultry. The DASH diet is one of several lifestyle changes your doctor may recommend to lower your high blood pressure. Your doctor may also want you to decrease the amount of sodium in your diet. Lowering sodium while following the DASH diet can lower blood pressure even further than just the DASH diet alone. Follow-up care is a key part of your treatment and safety. Be sure to make and go to all appointments, and call your doctor if you are having problems. It's also a good idea to know your test results and keep a list of the medicines you take. How can you care for yourself at home? Following the DASH diet  · Eat 4 to 5 servings of fruit each day. A serving is 1 medium-sized piece of fruit, ½ cup chopped or canned fruit, 1/4 cup dried fruit, or 4 ounces (½ cup) of fruit juice. Choose fruit more often than fruit juice. · Eat 4 to 5 servings of vegetables each day. A serving is 1 cup of lettuce or raw leafy vegetables, ½ cup of chopped or cooked vegetables, or 4 ounces (½ cup) of vegetable juice. Choose vegetables more often than vegetable juice. · Get 2 to 3 servings of low-fat and fat-free dairy each day. A serving is 8 ounces of milk, 1 cup of yogurt, or 1 ½ ounces of cheese. · Eat 6 to 8 servings of grains each day.  A serving is 1 slice of bread, 1 ounce of dry cereal, or ½ cup of cooked rice, pasta, or cooked cereal. Try to choose whole-grain products as much as possible. · Limit lean meat, poultry, and fish to 2 servings each day. A serving is 3 ounces, about the size of a deck of cards. · Eat 4 to 5 servings of nuts, seeds, and legumes (cooked dried beans, lentils, and split peas) each week. A serving is 1/3 cup of nuts, 2 tablespoons of seeds, or ½ cup of cooked beans or peas. · Limit fats and oils to 2 to 3 servings each day. A serving is 1 teaspoon of vegetable oil or 2 tablespoons of salad dressing. · Limit sweets and added sugars to 5 servings or less a week. A serving is 1 tablespoon jelly or jam, ½ cup sorbet, or 1 cup of lemonade. · Eat less than 2,300 milligrams (mg) of sodium a day. If you limit your sodium to 1,500 mg a day, you can lower your blood pressure even more. · Be aware that all of these are the suggested number of servings for people who eat 1,800 to 2,000 calories a day. Your recommended number of servings may be different if you need more or fewer calories. Tips for success  · Start small. Do not try to make dramatic changes to your diet all at once. You might feel that you are missing out on your favorite foods and then be more likely to not follow the plan. Make small changes, and stick with them. Once those changes become habit, add a few more changes. · Try some of the following:  ? Make it a goal to eat a fruit or vegetable at every meal and at snacks. This will make it easy to get the recommended amount of fruits and vegetables each day. ? Try yogurt topped with fruit and nuts for a snack or healthy dessert. ? Add lettuce, tomato, cucumber, and onion to sandwiches. ? Combine a ready-made pizza crust with low-fat mozzarella cheese and lots of vegetable toppings. Try using tomatoes, squash, spinach, broccoli, carrots, cauliflower, and onions. ?  Have a variety of cut-up vegetables with a

## 2021-06-17 DIAGNOSIS — I10 ESSENTIAL HYPERTENSION: ICD-10-CM

## 2021-06-17 RX ORDER — LISINOPRIL 10 MG/1
10 TABLET ORAL DAILY
Qty: 30 TABLET | Refills: 5 | Status: SHIPPED | OUTPATIENT
Start: 2021-06-17 | End: 2021-11-22

## 2021-06-17 NOTE — TELEPHONE ENCOUNTER
Dereck [place] is calling to request a refill on the following medication(s):    Medication Request:  Requested Prescriptions     Pending Prescriptions Disp Refills    lisinopril (PRINIVIL;ZESTRIL) 10 MG tablet 30 tablet 5     Sig: Take 1 tablet by mouth daily       Last Visit Date (If Applicable):  9/87/1238    Next Visit Date:    6/28/2021

## 2021-06-28 ENCOUNTER — OFFICE VISIT (OUTPATIENT)
Dept: FAMILY MEDICINE CLINIC | Age: 61
End: 2021-06-28
Payer: COMMERCIAL

## 2021-06-28 ENCOUNTER — PATIENT MESSAGE (OUTPATIENT)
Dept: NEUROLOGY | Age: 61
End: 2021-06-28

## 2021-06-28 VITALS
OXYGEN SATURATION: 96 % | BODY MASS INDEX: 30.12 KG/M2 | HEART RATE: 92 BPM | SYSTOLIC BLOOD PRESSURE: 120 MMHG | WEIGHT: 234.6 LBS | DIASTOLIC BLOOD PRESSURE: 80 MMHG

## 2021-06-28 DIAGNOSIS — E78.5 DYSLIPIDEMIA: ICD-10-CM

## 2021-06-28 DIAGNOSIS — G43.709 CHRONIC MIGRAINE WITHOUT AURA WITHOUT STATUS MIGRAINOSUS, NOT INTRACTABLE: ICD-10-CM

## 2021-06-28 DIAGNOSIS — Z12.5 PROSTATE CANCER SCREENING: ICD-10-CM

## 2021-06-28 DIAGNOSIS — I10 ESSENTIAL HYPERTENSION: Primary | ICD-10-CM

## 2021-06-28 PROBLEM — R03.0 ELEVATED BLOOD PRESSURE READING WITHOUT DIAGNOSIS OF HYPERTENSION: Status: RESOLVED | Noted: 2021-02-17 | Resolved: 2021-06-28

## 2021-06-28 PROCEDURE — 99214 OFFICE O/P EST MOD 30 MIN: CPT | Performed by: FAMILY MEDICINE

## 2021-06-28 PROCEDURE — 1036F TOBACCO NON-USER: CPT | Performed by: FAMILY MEDICINE

## 2021-06-28 PROCEDURE — G8427 DOCREV CUR MEDS BY ELIG CLIN: HCPCS | Performed by: FAMILY MEDICINE

## 2021-06-28 PROCEDURE — 3017F COLORECTAL CA SCREEN DOC REV: CPT | Performed by: FAMILY MEDICINE

## 2021-06-28 PROCEDURE — G8417 CALC BMI ABV UP PARAM F/U: HCPCS | Performed by: FAMILY MEDICINE

## 2021-06-28 SDOH — ECONOMIC STABILITY: FOOD INSECURITY: WITHIN THE PAST 12 MONTHS, YOU WORRIED THAT YOUR FOOD WOULD RUN OUT BEFORE YOU GOT MONEY TO BUY MORE.: NEVER TRUE

## 2021-06-28 SDOH — ECONOMIC STABILITY: FOOD INSECURITY: WITHIN THE PAST 12 MONTHS, THE FOOD YOU BOUGHT JUST DIDN'T LAST AND YOU DIDN'T HAVE MONEY TO GET MORE.: NEVER TRUE

## 2021-06-28 ASSESSMENT — ENCOUNTER SYMPTOMS
SHORTNESS OF BREATH: 0
FACIAL SWEATING: 0
ORTHOPNEA: 0
VISUAL CHANGE: 0
SCALP TENDERNESS: 0
EYE REDNESS: 0
ABDOMINAL PAIN: 0
PHOTOPHOBIA: 1
RESPIRATORY NEGATIVE: 1
VOMITING: 0
SORE THROAT: 0
NAUSEA: 1
BLURRED VISION: 0
COUGH: 0
ALLERGIC/IMMUNOLOGIC NEGATIVE: 1
EYE WATERING: 0
SWOLLEN GLANDS: 0
EYE PAIN: 0
BACK PAIN: 0
RHINORRHEA: 0
SINUS PRESSURE: 0

## 2021-06-28 ASSESSMENT — SOCIAL DETERMINANTS OF HEALTH (SDOH): HOW HARD IS IT FOR YOU TO PAY FOR THE VERY BASICS LIKE FOOD, HOUSING, MEDICAL CARE, AND HEATING?: NOT HARD AT ALL

## 2021-06-28 ASSESSMENT — PATIENT HEALTH QUESTIONNAIRE - PHQ9
SUM OF ALL RESPONSES TO PHQ QUESTIONS 1-9: 0
SUM OF ALL RESPONSES TO PHQ9 QUESTIONS 1 & 2: 0
2. FEELING DOWN, DEPRESSED OR HOPELESS: 0
SUM OF ALL RESPONSES TO PHQ QUESTIONS 1-9: 0
SUM OF ALL RESPONSES TO PHQ QUESTIONS 1-9: 0
1. LITTLE INTEREST OR PLEASURE IN DOING THINGS: 0

## 2021-06-28 NOTE — PATIENT INSTRUCTIONS
Patient Education        DASH Diet: Care Instructions  Your Care Instructions     The DASH diet is an eating plan that can help lower your blood pressure. DASH stands for Dietary Approaches to Stop Hypertension. Hypertension is high blood pressure. The DASH diet focuses on eating foods that are high in calcium, potassium, and magnesium. These nutrients can lower blood pressure. The foods that are highest in these nutrients are fruits, vegetables, low-fat dairy products, nuts, seeds, and legumes. But taking calcium, potassium, and magnesium supplements instead of eating foods that are high in those nutrients does not have the same effect. The DASH diet also includes whole grains, fish, and poultry. The DASH diet is one of several lifestyle changes your doctor may recommend to lower your high blood pressure. Your doctor may also want you to decrease the amount of sodium in your diet. Lowering sodium while following the DASH diet can lower blood pressure even further than just the DASH diet alone. Follow-up care is a key part of your treatment and safety. Be sure to make and go to all appointments, and call your doctor if you are having problems. It's also a good idea to know your test results and keep a list of the medicines you take. How can you care for yourself at home? Following the DASH diet  · Eat 4 to 5 servings of fruit each day. A serving is 1 medium-sized piece of fruit, ½ cup chopped or canned fruit, 1/4 cup dried fruit, or 4 ounces (½ cup) of fruit juice. Choose fruit more often than fruit juice. · Eat 4 to 5 servings of vegetables each day. A serving is 1 cup of lettuce or raw leafy vegetables, ½ cup of chopped or cooked vegetables, or 4 ounces (½ cup) of vegetable juice. Choose vegetables more often than vegetable juice. · Get 2 to 3 servings of low-fat and fat-free dairy each day. A serving is 8 ounces of milk, 1 cup of yogurt, or 1 ½ ounces of cheese. · Eat 6 to 8 servings of grains each day.  A serving is 1 slice of bread, 1 ounce of dry cereal, or ½ cup of cooked rice, pasta, or cooked cereal. Try to choose whole-grain products as much as possible. · Limit lean meat, poultry, and fish to 2 servings each day. A serving is 3 ounces, about the size of a deck of cards. · Eat 4 to 5 servings of nuts, seeds, and legumes (cooked dried beans, lentils, and split peas) each week. A serving is 1/3 cup of nuts, 2 tablespoons of seeds, or ½ cup of cooked beans or peas. · Limit fats and oils to 2 to 3 servings each day. A serving is 1 teaspoon of vegetable oil or 2 tablespoons of salad dressing. · Limit sweets and added sugars to 5 servings or less a week. A serving is 1 tablespoon jelly or jam, ½ cup sorbet, or 1 cup of lemonade. · Eat less than 2,300 milligrams (mg) of sodium a day. If you limit your sodium to 1,500 mg a day, you can lower your blood pressure even more. · Be aware that all of these are the suggested number of servings for people who eat 1,800 to 2,000 calories a day. Your recommended number of servings may be different if you need more or fewer calories. Tips for success  · Start small. Do not try to make dramatic changes to your diet all at once. You might feel that you are missing out on your favorite foods and then be more likely to not follow the plan. Make small changes, and stick with them. Once those changes become habit, add a few more changes. · Try some of the following:  ? Make it a goal to eat a fruit or vegetable at every meal and at snacks. This will make it easy to get the recommended amount of fruits and vegetables each day. ? Try yogurt topped with fruit and nuts for a snack or healthy dessert. ? Add lettuce, tomato, cucumber, and onion to sandwiches. ? Combine a ready-made pizza crust with low-fat mozzarella cheese and lots of vegetable toppings. Try using tomatoes, squash, spinach, broccoli, carrots, cauliflower, and onions. ?  Have a variety of cut-up vegetables with a low-fat dip as an appetizer instead of chips and dip. ? Sprinkle sunflower seeds or chopped almonds over salads. Or try adding chopped walnuts or almonds to cooked vegetables. ? Try some vegetarian meals using beans and peas. Add garbanzo or kidney beans to salads. Make burritos and tacos with mashed ash beans or black beans. Where can you learn more? Go to [email or web address].[email or web address].com. org and sign in to your [employer] account. Enter X435 in the AppSurfer box to learn more about \"DASH Diet: Care Instructions. \"     If you do not have an account, please click on the \"Sign Up Now\" link. Current as of: August 31, 2020               Content Version: 12.9  © 4723-2234 [employer], Incorporated. Care instructions adapted under license by [facility] ([facility]). If you have questions about a medical condition or this instruction, always ask your healthcare professional. [address]  any warranty or liability for your use of this information.

## 2021-06-28 NOTE — ASSESSMENT & PLAN NOTE
Monitored by specialist- no acute findings meriting change in the plan   Improved dramatically on Botox injections, aimovig and prn maxalt/fioricet

## 2021-06-28 NOTE — PROGRESS NOTES
[facility] Progress Note    Date:6/28/2021         Patient Lawyer Moreno     YOB: 1960     Age:61 y.o. Assessment/Plan        Problem List Items Addressed This Visit        Circulatory    Essential hypertension - Primary      Well-controlled, continue current medications, continue current treatment plan, medication adherence emphasized and lifestyle modifications recommended   Lisinopril working - denies cough            Nervous and Auditory    Chronic migraine without aura without status migrainosus, not intractable      Monitored by specialist- no acute findings meriting change in the plan   Improved dramatically on Botox injections, aimovig and prn maxalt/fioricet            Other    Dyslipidemia      Well-controlled, continue current medications, medication adherence emphasized and lifestyle modifications recommended         Relevant Orders    Comprehensive Metabolic Panel    Lipid Panel      Other Visit Diagnoses     Prostate cancer screening        Relevant Orders    PSA screening           Return in about 3 months (around 9/28/2021). Electronically signed by Abida Black DO on 6/28/21         Brad Rocha is a 63 y.o. male presenting today for   Chief Complaint   Patient presents with    1 Month Follow-Up   . Traveling and eating junk - grab and go  BP better  Headaches are better    Hypertension  This is a new problem. The current episode started more than 1 month ago. The problem has been gradually worsening since onset. The problem is controlled. Pertinent negatives include no anxiety, blurred vision, chest pain, headaches, malaise/fatigue, neck pain, orthopnea, palpitations, peripheral edema, PND, shortness of breath or sweats. Past treatments include ACE inhibitors. The current treatment provides significant improvement. There is no history of chronic renal disease. Migraine   This is a chronic problem.  The current episode started more than 1 year ago. The problem occurs intermittently. The problem has been gradually improving. The pain is located in the occipital region. The pain quality is similar to prior headaches. The quality of the pain is described as throbbing. The pain is severe. Associated symptoms include nausea, phonophobia and photophobia. Pertinent negatives include no abdominal pain, abnormal behavior, anorexia, back pain, blurred vision, coughing, dizziness, drainage, ear pain, eye pain, eye redness, eye watering, facial sweating, fever, hearing loss, insomnia, loss of balance, muscle aches, neck pain, numbness, rhinorrhea, scalp tenderness, seizures, sinus pressure, sore throat, swollen glands, tingling, tinnitus, visual change, vomiting, weakness or weight loss. He has tried triptans (aimovig, botox) for the symptoms. The treatment provided moderate relief. His past medical history is significant for obesity. Hyperlipidemia  This is a chronic problem. The current episode started more than 1 year ago. The problem is controlled. Recent lipid tests were reviewed and are normal. Exacerbating diseases include obesity. He has no history of chronic renal disease, diabetes, hypothyroidism, liver disease or nephrotic syndrome. Pertinent negatives include no chest pain, focal sensory loss, focal weakness, leg pain, myalgias or shortness of breath. Current antihyperlipidemic treatment includes statins. The current treatment provides significant improvement of lipids. Review of Systems   Review of Systems   Constitutional: Negative. Negative for fever, malaise/fatigue and weight loss. HENT: Negative. Negative for ear pain, hearing loss, rhinorrhea, sinus pressure, sore throat and tinnitus. Eyes: Positive for photophobia. Negative for blurred vision, pain and redness. Respiratory: Negative. Negative for cough and shortness of breath. Cardiovascular: Negative. Negative for chest pain, palpitations, orthopnea and PND. Gastrointestinal: Positive for nausea. Negative for abdominal pain, anorexia and vomiting. Endocrine: Negative. Genitourinary: Negative. Musculoskeletal: Negative. Negative for back pain, myalgias and neck pain. Skin: Negative. Allergic/Immunologic: Negative. Neurological: Negative. Negative for dizziness, tingling, focal weakness, seizures, weakness, numbness, headaches and loss of balance. Hematological: Negative. Psychiatric/Behavioral: Negative. The patient does not have insomnia. All other systems reviewed and are negative. Medications     Current Outpatient Medications   Medication Sig Dispense Refill    lisinopril (PRINIVIL;ZESTRIL) 10 MG tablet Take 1 tablet by mouth daily 30 tablet 5    naproxen (NAPROSYN) 500 MG tablet Take 1 tablet by mouth 2 times daily (with meals) Take with food. 30 tablet 0    amitriptyline (ELAVIL) 25 MG tablet Take 3 tablets by mouth nightly 90 tablet 5    rizatriptan (MAXALT) 10 MG tablet Take 1 tablet by mouth once as needed for Migraine May repeat in 2 hours if needed 12 tablet 5    Misc. Devices MISC Incentive spirometer - use BID (Patient not taking: Reported on 4/8/2021) 1 Device 0    atorvastatin (LIPITOR) 20 MG tablet TAKE 1 TABLET DAILY 90 tablet 1    Erenumab-aooe (AIMOVIG) 140 MG/ML SOAJ Inject 140 mg into the skin every 30 days 1 mL 5    butalbital-acetaminophen-caffeine (FIORICET, ESGIC) -40 MG per tablet Take 1 tablet by mouth 2 times daily as needed for Headaches 60 tablet 3     No current facility-administered medications for this visit. Past History    Past Medical History:   has a past medical history of COVID-19, Diverticular disease of colon, and History of blood clot to lungs during pregnancy. Social History:   reports that he quit smoking about 12 years ago. His smoking use included cigarettes. He has a 23.00 pack-year smoking history. He has never used smokeless tobacco. He reports current alcohol use. He reports that he does not use drugs. Family History:   Family History   Problem Relation Age of Onset    Cancer Mother         Breast CA       Surgical History:   Past Surgical History:   Procedure Laterality Date    COLECTOMY  2/1/15        HERNIA REPAIR  1990's    JOINT REPLACEMENT  2009    L TKA    REVISION COLOSTOMY      SIGMOIDECTOMY  2/1/15    [facility]/ Dr. Sepideh العلي        Physical Examination      Vitals:  /80 (Site: Left Upper Arm, Position: Sitting, Cuff Size: Medium Adult)   Pulse 92   Wt 234 lb 9.6 oz (106.4 kg)   SpO2 96%   BMI 30.12 kg/m²     Physical Exam  Vitals and nursing note reviewed. Constitutional:       General: He is not in acute distress. Appearance: Normal appearance. He is obese. He is not ill-appearing, toxic-appearing or diaphoretic. HENT:      Head: Normocephalic and atraumatic. Right Ear: External ear normal.      Left Ear: External ear normal.   Eyes:      General: No scleral icterus. Right eye: No discharge. Left eye: No discharge. Conjunctiva/sclera: Conjunctivae normal.   Cardiovascular:      Rate and Rhythm: Normal rate and regular rhythm. Pulses: Normal pulses. Heart sounds: Normal heart sounds. No murmur heard. No friction rub. No gallop. Pulmonary:      Effort: Pulmonary effort is normal. No respiratory distress. Breath sounds: Normal breath sounds. No stridor. No wheezing, rhonchi or rales. Chest:      Chest wall: No tenderness. Skin:     General: Skin is warm. Coloration: Skin is not jaundiced or pale. Neurological:      Mental Status: He is alert and oriented to person, place, and time. Mental status is at baseline. Psychiatric:         Mood and Affect: Mood normal.         Behavior: Behavior normal.         Thought Content:  Thought content normal.         Judgment: Judgment normal.         Labs/Imaging/Diagnostics   Labs:  No results found for: CMPWITHGFR, CBCAUTODIF, TSHFT4, LABA1C, LIPIDPAN    Imaging Last 24 Hours:  XR FOOT LEFT (MIN 3 VIEWS)  Narrative: 3/26/2021 FINDINGS:  Three weightbearing views (AP, Mortise, and Lateral)   of the left ankle and three weightbearing views (AP, Oblique, Lateral) of   the left foot were obtained in the office today and reviewed, revealing no   acute fracture, dislocation, or radioopaque foreign body/tumor. Degenerative changes of tibiotalar joint with joint narrowing, sclerosis,   and osteophytes. Retained hardware noted in the distal tibia in both   medially and laterally.     Impression:   No acute fracture/dislocation. Degenerative changes with   retained hardware as above.      Electronically signed by Isaiah Mitchell MD  XR ANKLE LEFT (MIN 3 VIEWS)  Narrative: 3/26/2021 FINDINGS:  Three weightbearing views (AP, Mortise, and Lateral)   of the left ankle and three weightbearing views (AP, Oblique, Lateral) of   the left foot were obtained in the office today and reviewed, revealing no   acute fracture, dislocation, or radioopaque foreign body/tumor. Degenerative changes of tibiotalar joint with joint narrowing, sclerosis,   and osteophytes. Retained hardware noted in the distal tibia in both   medially and laterally.     Impression:   No acute fracture/dislocation.  Degenerative changes with   retained hardware as above.      Electronically signed by Isaiah Mitchell MD

## 2021-07-06 ENCOUNTER — TELEPHONE (OUTPATIENT)
Dept: NEUROLOGY | Age: 61
End: 2021-07-06

## 2021-07-08 LAB
ALBUMIN SERPL-MCNC: 4.3 G/DL
ALP BLD-CCNC: 93 U/L
ALT SERPL-CCNC: 23 U/L
ANION GAP SERPL CALCULATED.3IONS-SCNC: NORMAL MMOL/L
AST SERPL-CCNC: 19 U/L
BILIRUB SERPL-MCNC: 0.7 MG/DL (ref 0.1–1.4)
BUN BLDV-MCNC: 12 MG/DL
CALCIUM SERPL-MCNC: 9.5 MG/DL
CHLORIDE BLD-SCNC: 103 MMOL/L
CO2: 29 MMOL/L
CREAT SERPL-MCNC: 0.97 MG/DL
GFR CALCULATED: 85
GLUCOSE BLD-MCNC: 93 MG/DL
POTASSIUM SERPL-SCNC: 4.4 MMOL/L
PROSTATE SPECIFIC ANTIGEN: 1.7 NG/ML
SODIUM BLD-SCNC: 139 MMOL/L
TOTAL PROTEIN: 6.9

## 2021-07-09 RX ORDER — RIZATRIPTAN BENZOATE 10 MG/1
10 TABLET ORAL
Qty: 12 TABLET | Refills: 1 | Status: SHIPPED | OUTPATIENT
Start: 2021-07-09 | End: 2021-09-08

## 2021-07-09 NOTE — TELEPHONE ENCOUNTER
Pt called in and had sent My Chart message. He is going out of town Monday a.m. and said he had a bad bout of the migraines. Refilled.

## 2021-07-09 NOTE — TELEPHONE ENCOUNTER
Pharmacy requesting a  refill of maxalt 10mg.       Medication active on med list yes      Date of last prescription 03/24/21  with 5 refills verified on 07/09/2021    verified by JACK MORENO      Date of last appointment 04/08/2021    Next Visit Date:  7/22/2021

## 2021-07-14 DIAGNOSIS — E78.5 DYSLIPIDEMIA: ICD-10-CM

## 2021-07-14 DIAGNOSIS — Z12.5 PROSTATE CANCER SCREENING: ICD-10-CM

## 2021-07-16 DIAGNOSIS — E78.5 DYSLIPIDEMIA: ICD-10-CM

## 2021-07-19 RX ORDER — ATORVASTATIN CALCIUM 20 MG/1
TABLET, FILM COATED ORAL
Qty: 90 TABLET | Refills: 1 | Status: SHIPPED | OUTPATIENT
Start: 2021-07-19 | End: 2022-03-17

## 2021-07-19 NOTE — TELEPHONE ENCOUNTER
Andres Nicole is calling to request a refill on the following medication(s):    Medication Request:  Requested Prescriptions     Pending Prescriptions Disp Refills    atorvastatin (LIPITOR) 20 MG tablet [Pharmacy Med Name: ATORVASTATIN TAB 20MG] 90 tablet 1     Sig: TAKE 1 TABLET DAILY       Last Visit Date (If Applicable):  4/35/7801    Next Visit Date:    10/1/2021

## 2021-07-22 ENCOUNTER — PROCEDURE VISIT (OUTPATIENT)
Dept: NEUROLOGY | Age: 61
End: 2021-07-22
Payer: COMMERCIAL

## 2021-07-22 ENCOUNTER — TELEPHONE (OUTPATIENT)
Dept: NEUROLOGY | Age: 61
End: 2021-07-22

## 2021-07-22 DIAGNOSIS — G43.719 INTRACTABLE CHRONIC MIGRAINE WITHOUT AURA AND WITHOUT STATUS MIGRAINOSUS: Primary | ICD-10-CM

## 2021-07-22 PROCEDURE — 64615 CHEMODENERV MUSC MIGRAINE: CPT | Performed by: NURSE PRACTITIONER

## 2021-07-22 RX ORDER — ONDANSETRON 4 MG/1
4 TABLET, ORALLY DISINTEGRATING ORAL EVERY 8 HOURS PRN
Qty: 30 TABLET | Refills: 2 | Status: SHIPPED | OUTPATIENT
Start: 2021-07-22 | End: 2022-01-13

## 2021-07-22 NOTE — PROGRESS NOTES
[facility]  Offices: [address], [facility], [address] [facility]  [address] [facility], [address] [place], [address] [place] [address] Caterina Frost, [address]  Phone: [phone]  Fax: [phone]     E. Hartford Mantel, MD Coolidge Nageotte, MD Ahmed B. Kathlene Fritter, MD Konrad Pack, MD Drake Simmers, MD Glennda Musty, BRITTANY        Procedure: Chemodenervation CPT Code 57500  Indication for treatment: Chronic migraine (ICD 10 U04.207)  Consent form was signed. Potential risks and benefits for the procedure were explained to the patient. Procedure: 30-gauge half inch needle was used and 200 U vial Botox was prepared with 4ml 0.9% NS.155 units of Botox were used and 45 units of Botox were discarded. Botox was injected at 31 different sites as follows:   Order  Muscle  Units injected    A  Corrugator1  10 units at 2 div sites    B  Procerus  5 Units in 1 site    C  Frontalis¹  20 Units div. 4 sites    D  Temporalis¹  40 Units div 8 site    E  Occipitalis¹  30 Units div. 6 sites    F  Cervical paraspinal muscles¹  20 Units div 4 sites    G  Trapezius¹  30 Units div 6 sites    Total Dose  155 Units div 31 sites    2 Dose distributed bilaterally     Blood loss: Less than 1 cc     Complications: none     Disposition: Post-botox instructions were reviewed and provided to the patient. Patient was advised to seek medical care for any generalized muscle weakness, double vision, ptosis, trouble swallowing, difficulty talking or difficulty breathing. The patient will return in 3 months for subsequent botox treatments.

## 2021-08-12 RX ORDER — ERENUMAB-AOOE 140 MG/ML
INJECTION, SOLUTION SUBCUTANEOUS
Qty: 1 ML | Refills: 5 | Status: SHIPPED | OUTPATIENT
Start: 2021-08-12 | End: 2022-10-06

## 2021-08-12 NOTE — TELEPHONE ENCOUNTER
Pharmacy requesting refill of Aimovig.       Medication active on med list yes      Date of last fill: 1/11/21  with 5 refills verified on 8/12/21  verified by PATRICIA RN      Date of last appointment 4.8.21    Next Visit Date:  10/20/2021

## 2021-08-16 ENCOUNTER — OFFICE VISIT (OUTPATIENT)
Dept: FAMILY MEDICINE CLINIC | Age: 61
End: 2021-08-16
Payer: COMMERCIAL

## 2021-08-16 VITALS
DIASTOLIC BLOOD PRESSURE: 80 MMHG | OXYGEN SATURATION: 96 % | SYSTOLIC BLOOD PRESSURE: 130 MMHG | WEIGHT: 238.8 LBS | HEART RATE: 87 BPM | BODY MASS INDEX: 30.66 KG/M2

## 2021-08-16 DIAGNOSIS — I10 ESSENTIAL HYPERTENSION: ICD-10-CM

## 2021-08-16 DIAGNOSIS — M54.42 ACUTE LEFT-SIDED LOW BACK PAIN WITH LEFT-SIDED SCIATICA: Primary | ICD-10-CM

## 2021-08-16 DIAGNOSIS — M51.16 LUMBAR DISC DISEASE WITH RADICULOPATHY: ICD-10-CM

## 2021-08-16 DIAGNOSIS — E78.5 DYSLIPIDEMIA: ICD-10-CM

## 2021-08-16 DIAGNOSIS — G43.709 CHRONIC MIGRAINE WITHOUT AURA WITHOUT STATUS MIGRAINOSUS, NOT INTRACTABLE: ICD-10-CM

## 2021-08-16 PROBLEM — G43.719 INTRACTABLE CHRONIC MIGRAINE WITHOUT AURA AND WITHOUT STATUS MIGRAINOSUS: Status: RESOLVED | Noted: 2021-07-22 | Resolved: 2021-08-16

## 2021-08-16 PROCEDURE — G8417 CALC BMI ABV UP PARAM F/U: HCPCS | Performed by: FAMILY MEDICINE

## 2021-08-16 PROCEDURE — 96372 THER/PROPH/DIAG INJ SC/IM: CPT | Performed by: FAMILY MEDICINE

## 2021-08-16 PROCEDURE — 1036F TOBACCO NON-USER: CPT | Performed by: FAMILY MEDICINE

## 2021-08-16 PROCEDURE — 3017F COLORECTAL CA SCREEN DOC REV: CPT | Performed by: FAMILY MEDICINE

## 2021-08-16 PROCEDURE — G8427 DOCREV CUR MEDS BY ELIG CLIN: HCPCS | Performed by: FAMILY MEDICINE

## 2021-08-16 PROCEDURE — 99214 OFFICE O/P EST MOD 30 MIN: CPT | Performed by: FAMILY MEDICINE

## 2021-08-16 RX ORDER — OXYCODONE HYDROCHLORIDE AND ACETAMINOPHEN 5; 325 MG/1; MG/1
1 TABLET ORAL EVERY 6 HOURS PRN
Qty: 12 TABLET | Refills: 0 | Status: SHIPPED | OUTPATIENT
Start: 2021-08-16 | End: 2021-08-20

## 2021-08-16 RX ORDER — PREDNISONE 20 MG/1
20 TABLET ORAL 2 TIMES DAILY
Qty: 10 TABLET | Refills: 0 | Status: SHIPPED | OUTPATIENT
Start: 2021-08-16 | End: 2021-08-21

## 2021-08-16 RX ORDER — KETOROLAC TROMETHAMINE 30 MG/ML
30 INJECTION, SOLUTION INTRAMUSCULAR; INTRAVENOUS ONCE
Status: COMPLETED | OUTPATIENT
Start: 2021-08-16 | End: 2021-08-16

## 2021-08-16 RX ADMIN — KETOROLAC TROMETHAMINE 30 MG: 30 INJECTION, SOLUTION INTRAMUSCULAR; INTRAVENOUS at 11:03

## 2021-08-16 ASSESSMENT — ENCOUNTER SYMPTOMS
PHOTOPHOBIA: 1
FACIAL SWEATING: 0
EYE REDNESS: 0
SWOLLEN GLANDS: 0
VISUAL CHANGE: 0
SINUS PRESSURE: 0
NAUSEA: 1
COUGH: 0
EYE WATERING: 0
BLURRED VISION: 0
SORE THROAT: 0
ABDOMINAL PAIN: 0
SCALP TENDERNESS: 0
BACK PAIN: 1
EYE PAIN: 0
ORTHOPNEA: 0
SHORTNESS OF BREATH: 0
VOMITING: 0
RHINORRHEA: 0

## 2021-08-16 ASSESSMENT — PATIENT HEALTH QUESTIONNAIRE - PHQ9
SUM OF ALL RESPONSES TO PHQ9 QUESTIONS 1 & 2: 0
SUM OF ALL RESPONSES TO PHQ QUESTIONS 1-9: 0
1. LITTLE INTEREST OR PLEASURE IN DOING THINGS: 0
2. FEELING DOWN, DEPRESSED OR HOPELESS: 0

## 2021-08-16 NOTE — PROGRESS NOTES
treatments include ACE inhibitors. The current treatment provides significant improvement. There is no history of chronic renal disease. Migraine   This is a chronic problem. The current episode started more than 1 year ago. The problem occurs intermittently. The problem has been gradually improving. The pain is located in the occipital region. The pain quality is similar to prior headaches. The quality of the pain is described as throbbing. The pain is severe. Associated symptoms include back pain, nausea, phonophobia and photophobia. Pertinent negatives include no abdominal pain, abnormal behavior, anorexia, blurred vision, coughing, dizziness, drainage, ear pain, eye pain, eye redness, eye watering, facial sweating, fever, hearing loss, insomnia, loss of balance, muscle aches, neck pain, numbness, rhinorrhea, scalp tenderness, seizures, sinus pressure, sore throat, swollen glands, tingling, tinnitus, visual change, vomiting, weakness or weight loss. He has tried triptans (aimovig, botox) for the symptoms. The treatment provided moderate relief. His past medical history is significant for obesity. Hyperlipidemia  This is a chronic problem. The current episode started more than 1 year ago. The problem is controlled. Recent lipid tests were reviewed and are normal. Exacerbating diseases include obesity. He has no history of chronic renal disease, diabetes, hypothyroidism, liver disease or nephrotic syndrome. Pertinent negatives include no chest pain, focal sensory loss, focal weakness, leg pain, myalgias or shortness of breath. Current antihyperlipidemic treatment includes statins. The current treatment provides significant improvement of lipids. Review of Systems   Review of Systems   Constitutional: Negative. Negative for fever, malaise/fatigue and weight loss. HENT: Negative. Negative for ear pain, hearing loss, rhinorrhea, sinus pressure, sore throat and tinnitus. Eyes: Positive for photophobia. Negative for blurred vision, pain and redness. Respiratory: Negative. Negative for cough and shortness of breath. Cardiovascular: Negative. Negative for chest pain, palpitations, orthopnea and PND. Gastrointestinal: Positive for nausea. Negative for abdominal pain, anorexia and vomiting. Endocrine: Negative. Genitourinary: Negative. Musculoskeletal: Positive for back pain. Negative for myalgias and neck pain. Skin: Negative. Allergic/Immunologic: Negative. Neurological: Negative. Negative for dizziness, tingling, focal weakness, seizures, weakness, numbness, headaches and loss of balance. Hematological: Negative. Psychiatric/Behavioral: Negative. The patient does not have insomnia. All other systems reviewed and are negative. Medications     Current Outpatient Medications   Medication Sig Dispense Refill    AIMOVIG 140 MG/ML SOAJ inject 140 milligram INTO THE SKIN EVERY 30 DAYS 1 mL 5    ondansetron (ZOFRAN ODT) 4 MG disintegrating tablet Take 1 tablet by mouth every 8 hours as needed for Nausea or Vomiting 30 tablet 2    atorvastatin (LIPITOR) 20 MG tablet TAKE 1 TABLET DAILY 90 tablet 1    rizatriptan (MAXALT) 10 MG tablet Take 1 tablet by mouth once as needed for Migraine May repeat in 2 hours if needed 12 tablet 1    lisinopril (PRINIVIL;ZESTRIL) 10 MG tablet Take 1 tablet by mouth daily 30 tablet 5    naproxen (NAPROSYN) 500 MG tablet Take 1 tablet by mouth 2 times daily (with meals) Take with food. 30 tablet 0    amitriptyline (ELAVIL) 25 MG tablet Take 3 tablets by mouth nightly 90 tablet 5    Misc. Devices MISC Incentive spirometer - use BID (Patient not taking: Reported on 4/8/2021) 1 Device 0    butalbital-acetaminophen-caffeine (FIORICET, ESGIC) -40 MG per tablet Take 1 tablet by mouth 2 times daily as needed for Headaches 60 tablet 3     No current facility-administered medications for this visit.        Past History    Past Medical History:   has a past medical history of COVID-19, Diverticular disease of colon, and History of blood clot to lungs during pregnancy. Social History:   reports that he quit smoking about 12 years ago. His smoking use included cigarettes. He has a 23.00 pack-year smoking history. He has never used smokeless tobacco. He reports current alcohol use. He reports that he does not use drugs. Family History:   Family History   Problem Relation Age of Onset    Cancer Mother         Breast CA       Surgical History:   Past Surgical History:   Procedure Laterality Date    COLECTOMY  2/1/15        HERNIA REPAIR  1990's    JOINT REPLACEMENT  2009    L TKA    REVISION COLOSTOMY      SIGMOIDECTOMY  2/1/15    [facility]/ Dr. Mackenzie Paige        Physical Examination      Vitals:  BP (!) 140/90 (Site: Right Upper Arm, Position: Sitting, Cuff Size: Medium Adult)   Pulse 87   Wt 238 lb 12.8 oz (108.3 kg)   SpO2 96%   BMI 30.66 kg/m²     Physical Exam  Vitals and nursing note reviewed. Constitutional:       General: He is not in acute distress. Appearance: Normal appearance. He is obese. He is not ill-appearing, toxic-appearing or diaphoretic. HENT:      Head: Normocephalic and atraumatic. Right Ear: External ear normal.      Left Ear: External ear normal.   Eyes:      General: No scleral icterus. Right eye: No discharge. Left eye: No discharge. Conjunctiva/sclera: Conjunctivae normal.   Cardiovascular:      Rate and Rhythm: Normal rate and regular rhythm. Pulses: Normal pulses. Heart sounds: Normal heart sounds. No murmur heard. No friction rub. No gallop. Pulmonary:      Effort: Pulmonary effort is normal. No respiratory distress. Breath sounds: Normal breath sounds. No stridor. No wheezing, rhonchi or rales. Chest:      Chest wall: No tenderness. Musculoskeletal:      Lumbar back: Spasms and tenderness present.  No swelling, edema, deformity, signs of trauma, lacerations or bony tenderness. Decreased range of motion. Positive right straight leg raise test and positive left straight leg raise test. No scoliosis. Skin:     General: Skin is warm. Coloration: Skin is not jaundiced or pale. Neurological:      Mental Status: He is alert and oriented to person, place, and time. Mental status is at baseline. Psychiatric:         Mood and Affect: Mood normal.         Behavior: Behavior normal.         Thought Content: Thought content normal.         Judgment: Judgment normal.         Labs/Imaging/Diagnostics   Labs:  No results found for: CMPWITHGFR, CBCAUTODIF, TSHFT4, LABA1C, LIPIDPAN    Imaging Last 24 Hours:  XR FOOT LEFT (MIN 3 VIEWS)  Narrative: 3/26/2021 FINDINGS:  Three weightbearing views (AP, Mortise, and Lateral)   of the left ankle and three weightbearing views (AP, Oblique, Lateral) of   the left foot were obtained in the office today and reviewed, revealing no   acute fracture, dislocation, or radioopaque foreign body/tumor. Degenerative changes of tibiotalar joint with joint narrowing, sclerosis,   and osteophytes. Retained hardware noted in the distal tibia in both   medially and laterally.     Impression:   No acute fracture/dislocation. Degenerative changes with   retained hardware as above.      Electronically signed by Tone Allen MD  XR ANKLE LEFT (MIN 3 VIEWS)  Narrative: 3/26/2021 FINDINGS:  Three weightbearing views (AP, Mortise, and Lateral)   of the left ankle and three weightbearing views (AP, Oblique, Lateral) of   the left foot were obtained in the office today and reviewed, revealing no   acute fracture, dislocation, or radioopaque foreign body/tumor. Degenerative changes of tibiotalar joint with joint narrowing, sclerosis,   and osteophytes. Retained hardware noted in the distal tibia in both   medially and laterally.     Impression:   No acute fracture/dislocation. Degenerative changes with   retained hardware as above.    Electronically signed by Yane Brannon MD

## 2021-08-16 NOTE — PATIENT INSTRUCTIONS
Patient Education        Back Stretches: Exercises  Introduction  Here are some examples of exercises for stretching your back. Start each exercise slowly. Ease off the exercise if you start to have pain. Your doctor or physical therapist will tell you when you can start these exercises and which ones will work best for you. How to do the exercises  Overhead stretch   1. Stand comfortably with your feet shoulder-width apart. 2. Looking straight ahead, raise both arms over your head and reach toward the ceiling. Do not allow your head to tilt back. 3. Hold for 15 to 30 seconds, then lower your arms to your sides. 4. Repeat 2 to 4 times. Side stretch   1. Stand comfortably with your feet shoulder-width apart. 2. Raise one arm over your head, and then lean to the other side. 3. Slide your hand down your leg as you let the weight of your arm gently stretch your side muscles. Hold for 15 to 30 seconds. 4. Repeat 2 to 4 times on each side. Press-up   1. Lie on your stomach, supporting your body with your forearms. 2. Press your elbows down into the floor to raise your upper back. As you do this, relax your stomach muscles and allow your back to arch without using your back muscles. As your press up, do not let your hips or pelvis come off the floor. 3. Hold for 15 to 30 seconds, then relax. 4. Repeat 2 to 4 times. Relax and rest   1. Lie on your back with a rolled towel under your neck and a pillow under your knees. Extend your arms comfortably to your sides. 2. Relax and breathe normally. 3. Remain in this position for about 10 minutes. 4. If you can, do this 2 or 3 times each day. Follow-up care is a key part of your treatment and safety. Be sure to make and go to all appointments, and call your doctor if you are having problems. It's also a good idea to know your test results and keep a list of the medicines you take. Where can you learn more? Go to [email or web address]://elda.[facility]. org and sign in to your [employer] account. Enter Z475 in the [facility] box to learn more about \"Back Stretches: Exercises. \"     If you do not have an account, please click on the \"Sign Up Now\" link. Current as of: November 16, 2020               Content Version: 12.9  © 0477-2389 [employer]. Care instructions adapted under license by [facility] ([facility]). If you have questions about a medical condition or this instruction, always ask your healthcare professional. [address] any warranty or liability for your use of this information.

## 2021-08-20 DIAGNOSIS — M54.42 ACUTE LEFT-SIDED LOW BACK PAIN WITH LEFT-SIDED SCIATICA: ICD-10-CM

## 2021-08-20 DIAGNOSIS — M51.16 LUMBAR DISC DISEASE WITH RADICULOPATHY: ICD-10-CM

## 2021-08-20 RX ORDER — OXYCODONE HYDROCHLORIDE AND ACETAMINOPHEN 5; 325 MG/1; MG/1
TABLET ORAL
Qty: 12 TABLET | Refills: 0 | Status: SHIPPED | OUTPATIENT
Start: 2021-08-20 | End: 2021-08-23

## 2021-08-20 NOTE — TELEPHONE ENCOUNTER
Alice Villalobos is calling to request a refill on the following medication(s):    Medication Request:  Requested Prescriptions     Pending Prescriptions Disp Refills    oxyCODONE-acetaminophen (PERCOCET) 5-325 MG per tablet [Pharmacy Med Name: OXYCODONE-ACETAMINOPHEN 5-325] 12 tablet      Sig: take 1 tablet by mouth every 6 hours if needed for pain TAKE THE LOWEST DOSE POSSIBLE TO MANAGE PAIN       Last Visit Date (If Applicable):  0/45/8531    Next Visit Date:    10/1/2021

## 2021-08-27 ENCOUNTER — TELEPHONE (OUTPATIENT)
Dept: FAMILY MEDICINE CLINIC | Age: 61
End: 2021-08-27

## 2021-08-27 NOTE — TELEPHONE ENCOUNTER
----- Message from Miah Marquez sent at 8/27/2021 10:00 AM EDT -----  Subject: Message to Provider    QUESTIONS  Information for Provider? Pt is returning a missed call from Isidoro Looney,   called the office, no ans. Pt is boarding a plane and will be available in   3 hrs, pls give pt a call.  ---------------------------------------------------------------------------  --------------  CALL BACK INFO  What is the best way for the office to contact you? OK to leave message on   voicemail  Preferred Call Back Phone Number? [phone]  ---------------------------------------------------------------------------  --------------  SCRIPT ANSWERS  Relationship to Patient?  Self

## 2021-08-27 NOTE — TELEPHONE ENCOUNTER
Tried to call patient to see if he started PT and had X-ray from when it was ordered in April. Went to [place] [phone] to cb    Patient's MRI was denied - patient needs to complete therapy and have other testing (X-ray, CT etc sometimes they will do this without the CT) before an MRI will be approved through insurance.

## 2021-08-27 NOTE — TELEPHONE ENCOUNTER
----- Message from [facility] Jeannette sent at 8/27/2021 10:00 AM EDT -----  Subject: Message to Provider    QUESTIONS  Information for Provider? Pt is returning a missed call from Matilda STILL,   called the office, no ans. Pt is boarding a plane and will be available in   3 hrs, pls give pt a call.  ---------------------------------------------------------------------------  --------------  CALL BACK INFO  What is the best way for the office to contact you? OK to leave message on   voicemail  Preferred Call Back Phone Number? [phone]  ---------------------------------------------------------------------------  --------------  SCRIPT ANSWERS  Relationship to Patient?  Self

## 2021-08-31 ENCOUNTER — TELEPHONE (OUTPATIENT)
Dept: FAMILY MEDICINE CLINIC | Age: 61
End: 2021-08-31

## 2021-09-01 DIAGNOSIS — M54.42 ACUTE LEFT-SIDED LOW BACK PAIN WITH LEFT-SIDED SCIATICA: ICD-10-CM

## 2021-09-01 DIAGNOSIS — M51.16 LUMBAR DISC DISEASE WITH RADICULOPATHY: Primary | ICD-10-CM

## 2021-09-02 ASSESSMENT — ENCOUNTER SYMPTOMS
ALLERGIC/IMMUNOLOGIC NEGATIVE: 1
RESPIRATORY NEGATIVE: 1

## 2021-09-07 NOTE — TELEPHONE ENCOUNTER
Pharmacy requesting refill of rizatriptan.       Medication active on med list yes      Date of last fill: 7/9/21  with 1 refills verified on 9/7/21  verified by PATRICIA RN      Date of last appointment 7/22/21 Botox    Next Visit Date:  10/20/2021

## 2021-09-08 RX ORDER — RIZATRIPTAN BENZOATE 10 MG/1
TABLET ORAL
Qty: 12 TABLET | Refills: 1 | Status: SHIPPED | OUTPATIENT
Start: 2021-09-08 | End: 2021-11-04 | Stop reason: SDUPTHER

## 2021-11-01 ENCOUNTER — OFFICE VISIT (OUTPATIENT)
Dept: FAMILY MEDICINE CLINIC | Age: 61
End: 2021-11-01
Payer: COMMERCIAL

## 2021-11-01 VITALS
DIASTOLIC BLOOD PRESSURE: 70 MMHG | SYSTOLIC BLOOD PRESSURE: 130 MMHG | BODY MASS INDEX: 30.56 KG/M2 | WEIGHT: 238 LBS | OXYGEN SATURATION: 96 % | HEART RATE: 101 BPM

## 2021-11-01 DIAGNOSIS — M51.16 LUMBAR DISC DISEASE WITH RADICULOPATHY: ICD-10-CM

## 2021-11-01 DIAGNOSIS — E78.5 DYSLIPIDEMIA: ICD-10-CM

## 2021-11-01 DIAGNOSIS — G43.709 CHRONIC MIGRAINE WITHOUT AURA WITHOUT STATUS MIGRAINOSUS, NOT INTRACTABLE: ICD-10-CM

## 2021-11-01 DIAGNOSIS — I10 ESSENTIAL HYPERTENSION: Primary | ICD-10-CM

## 2021-11-01 DIAGNOSIS — K43.9 VENTRAL HERNIA WITHOUT OBSTRUCTION OR GANGRENE: ICD-10-CM

## 2021-11-01 DIAGNOSIS — R10.13 EPIGASTRIC PAIN: ICD-10-CM

## 2021-11-01 PROCEDURE — 3017F COLORECTAL CA SCREEN DOC REV: CPT | Performed by: FAMILY MEDICINE

## 2021-11-01 PROCEDURE — 1036F TOBACCO NON-USER: CPT | Performed by: FAMILY MEDICINE

## 2021-11-01 PROCEDURE — G8427 DOCREV CUR MEDS BY ELIG CLIN: HCPCS | Performed by: FAMILY MEDICINE

## 2021-11-01 PROCEDURE — G8417 CALC BMI ABV UP PARAM F/U: HCPCS | Performed by: FAMILY MEDICINE

## 2021-11-01 PROCEDURE — G8484 FLU IMMUNIZE NO ADMIN: HCPCS | Performed by: FAMILY MEDICINE

## 2021-11-01 PROCEDURE — 99214 OFFICE O/P EST MOD 30 MIN: CPT | Performed by: FAMILY MEDICINE

## 2021-11-01 ASSESSMENT — ENCOUNTER SYMPTOMS
COUGH: 0
SWOLLEN GLANDS: 0
RHINORRHEA: 0
PHOTOPHOBIA: 1
SINUS PRESSURE: 0
VOMITING: 0
FACIAL SWEATING: 0
ORTHOPNEA: 0
SHORTNESS OF BREATH: 0
RESPIRATORY NEGATIVE: 1
ALLERGIC/IMMUNOLOGIC NEGATIVE: 1
NAUSEA: 1
BLURRED VISION: 0
BACK PAIN: 1
EYE PAIN: 0
SCALP TENDERNESS: 0
SORE THROAT: 0
VISUAL CHANGE: 0
EYE WATERING: 0
EYE REDNESS: 0
ABDOMINAL PAIN: 0

## 2021-11-01 ASSESSMENT — PATIENT HEALTH QUESTIONNAIRE - PHQ9
1. LITTLE INTEREST OR PLEASURE IN DOING THINGS: 0
SUM OF ALL RESPONSES TO PHQ QUESTIONS 1-9: 0
SUM OF ALL RESPONSES TO PHQ QUESTIONS 1-9: 0
SUM OF ALL RESPONSES TO PHQ9 QUESTIONS 1 & 2: 0
2. FEELING DOWN, DEPRESSED OR HOPELESS: 0
SUM OF ALL RESPONSES TO PHQ QUESTIONS 1-9: 0

## 2021-11-01 NOTE — PATIENT INSTRUCTIONS
Patient Education        Abdominal Pain: Care Instructions  Your Care Instructions     Abdominal pain has many possible causes. Some aren't serious and get better on their own in a few days. Others need more testing and treatment. If your pain continues or gets worse, you need to be rechecked and may need more tests to find out what is wrong. You may need surgery to correct the problem. Don't ignore new symptoms, such as fever, nausea and vomiting, urination problems, pain that gets worse, and dizziness. These may be signs of a more serious problem. Your doctor may have recommended a follow-up visit in the next 8 to 12 hours. If you are not getting better, you may need more tests or treatment. The doctor has checked you carefully, but problems can develop later. If you notice any problems or new symptoms, get medical treatment right away. Follow-up care is a key part of your treatment and safety. Be sure to make and go to all appointments, and call your doctor if you are having problems. It's also a good idea to know your test results and keep a list of the medicines you take. How can you care for yourself at home? · Rest until you feel better. · To prevent dehydration, drink plenty of fluids. Choose water and other clear liquids until you feel better. If you have kidney, heart, or liver disease and have to limit fluids, talk with your doctor before you increase the amount of fluids you drink. · If your stomach is upset, eat mild foods, such as rice, dry toast or crackers, bananas, and applesauce. Try eating several small meals instead of two or three large ones. · Wait until 48 hours after all symptoms have gone away before you have spicy foods, alcohol, and drinks that contain caffeine. · Do not eat foods that are high in fat. · Avoid anti-inflammatory medicines such as aspirin, ibuprofen (Advil, Motrin), and naproxen (Aleve). These can cause stomach upset.  Talk to your doctor if you take daily aspirin

## 2021-11-01 NOTE — PROGRESS NOTES
[facility] Progress Note    Date:11/1/2021         Patient Nixon Sims     YOB: 1960     Age:61 y.o. Assessment/Plan        Problem List Items Addressed This Visit        Circulatory    Essential hypertension - Primary      Borderline controlled, continue current medications, continue current treatment plan, medication adherence emphasized and lifestyle modifications recommended            Nervous and Auditory    Chronic migraine without aura without status migrainosus, not intractable      Monitored by specialist- no acute findings meriting change in the plan         Lumbar disc disease with radiculopathy      Continue current treatment including PT   Improving slightly            Other    Dyslipidemia      Well-controlled, continue current medications and continue current treatment plan           Other Visit Diagnoses     Ventral hernia without obstruction or gangrene        Previous abdominal surgery    Relevant Orders    CT ABDOMEN PELVIS W WO CONTRAST Additional Contrast? Radiologist Recommendation    Epigastric pain        Relevant Orders    CT ABDOMEN PELVIS W WO CONTRAST Additional Contrast? Radiologist Recommendation           Return in about 3 months (around 2/1/2022), or if symptoms worsen or fail to improve. Electronically signed by Darren Riley DO on 11/1/21         Subjective     Lucretia Hector is a 63 y.o. male presenting today for   Chief Complaint   Patient presents with    3 Month Follow-Up   . HERNIA:  Lucretia Hector is an 63 y.o. male who was referred for evaluation of a  ventral hernia. Symptoms were first noted several days ago. Symptoms or injury did not occur at work. Pain is dull, intermittent and is reducible. The patient has no symptoms of  chronic constipation, chronic cough, difficulty urinating. There has previous hx of abdominal surgery.   Was moving heavy stuff this past weekend    Back Pain  Pertinent negatives include no abdominal pain, chest pain, fever, headaches, leg pain, numbness, tingling, weakness or weight loss. Hypertension  This is a new problem. The current episode started more than 1 month ago. The problem has been gradually worsening since onset. The problem is controlled. Pertinent negatives include no anxiety, blurred vision, chest pain, headaches, malaise/fatigue, neck pain, orthopnea, palpitations, peripheral edema, PND, shortness of breath or sweats. Past treatments include ACE inhibitors. The current treatment provides significant improvement. There is no history of chronic renal disease. Migraine   This is a chronic problem. The current episode started more than 1 year ago. The problem occurs intermittently. The problem has been gradually improving. The pain is located in the occipital region. The pain quality is similar to prior headaches. The quality of the pain is described as throbbing. The pain is severe. Associated symptoms include back pain, nausea, phonophobia and photophobia. Pertinent negatives include no abdominal pain, abnormal behavior, anorexia, blurred vision, coughing, dizziness, drainage, ear pain, eye pain, eye redness, eye watering, facial sweating, fever, hearing loss, insomnia, loss of balance, muscle aches, neck pain, numbness, rhinorrhea, scalp tenderness, seizures, sinus pressure, sore throat, swollen glands, tingling, tinnitus, visual change, vomiting, weakness or weight loss. He has tried triptans (aimovig, botox) for the symptoms. The treatment provided moderate relief. His past medical history is significant for obesity. Hyperlipidemia  This is a chronic problem. The current episode started more than 1 year ago. The problem is controlled. Recent lipid tests were reviewed and are normal. Exacerbating diseases include obesity. He has no history of chronic renal disease, diabetes, hypothyroidism, liver disease or nephrotic syndrome.  Pertinent negatives include no chest pain, focal sensory loss, focal weakness, leg pain, myalgias or shortness of breath. Current antihyperlipidemic treatment includes statins. The current treatment provides significant improvement of lipids. Review of Systems   Review of Systems   Constitutional: Negative. Negative for fever, malaise/fatigue and weight loss. HENT: Negative. Negative for ear pain, hearing loss, rhinorrhea, sinus pressure, sore throat and tinnitus. Eyes: Positive for photophobia. Negative for blurred vision, pain and redness. Respiratory: Negative. Negative for cough and shortness of breath. Cardiovascular: Negative. Negative for chest pain, palpitations, orthopnea and PND. Gastrointestinal: Positive for nausea. Negative for abdominal pain, anorexia and vomiting. Endocrine: Negative. Genitourinary: Negative. Musculoskeletal: Positive for back pain. Negative for myalgias and neck pain. Skin: Negative. Allergic/Immunologic: Negative. Neurological: Negative. Negative for dizziness, tingling, focal weakness, seizures, weakness, numbness, headaches and loss of balance. Hematological: Negative. Psychiatric/Behavioral: Negative. The patient does not have insomnia. All other systems reviewed and are negative. Medications     Current Outpatient Medications   Medication Sig Dispense Refill    rizatriptan (MAXALT) 10 MG tablet take 1 tablet by mouth ONCE if needed for migraines may repeat in 2 hours if needed 12 tablet 1    AIMOVIG 140 MG/ML SOAJ inject 140 milligram INTO THE SKIN EVERY 30 DAYS 1 mL 5    ondansetron (ZOFRAN ODT) 4 MG disintegrating tablet Take 1 tablet by mouth every 8 hours as needed for Nausea or Vomiting 30 tablet 2    atorvastatin (LIPITOR) 20 MG tablet TAKE 1 TABLET DAILY 90 tablet 1    lisinopril (PRINIVIL;ZESTRIL) 10 MG tablet Take 1 tablet by mouth daily 30 tablet 5    naproxen (NAPROSYN) 500 MG tablet Take 1 tablet by mouth 2 times daily (with meals) Take with food.  30 tablet 0    amitriptyline (ELAVIL) 25 MG tablet Take 3 tablets by mouth nightly 90 tablet 5    Misc. Devices MISC Incentive spirometer - use BID 1 Device 0    butalbital-acetaminophen-caffeine (FIORICET, ESGIC) -40 MG per tablet Take 1 tablet by mouth 2 times daily as needed for Headaches 60 tablet 3     No current facility-administered medications for this visit. Past History    Past Medical History:   has a past medical history of COVID-19, Diverticular disease of colon, and History of blood clot to lungs during pregnancy. Social History:   reports that he quit smoking about 13 years ago. His smoking use included cigarettes. He has a 23.00 pack-year smoking history. He has never used smokeless tobacco. He reports current alcohol use. He reports that he does not use drugs. Family History:   Family History   Problem Relation Age of Onset    Cancer Mother         Breast CA       Surgical History:   Past Surgical History:   Procedure Laterality Date    COLECTOMY  2/1/15        HERNIA REPAIR  1990's    JOINT REPLACEMENT  2009    L TKA    REVISION COLOSTOMY      SIGMOIDECTOMY  2/1/15    [facility]/ Dr. Michael Carreon        Physical Examination      Vitals:  /70 (Site: Right Upper Arm, Position: Sitting, Cuff Size: Medium Adult)   Pulse 101   Wt 238 lb (108 kg)   SpO2 96%   BMI 30.56 kg/m²     Physical Exam  Vitals and nursing note reviewed. Constitutional:       General: He is not in acute distress. Appearance: Normal appearance. He is obese. He is not ill-appearing, toxic-appearing or diaphoretic. HENT:      Head: Normocephalic and atraumatic. Right Ear: External ear normal.      Left Ear: External ear normal.   Eyes:      General: No scleral icterus. Right eye: No discharge. Left eye: No discharge. Conjunctiva/sclera: Conjunctivae normal.   Cardiovascular:      Rate and Rhythm: Normal rate and regular rhythm. Pulses: Normal pulses.       Heart sounds: Normal heart sounds. No murmur heard. No friction rub. No gallop. Pulmonary:      Effort: Pulmonary effort is normal. No respiratory distress. Breath sounds: Normal breath sounds. No stridor. No wheezing, rhonchi or rales. Chest:      Chest wall: No tenderness. Abdominal:      General: Abdomen is protuberant. Bowel sounds are normal. There is no distension or abdominal bruit. There are no signs of injury. Palpations: Abdomen is soft. There is no shifting dullness, fluid wave, hepatomegaly, splenomegaly or pulsatile mass. Tenderness: There is abdominal tenderness in the epigastric area. Hernia: A hernia is present. Hernia is present in the ventral area. Skin:     General: Skin is warm. Coloration: Skin is not jaundiced or pale. Neurological:      Mental Status: He is alert and oriented to person, place, and time. Mental status is at baseline. Psychiatric:         Mood and Affect: Mood normal.         Behavior: Behavior normal.         Thought Content: Thought content normal.         Judgment: Judgment normal.         Labs/Imaging/Diagnostics   Labs:  No results found for: CMPWITHGFR, CBCAUTODIF, TSHFT4, LABA1C, LIPIDPAN    Imaging Last 24 Hours:  XR FOOT LEFT (MIN 3 VIEWS)  Narrative: 3/26/2021 FINDINGS:  Three weightbearing views (AP, Mortise, and Lateral)   of the left ankle and three weightbearing views (AP, Oblique, Lateral) of   the left foot were obtained in the office today and reviewed, revealing no   acute fracture, dislocation, or radioopaque foreign body/tumor. Degenerative changes of tibiotalar joint with joint narrowing, sclerosis,   and osteophytes. Retained hardware noted in the distal tibia in both   medially and laterally.     Impression:   No acute fracture/dislocation.  Degenerative changes with   retained hardware as above.      Electronically signed by Eliza Deng MD  XR ANKLE LEFT (MIN 3 VIEWS)  Narrative: 3/26/2021 FINDINGS:  Three weightbearing views (AP, Mortise, and Lateral)   of the left ankle and three weightbearing views (AP, Oblique, Lateral) of   the left foot were obtained in the office today and reviewed, revealing no   acute fracture, dislocation, or radioopaque foreign body/tumor. Degenerative changes of tibiotalar joint with joint narrowing, sclerosis,   and osteophytes. Retained hardware noted in the distal tibia in both   medially and laterally.     Impression:   No acute fracture/dislocation.  Degenerative changes with   retained hardware as above.      Electronically signed by Waqas Bro MD

## 2021-11-04 ENCOUNTER — PROCEDURE VISIT (OUTPATIENT)
Dept: NEUROLOGY | Age: 61
End: 2021-11-04
Payer: COMMERCIAL

## 2021-11-04 ENCOUNTER — TELEPHONE (OUTPATIENT)
Dept: NEUROLOGY | Age: 61
End: 2021-11-04

## 2021-11-04 DIAGNOSIS — G43.719 INTRACTABLE CHRONIC MIGRAINE WITHOUT AURA AND WITHOUT STATUS MIGRAINOSUS: Primary | ICD-10-CM

## 2021-11-04 PROBLEM — G43.709 CHRONIC MIGRAINE WITHOUT AURA WITHOUT STATUS MIGRAINOSUS, NOT INTRACTABLE: Status: RESOLVED | Noted: 2020-11-12 | Resolved: 2021-11-04

## 2021-11-04 PROCEDURE — 3017F COLORECTAL CA SCREEN DOC REV: CPT | Performed by: NURSE PRACTITIONER

## 2021-11-04 PROCEDURE — 1036F TOBACCO NON-USER: CPT | Performed by: NURSE PRACTITIONER

## 2021-11-04 PROCEDURE — G8484 FLU IMMUNIZE NO ADMIN: HCPCS | Performed by: NURSE PRACTITIONER

## 2021-11-04 PROCEDURE — 99213 OFFICE O/P EST LOW 20 MIN: CPT | Performed by: NURSE PRACTITIONER

## 2021-11-04 PROCEDURE — G8417 CALC BMI ABV UP PARAM F/U: HCPCS | Performed by: NURSE PRACTITIONER

## 2021-11-04 PROCEDURE — G8428 CUR MEDS NOT DOCUMENT: HCPCS | Performed by: NURSE PRACTITIONER

## 2021-11-04 PROCEDURE — 64615 CHEMODENERV MUSC MIGRAINE: CPT | Performed by: NURSE PRACTITIONER

## 2021-11-04 RX ORDER — PREDNISONE 20 MG/1
TABLET ORAL
Qty: 18 TABLET | Refills: 0 | Status: SHIPPED | OUTPATIENT
Start: 2021-11-04 | End: 2022-01-13

## 2021-11-04 RX ORDER — RIMEGEPANT SULFATE 75 MG/75MG
TABLET, ORALLY DISINTEGRATING ORAL
Qty: 15 TABLET | Refills: 5 | Status: SHIPPED | OUTPATIENT
Start: 2021-11-04

## 2021-11-04 RX ORDER — RIZATRIPTAN BENZOATE 10 MG/1
TABLET ORAL
Qty: 12 TABLET | Refills: 5 | Status: SHIPPED | OUTPATIENT
Start: 2021-11-04 | End: 2022-04-18

## 2021-11-04 NOTE — TELEPHONE ENCOUNTER
Norman Castle called in. He siad he called his insurance and [employer] about his outstanding balance. He said his insurance company told him that the April visit was billed and was paid at 100 %. He said the July visit was billed a different way so it was only paid at 80%. He said one was billed Outpt. and the other billed as surgical.     Secondly [employer] is sending a check in a week to our office for the outstanding balance.

## 2021-11-04 NOTE — PROGRESS NOTES
[facility]            [address]          [facility], [address] [facility]          Dept: [phone]          Dept Fax: [phone]    MD Antonella Terrell MD Ahmed B. Olan Gaskin, MD Berdie Hicks, MD Basilia Peasant, CNP            11/4/2021      HISTORY OF PRESENT ILLNESS:       I had the pleasure of seeing Demetrice King, who returns for continuing neurologic care. The patient was seen last on July 22, 2021 for treatment of chronic intractable migraine headaches with daily headaches. For prophylaxis of his migraine and daily headaches he is prescribed amitriptyline 75 mg at bedtime daily and botox injections. For abortive therapy he is prescribed maxalt 10 mg and fioricet. The patient has received two botox injections since his last visit and is here today reporting that he recently had a headache this past weekend that lasted for three days that was a severe 10/10 intensity. He notes that since then he has not had a headache. He notes that he has been having daily headaches prior to today's visit that are of a lessened intensity and easily aborted with maxalt. He denies any side effects to botox injections. Headache location: neck and back of the head  Headache quality: pressure  Associated factors:  nausea, cold sweat  Intensity: 10/10  Headache chronicity: 2 years  Headache frequency: daily  Aggravating factors : laying down, neck pain, bright lights  Relieving factors: Imitrex  Prophylactic medications:amitriptyline, botox  Abortive medications: Butalbital, Maxalt  Previously used medications: Topamax, Fioricet    Testing reviewed:    MRI Brain WO contrast (12/24/2020):   Impression   No acute intracranial abnormality.       Scattered foci of hyperintense signal mildly within the periventricular and   subcortical white matter of both cerebral hemispheres.  These changes likely   relate to either changes related to migraines versus chronic microvascular   disease.                PAST MEDICAL HISTORY:         Diagnosis Date    COVID-    Diverticular disease of colon     History of blood clot to lungs during pregnancy         PAST SURGICAL HISTORY:         Procedure Laterality Date    COLECTOMY  2/1/15        HERNIA REPAIR      JOINT REPLACEMENT  2009    L TKA    REVISION COLOSTOMY      SIGMOIDECTOMY  2/1/15    [facility]/ Dr. Jenny Mccormack HISTORY:     Social History     Socioeconomic History    Marital status:      Spouse name: Not on file    Number of children: Not on file    Years of education: Not on file    Highest education level: Not on file   Occupational History    Not on file   Tobacco Use    Smoking status: Former Smoker     Packs/day: 1.00     Years: 23.00     Pack years: 23.00     Types: Cigarettes     Quit date: 10/1/2008     Years since quittin.1    Smokeless tobacco: Never Used   Vaping Use    Vaping Use: Never used   Substance and Sexual Activity    Alcohol use: Yes     Comment: few days a week    Drug use: Never    Sexual activity: Not on file   Other Topics Concern    Not on file   Social History Narrative    ** Merged History Encounter **          Social Determinants of Health     Financial Resource Strain: Low Risk     Difficulty of Paying Living Expenses: Not hard at all   Food Insecurity: No Food Insecurity    Worried About [address] [place] [address] in the Last Year: Never true    Rob of Food in the Last Year: Never true   Transportation Needs:     Lack of Transportation (Medical):      Lack of Transportation (Non-Medical):    Physical Activity:     Days of Exercise per Week:     Minutes of Exercise per Session:    Stress:     Feeling of Stress :    Social Connections:     Frequency of Communication with Friends and Family:     Frequency of Social Gatherings with Friends and Family:     Attends Protestant Services:     Active Member of Clubs or Organizations:     Attends Club or Organization Meetings:     Marital Status:    Intimate Partner Violence:     Fear of Current or Ex-Partner:     Emotionally Abused:     Physically Abused:     Sexually Abused:        CURRENT MEDICATIONS:     Current Outpatient Medications   Medication Sig Dispense Refill    rizatriptan (MAXALT) 10 MG tablet take 1 tablet by mouth ONCE if needed for migraines may repeat in 2 hours if needed 12 tablet 1    AIMOVIG 140 MG/ML SOAJ inject 140 milligram INTO THE SKIN EVERY 30 DAYS 1 mL 5    ondansetron (ZOFRAN ODT) 4 MG disintegrating tablet Take 1 tablet by mouth every 8 hours as needed for Nausea or Vomiting 30 tablet 2    atorvastatin (LIPITOR) 20 MG tablet TAKE 1 TABLET DAILY 90 tablet 1    lisinopril (PRINIVIL;ZESTRIL) 10 MG tablet Take 1 tablet by mouth daily 30 tablet 5    naproxen (NAPROSYN) 500 MG tablet Take 1 tablet by mouth 2 times daily (with meals) Take with food. 30 tablet 0    amitriptyline (ELAVIL) 25 MG tablet Take 3 tablets by mouth nightly 90 tablet 5    Misc. Devices MISC Incentive spirometer - use BID 1 Device 0    butalbital-acetaminophen-caffeine (FIORICET, ESGIC) -40 MG per tablet Take 1 tablet by mouth 2 times daily as needed for Headaches 60 tablet 3     No current facility-administered medications for this visit. ALLERGIES:   No Known Allergies                              REVIEW OF SYSTEMS        All items selected indicate a positive finding. Those items not selected are negative.   Constitutional [] Weight loss/gain   [] Fatigue  [] Fever/Chills   HEENT [] Hearing Loss  [] Visual Disturbance  [] Tinnitus  [] Eye pain   Respiratory [] Shortness of Breath  [] Cough  [] Snoring   Cardiovascular [] Chest Pain  [] Palpitations  [] Lightheaded   GI [] Constipation  [] Diarrhea  [] Swallowing change  [x] Nausea/vomiting    [] Urinary Frequency  [] Urinary Urgency   Musculoskeletal [] Neck pain  [] Back pain  [] Muscle pain  [] Restless legs   Dermatologic [] Skin changes   Neurologic [] Memory loss/confusion  [] Seizures  [] Trouble walking or imbalance  [] Dizziness  [] Sleep disturbance  [] Weakness  [] Numbness  [] Tremors  [] Speech Difficulty  [x] Headaches  [x] Light Sensitivity  [x] Sound Sensitivity   Endocrinology []Excessive thirst  []Excessive hunger   Psychiatric [] Anxiety/Depression  [] Hallucination   Allergy/immunology []Hives/environmental allergies   Hematologic/lymph [] Abnormal bleeding  [] Abnormal bruising         PHYSICAL EXAMINATION:                                                .                                                                                                    General Appearance:  Alert, cooperative, no signs of distress, appears stated age   Head:  Normocephalic, no signs of trauma   Eyes:  Conjunctiva/corneas clear;  eyelids intact   Ears:  Normal external ear and canals   Nose: Nares normal, mucosa normal, no drainage    Throat: Lips and tongue normal; teeth normal;  gums normal   Neck: Supple, intact flexion, extension and rotation;   trachea midline;  no adenopathy;   thyroid: not enlarged;   no carotid pulse abnormality   Back:   Symmetric, no curvature, ROM adequate   Lungs:   Respirations unlabored   Heart:  Regular rate and rhythm           Extremities: Extremities normal, no cyanosis, no edema   Pulses: Symmetric over head and neck   Skin: Skin color, texture normal, no rashes, no lesions                                     NEUROLOGIC EXAMINATION    Neurologic Exam  Mental status    Alert and oriented x 3; intact memory with no confusion, speech or language problems; no hallucinations or delusions  Fund of information appropriate for level of education    Cranial nerves    II - visual fields intact to confrontation bilaterally  III, IV, VI - extra-ocular muscles full: no pupillary defect; no MAGGY, no nystagmus, no ptosis   V - normal facial sensation VII - normal facial symmetry                                                             VIII - intact hearing                                                                             IX, X - symmetrical palate                                                                  XI - symmetrical shoulder shrug                                                       XII - tongue midline without atrophy or fasciculation      Motor function  Normal muscle bulk and tone; strength 5/5 on all 4 extremities, no pronator drift      Sensory function Intact to light touch, pinprick, vibration, proprioception on all 4 extremities      Cerebellar Intact fine motor movement. No involuntary movements or tremors. No ataxia or dysmetria on finger to nose or heel to shin testing      Reflex function DTR 2+ on bilateral UE and LE, symmetric. Down going toes bilaterally      Gait                   normal base and arm swing                  Medical Decision Making: In summary, your patient, Tisha Cervantes exhibits the following, with associated plan:    1. Chronic intractable migraine headaches, currently getting a daily headache. Medication trials include topamax, amitriptyline and aimovig   1. Continue Maxalt 10 mg for abortive therapy  2. Continue Amitriptyline 75 mg at bed time daily  3. Continue botox injections  4. Start nurtec every other day for prophylaxis  5. Continue Fioricet for rescue therapy              Signed: Juan Nuñez CNP      *Please note that portions of this note were completed with a voice recognition program.  Although every effort was made to insure the accuracy of this automated transcription, some errors in transcription may have occurred, occasionally words and are mis-transcribed    Provider Attestation: The documentation recorded by the scribe accurately reflects the service I personally performed and the decisions made by myself.  Portions of this note were transcribed by a scribe. I personally performed the history, physical exam, and medical decision-making and confirm the accuracy of the information in the transcribed note. Scribe Attestation:   By signing my name below, Jaye Ponce, attest that this documentation has been prepared under the direction and in the presence of Abiodun Shea CNP.

## 2021-11-04 NOTE — PROGRESS NOTES
[facility]  Offices: [address], [place], [address] [facility]  [address] [facility], [address], [place], [address] [facility]  [address] [place] [address] [address]  Phone: [phone]  Fax: [phone]     MD Sakina Toro, MD Amy Rushing MD Dallie Provost, MD Maikel Winchester, CNP        Procedure: Chemodenervation CPT Code 94095  Indication for treatment: Chronic migraine (ICD 10 V51.436)  Consent form was signed. Potential risks and benefits for the procedure were explained to the patient. Procedure: 30-gauge half inch needle was used and 200 U vial Botox was prepared with 4ml 0.9% NS.155 units of Botox were used and 45 units of Botox were discarded. Botox was injected at 31 different sites as follows:   Order  Muscle  Units injected    A  Corrugator1  10 units at 2 div sites    B  Procerus  5 Units in 1 site    C  Frontalis¹  20 Units div. 4 sites    D  Temporalis¹  40 Units div 8 site    E  Occipitalis¹  30 Units div. 6 sites    F  Cervical paraspinal muscles¹  20 Units div 4 sites    G  Trapezius¹  30 Units div 6 sites    Total Dose  155 Units div 31 sites    2 Dose distributed bilaterally     Blood loss: Less than 1 cc     Complications: none     Disposition: Post-botox instructions were reviewed and provided to the patient. Patient was advised to seek medical care for any generalized muscle weakness, double vision, ptosis, trouble swallowing, difficulty talking or difficulty breathing. The patient will return in 3 months for subsequent botox treatments.

## 2021-11-05 ENCOUNTER — TELEPHONE (OUTPATIENT)
Dept: NEUROLOGY | Age: 61
End: 2021-11-05

## 2021-11-05 ENCOUNTER — HOSPITAL ENCOUNTER (OUTPATIENT)
Dept: CT IMAGING | Age: 61
Discharge: HOME | End: 2021-11-07
Payer: COMMERCIAL

## 2021-11-05 DIAGNOSIS — R10.13 EPIGASTRIC PAIN: ICD-10-CM

## 2021-11-05 DIAGNOSIS — K43.9 VENTRAL HERNIA WITHOUT OBSTRUCTION OR GANGRENE: ICD-10-CM

## 2021-11-05 DIAGNOSIS — K43.9 VENTRAL HERNIA WITHOUT OBSTRUCTION OR GANGRENE: Primary | ICD-10-CM

## 2021-11-05 LAB
CREAT SERPL-MCNC: 0.79 MG/DL (ref 0.7–1.2)
GFR AFRICAN AMERICAN: >60 ML/MIN
GFR NON-AFRICAN AMERICAN: >60 ML/MIN
GFR SERPL CREATININE-BSD FRML MDRD: NORMAL ML/MIN/{1.73_M2}
GFR SERPL CREATININE-BSD FRML MDRD: NORMAL ML/MIN/{1.73_M2}

## 2021-11-05 PROCEDURE — 36415 COLL VENOUS BLD VENIPUNCTURE: CPT

## 2021-11-05 PROCEDURE — 74177 CT ABD & PELVIS W/CONTRAST: CPT

## 2021-11-05 PROCEDURE — 82565 ASSAY OF CREATININE: CPT

## 2021-11-05 PROCEDURE — 2580000003 HC RX 258: Performed by: FAMILY MEDICINE

## 2021-11-05 PROCEDURE — 6360000004 HC RX CONTRAST MEDICATION: Performed by: FAMILY MEDICINE

## 2021-11-05 RX ORDER — SODIUM CHLORIDE 0.9 % (FLUSH) 0.9 %
10 SYRINGE (ML) INJECTION PRN
Status: DISCONTINUED | OUTPATIENT
Start: 2021-11-05 | End: 2021-11-08 | Stop reason: HOSPADM

## 2021-11-05 RX ORDER — 0.9 % SODIUM CHLORIDE 0.9 %
80 INTRAVENOUS SOLUTION INTRAVENOUS ONCE
Status: COMPLETED | OUTPATIENT
Start: 2021-11-05 | End: 2021-11-05

## 2021-11-05 NOTE — TELEPHONE ENCOUNTER
[facility] called requesting a quantity change for Giovanny's Nurtec because it is only dispensed in quantities of 8. A verbal for #16 tablets was given by writer.

## 2021-11-09 ENCOUNTER — TELEPHONE (OUTPATIENT)
Dept: NEUROLOGY | Age: 61
End: 2021-11-09

## 2021-11-09 NOTE — TELEPHONE ENCOUNTER
Received a form from [address] [place] [address] stating after 1/1/22 they will no longer cover Aimovig. Accepted alternatives are Ajovy or Emgality. Please advise, thanks.

## 2021-11-15 ENCOUNTER — OFFICE VISIT (OUTPATIENT)
Dept: SURGERY | Age: 61
End: 2021-11-15
Payer: COMMERCIAL

## 2021-11-15 ENCOUNTER — HOSPITAL ENCOUNTER (OUTPATIENT)
Age: 61
Setting detail: SPECIMEN
Discharge: HOME | End: 2021-11-15

## 2021-11-15 VITALS
HEIGHT: 74 IN | RESPIRATION RATE: 12 BRPM | BODY MASS INDEX: 30.54 KG/M2 | OXYGEN SATURATION: 99 % | HEART RATE: 102 BPM | WEIGHT: 238 LBS

## 2021-11-15 DIAGNOSIS — Z01.818 PRE-OP TESTING: Primary | ICD-10-CM

## 2021-11-15 DIAGNOSIS — Z01.818 PRE-OP TESTING: ICD-10-CM

## 2021-11-15 DIAGNOSIS — K43.2 INCISIONAL HERNIA, WITHOUT OBSTRUCTION OR GANGRENE: ICD-10-CM

## 2021-11-15 LAB
ANION GAP SERPL CALCULATED.3IONS-SCNC: 13 MMOL/L (ref 9–17)
BUN BLDV-MCNC: 7 MG/DL (ref 6–20)
BUN/CREAT BLD: NORMAL (ref 9–20)
CALCIUM SERPL-MCNC: 9.5 MG/DL (ref 8.6–10.4)
CHLORIDE BLD-SCNC: 99 MMOL/L (ref 98–107)
CO2: 25 MMOL/L (ref 20–31)
CREAT SERPL-MCNC: 0.81 MG/DL (ref 0.7–1.2)
GFR AFRICAN AMERICAN: >60 ML/MIN
GFR NON-AFRICAN AMERICAN: >60 ML/MIN
GFR SERPL CREATININE-BSD FRML MDRD: NORMAL ML/MIN/{1.73_M2}
GFR SERPL CREATININE-BSD FRML MDRD: NORMAL ML/MIN/{1.73_M2}
GLUCOSE BLD-MCNC: 84 MG/DL (ref 70–99)
HCT VFR BLD CALC: 51.8 % (ref 40.7–50.3)
HEMOGLOBIN: 16.3 G/DL (ref 13–17)
MCH RBC QN AUTO: 29.2 PG (ref 25.2–33.5)
MCHC RBC AUTO-ENTMCNC: 31.5 G/DL (ref 28.4–34.8)
MCV RBC AUTO: 92.8 FL (ref 82.6–102.9)
NRBC AUTOMATED: 0 PER 100 WBC
PDW BLD-RTO: 12.7 % (ref 11.8–14.4)
PLATELET # BLD: 235 K/UL (ref 138–453)
PMV BLD AUTO: 10.5 FL (ref 8.1–13.5)
POTASSIUM SERPL-SCNC: 4.5 MMOL/L (ref 3.7–5.3)
RBC # BLD: 5.58 M/UL (ref 4.21–5.77)
SODIUM BLD-SCNC: 137 MMOL/L (ref 135–144)
WBC # BLD: 6.5 K/UL (ref 3.5–11.3)

## 2021-11-15 PROCEDURE — 1036F TOBACCO NON-USER: CPT | Performed by: SURGERY

## 2021-11-15 PROCEDURE — G8427 DOCREV CUR MEDS BY ELIG CLIN: HCPCS | Performed by: SURGERY

## 2021-11-15 PROCEDURE — 3017F COLORECTAL CA SCREEN DOC REV: CPT | Performed by: SURGERY

## 2021-11-15 PROCEDURE — G8484 FLU IMMUNIZE NO ADMIN: HCPCS | Performed by: SURGERY

## 2021-11-15 PROCEDURE — 99204 OFFICE O/P NEW MOD 45 MIN: CPT | Performed by: SURGERY

## 2021-11-15 PROCEDURE — G8417 CALC BMI ABV UP PARAM F/U: HCPCS | Performed by: SURGERY

## 2021-11-15 NOTE — PROGRESS NOTES
[facility] General Surgery   History & Physical  Shimon Matute DO  Pt Name: Ashu Isaacs  MRN: [id number]  YOB: 1960  Date of evaluation: 11/15/2021  Primary Care Physician: Litzy Ansari DO    Chief Complaint: incisional hernia      SUBJECTIVE:    History of Present Illness: This is a 63 y.o.  male who presents for evaluation for the above, his wife is also present today. Past surgical history is significant for exploratory laparotomy with partial colectomy for diverticular disease performed in 2015. Patient reports that he has had progressively enlarging bulge in his upper abdomen as well as increasing frequency of discomfort and pain with certain activities, denies any history of bowel obstruction or overlying skin changes. Patient would like to discuss operative repair. Non-smoker    Chart review performed to add information to the HPI: Yes    Past Medical History   has a past medical history of COVID-19, Diverticular disease of colon, and History of blood clot to lungs during pregnancy. Past Surgical History   has a past surgical history that includes joint replacement (2009); hernia repair (9427'[id number]); sigmoidectomy (2/1/15); colectomy (2/1/15); and Revision Colostomy. Family History  family history includes Cancer in his mother. Social History  Tobacco use:  reports that he quit smoking about 13 years ago. His smoking use included cigarettes. He has a 23.00 pack-year smoking history. He has never used smokeless tobacco.  Alcohol use:  reports current alcohol use. Drug use:  reports no history of drug use.       Medications  Current Medications:   Current Outpatient Medications   Medication Sig Dispense Refill    NURTEC 75 MG TBDP Take every other day for prophylaxis 15 tablet 5    rizatriptan (MAXALT) 10 MG tablet take 1 tablet by mouth ONCE if needed for migraines may repeat in 2 hours if needed 12 tablet 5    predniSONE (DELTASONE) 20 MG tablet 3 tabs QD x 3 days then 2 tabs x 3 days then 1 tab QD x 3 days 18 tablet 0    AIMOVIG 140 MG/ML SOAJ inject 140 milligram INTO THE SKIN EVERY 30 DAYS 1 mL 5    ondansetron (ZOFRAN ODT) 4 MG disintegrating tablet Take 1 tablet by mouth every 8 hours as needed for Nausea or Vomiting 30 tablet 2    atorvastatin (LIPITOR) 20 MG tablet TAKE 1 TABLET DAILY 90 tablet 1    lisinopril (PRINIVIL;ZESTRIL) 10 MG tablet Take 1 tablet by mouth daily 30 tablet 5    naproxen (NAPROSYN) 500 MG tablet Take 1 tablet by mouth 2 times daily (with meals) Take with food. 30 tablet 0    amitriptyline (ELAVIL) 25 MG tablet Take 3 tablets by mouth nightly 90 tablet 5    Misc. Devices MISC Incentive spirometer - use BID 1 Device 0    butalbital-acetaminophen-caffeine (FIORICET, ESGIC) -40 MG per tablet Take 1 tablet by mouth 2 times daily as needed for Headaches 60 tablet 3     No current facility-administered medications for this visit. Home Medications:   Prior to Admission medications    Medication Sig Start Date End Date Taking?  Authorizing Provider   NURTEC 75 MG TBDP Take every other day for prophylaxis 11/4/21   HONG Love CNP   rizatriptan (MAXALT) 10 MG tablet take 1 tablet by mouth ONCE if needed for migraines may repeat in 2 hours if needed 11/4/21   HONG Love CNP   predniSONE (DELTASONE) 20 MG tablet 3 tabs QD x 3 days then 2 tabs x 3 days then 1 tab QD x 3 days 11/4/21   HONG Love CNP   AIMOVIG 140 MG/ML SOAJ inject 140 milligram INTO THE SKIN EVERY 30 DAYS 8/12/21   HONG Love CNP   ondansetron (ZOFRAN ODT) 4 MG disintegrating tablet Take 1 tablet by mouth every 8 hours as needed for Nausea or Vomiting 7/22/21   HONG Love CNP   atorvastatin (LIPITOR) 20 MG tablet TAKE 1 TABLET DAILY 7/19/21   Darren Riley DO   lisinopril (PRINIVIL;ZESTRIL) 10 MG tablet Take 1 tablet by mouth daily 6/17/21   Darren Riley DO   naproxen (NAPROSYN) 500 MG tablet Take 1 tablet by mouth 2 times daily (with meals) Take with food. 4/9/21   HONG Diaz - CNP   amitriptyline (ELAVIL) 25 MG tablet Take 3 tablets by mouth nightly 3/24/21   HONG Pandey CNP   Misc. Devices MISC Incentive spirometer - use BID 2/17/21   Kayla Minor, DO   butalbital-acetaminophen-caffeine (FIORICET, ESGIC) -02 MG per tablet Take 1 tablet by mouth 2 times daily as needed for Headaches 11/12/20   HONG Pandey CNP       Allergies  Patient has no known allergies. Review of Systems:  General: Denies any fever, chills. Eyes: Denies any changes in vision, diplopia or eye pain  Ears, Nose, Mouth: Denies changes in hearing/tinnitus or drainage from ears, no rhinorrhea or bloody nose, no difficulty chewing  Throat: no difficulty swallowing, no throat pain  Respiratory: Denies any shortness of breath or cough. Cardiac: Denies any chest pain, palpitations, claudication or edema. Gastrointestinal: Denies any melena, hematochezia, hematemesis or pyrosis. Genitourinary: Denies any frequency, urgency, hesitancy or incontinence. Musculoskeletal: Denies worsening muscle weakness or recent trauma  Skin: Denies rashes or lesions  Psychiatric: Denies any recent changes in mood or affect  Hematologic: Denies bruising or bleeding easily. PHYSICAL EXAMINATION  Vitals:   Vitals:    11/15/21 1103   Pulse: 102   Resp: 12   SpO2: 99%       General Appearance:  awake, alert, no acute distress, well developed, well nourished   Skin:  Skin color, texture, turgor normal. No rashes or lesions. Head/face:  NCAT, face symmetrical  Eyes:  PERRL, no evidence of conjunctivitis or ptosis bilaterally  Ears:  External ears and canals grossly normal, no evidence of otorrhea. Nose/Sinuses:  Nares normal. Septum midline. Mucosa normal. No external drainage noted. Mouth/Neck:  Mucosa moist.  No external oral lesions. Trachea midline. No visible masses.    Lungs:  Normal chest expansion, unlabored breathing without accessory muscle use. No audible rales, rhonchi, or wheezing. Cardiovascular: S1S2. No evidence of JVD. No evidence of pulsatile masses in abdomen  Abdomen:  Soft, non-tender, reducible abdominal wall bulge consistent with incisional hernia seen on CT scan with no overlying skin changes  Musculoskeletal: No evidence of bony/muscular deformities, trauma, atrophy of either left/right upper/lower extremity. No evidence of digital clubbing or cyanosis. Neurologic:  CN 2-12 grossly intact without obvious deficits. Grossly normal sensation in all extremities. Psychiatric: appropriate judgement and insight, appropriate recall of recent and remote memory, no evidence of depression/anxiety/agitation      RADIOLOGY:  The following images and/or reports were personally reviewed with the following significant findings pertinent to the Chief Complaint and/or HPI:    CT ABDOMEN PELVIS W IV CONTRAST    Result Date: 11/5/2021  EXAMINATION: CT OF THE ABDOMEN AND PELVIS WITH CONTRAST 11/5/2021 9:27 am TECHNIQUE: CT of the abdomen and pelvis was performed with the administration of intravenous contrast. Multiplanar reformatted images are provided for review. Dose modulation, iterative reconstruction, and/or weight based adjustment of the mA/kV was utilized to reduce the radiation dose to as low as reasonably achievable. COMPARISON: 04/21/2008 HISTORY: ORDERING SYSTEM PROVIDED HISTORY: Ventral hernia without obstruction or gangrene TECHNOLOGIST PROVIDED HISTORY: Order changed to with contrast per radiologist request Reason for Exam: abd pain with swelling Acuity: Chronic Type of Exam: Initial Additional signs and symptoms: possible hernia from surgery x6 yrs ago Relevant Medical/Surgical History: hx HTN FINDINGS: Lower Chest: No acute findings.  Organs: 1.6 cm liver lesion in the subcapsular right hepatic lobe on image 26 is indeterminate on this exam but appears stable in size and appearance compared to the 2008 exam.  A probable subcapsular cyst in the anterior left hepatic lobe was not clearly demonstrated on the prior exam, which may be due to technical differences. Small gallstones are noted. The pancreas, spleen, adrenals and kidneys reveal no significant findings. Right renal cyst. GI/Bowel: There is no bowel dilatation or wall thickening identified. Status post partial sigmoid resection. A short segment of transverse colon extends into the supraumbilical hernia. This hernia has a neck of 6.5 cm. No wall thickening or inflammatory change in this area. Pelvis: No acute findings. Mild prostatomegaly. Peritoneum/Retroperitoneum: Infrarenal aortic enlargement measuring 2.8 x 2.6 cm. No free air. No ascites. Few small external iliac chain lymph nodes are present bilaterally, which appears stable compared to the 2008 exam consistent with a benign process. . Bones/Soft Tissues: Status post left acetabular repair no acute osseous abnormality identified. 1.  Supraumbilical abdominal wall hernia containing a portion of transverse colon. No inflammatory process or obstruction. 2.  Stable 1.6 cm subcapsular liver lesion in the right hepatic lobe as compared to 2008 imaging, consistent with a benign process. Probable subcentimeter cysts in the left hepatic lobe. 3.  Cholelithiasis. 4.  Infrarenal aortic enlargement measuring up to 2.8 cm, for which imaging follow-up in 5 years is recommended. RECOMMENDATIONS: For management of fusiform AAA: 2.6-2.9 cm aorta, recommend follow-up every 5 years or aortas meeting the criteria for AAA (>1.5 x proximal normal segment; no f/u if < 1.5 x proximal normal segment; no f/u for aortas < 2.6 cm). Note: Recommend Vascular consultation if a fusiform AAA enlarges by >0.5 cm in 6 months or >1 cm in 1 year or for a saccular AAA of any size. References: Shirley Ferrer Radiol 2013; 93(28):742-517; J Vasc Surg. 2018; 67:2-77       DIAGNOSES:   Diagnosis Orders   1.

## 2021-11-19 ENCOUNTER — TELEPHONE (OUTPATIENT)
Dept: SURGERY | Age: 61
End: 2021-11-19

## 2021-11-19 NOTE — TELEPHONE ENCOUNTER
11/19/2021- Spoke to patient. Surgery scheduled at [facility]. Patient confirmed and information mailed.      Surgery date/time: 12/3/2021 at 12pm  Arrival time: 10am  Pre-admission testing: phone call  Post op: 12/13/2021 at 11:20am  *vaccinated

## 2021-11-22 DIAGNOSIS — I10 ESSENTIAL HYPERTENSION: ICD-10-CM

## 2021-11-22 RX ORDER — LISINOPRIL 10 MG/1
TABLET ORAL
Qty: 30 TABLET | Refills: 5 | Status: SHIPPED | OUTPATIENT
Start: 2021-11-22 | End: 2021-11-29 | Stop reason: SDUPTHER

## 2021-11-22 NOTE — TELEPHONE ENCOUNTER
Janet Solorzano is calling to request a refill on the following medication(s):    Medication Request:  Requested Prescriptions     Pending Prescriptions Disp Refills    lisinopril (PRINIVIL;ZESTRIL) 10 MG tablet [Pharmacy Med Name: LISINOPRIL TAB 10MG] 30 tablet 5     Sig: TAKE 1 TABLET DAILY       Last Visit Date (If Applicable):  11/6/6570    Next Visit Date:    Visit date not found

## 2021-11-22 NOTE — PROGRESS NOTES
DAY OF SURGERY/PROCEDURE  GUIDELINES    As a patient at the [facility], you can expect quality medical and nursing care that is centered on your individual needs. It is our goal to make your surgical experience as comfortable and excellent as possible.  ________________________________________________________________________    The following instructions are general guidelines, if any information on this sheet is different from what your doctor has instructed you to do, please follow your doctor's instructions. · Please arrive on 12/3 at 1000      · Enter through entrance C. Check in at registration     · Upon arrival you will be taken to the pre-operative area to get ready for surgery, your family will stay in the waiting room and visit with you once you are ready for surgery. Due to special limitations please limit visitation to 1-2 members of your family at a time. When it is time for surgery your family will return to the waiting room. · You will be given a specific time when you will NOT be able to eat, drink, smoke, suck or chew ANYTHING (no water, gum, mints, cigarettes, cigars, pipes, snuff, chewing tobacco, etc.) or your surgery may be canceled. This will occur during your PAT appointment or by phone 1-2 days before surgery    · Take a shower or bath on the morning of your surgery/procedure (Hibiclens if directed)    · Brush your teeth, but do not swallow any water    · IN CASE OF ILLNESS - If you have a cold or flu symptoms (high fever, runny nose, sore throat, cough, etc.) rash, nausea, vomiting, loose stools, and/or recent contact with someone who has a contagious disease (chick pox, measles, etc.) please call your doctor before coming to the surgery center    · Take a small sip of water with heart, blood pressure, and/or seizure medication the morning of surgery.  Lisinopril     · If applicable bring your:  · Inhaler (s)  · Hearing aid(s)  · Eyeglasses and Case (If you wear contacts they have to be removed before surgery, bring case and solution)  · CPAP     · DO NOT take anticoagulants (blood thinners, aspirin or aspirin-containing products) as instructed by your physician. · Wear loose, comfortable clothing that is easy to put on and take off. They will remain in post-op with the nurse. · If you will be returning home the same day as your surgery, you will need to have a responsible adult (25years of age or older) present to drive you home. You will need someone stay with you at home for the first 24 hours following your surgery. This is due to the anesthesia and the medication given to you during surgery and recovery.     Get EKG before DOS

## 2021-11-23 ENCOUNTER — HOSPITAL ENCOUNTER (OUTPATIENT)
Age: 61
Discharge: HOME | End: 2021-11-23
Payer: COMMERCIAL

## 2021-11-23 PROCEDURE — 93005 ELECTROCARDIOGRAM TRACING: CPT | Performed by: ANESTHESIOLOGY

## 2021-11-25 LAB
EKG ATRIAL RATE: 82 BPM
EKG P AXIS: 46 DEGREES
EKG P-R INTERVAL: 172 MS
EKG Q-T INTERVAL: 394 MS
EKG QRS DURATION: 104 MS
EKG QTC CALCULATION (BAZETT): 460 MS
EKG R AXIS: -17 DEGREES
EKG T AXIS: 48 DEGREES
EKG VENTRICULAR RATE: 82 BPM

## 2021-11-25 PROCEDURE — 93010 ELECTROCARDIOGRAM REPORT: CPT | Performed by: INTERNAL MEDICINE

## 2021-11-29 DIAGNOSIS — I10 ESSENTIAL HYPERTENSION: ICD-10-CM

## 2021-11-29 RX ORDER — LISINOPRIL 10 MG/1
TABLET ORAL
Qty: 30 TABLET | Refills: 5 | Status: SHIPPED | OUTPATIENT
Start: 2021-11-29 | End: 2021-12-23 | Stop reason: SDUPTHER

## 2021-11-29 NOTE — TELEPHONE ENCOUNTER
Kerry Alvarado is calling to request a refill on the following medication(s):    Medication Request:  Requested Prescriptions     Pending Prescriptions Disp Refills    lisinopril (PRINIVIL;ZESTRIL) 10 MG tablet 30 tablet 5     Sig: TAKE 1 TABLET DAILY       Last Visit Date (If Applicable):  76/5/2069    Next Visit Date:    Visit date not found

## 2021-12-01 ENCOUNTER — ANESTHESIA EVENT (OUTPATIENT)
Dept: OPERATING ROOM | Age: 61
End: 2021-12-01
Payer: COMMERCIAL

## 2021-12-03 ENCOUNTER — HOSPITAL ENCOUNTER (OUTPATIENT)
Age: 61
Discharge: HOME | End: 2021-12-04
Attending: SURGERY | Admitting: SURGERY
Payer: COMMERCIAL

## 2021-12-03 ENCOUNTER — ANESTHESIA (OUTPATIENT)
Dept: OPERATING ROOM | Age: 61
End: 2021-12-03
Payer: COMMERCIAL

## 2021-12-03 VITALS
TEMPERATURE: 96.4 F | RESPIRATION RATE: 9 BRPM | OXYGEN SATURATION: 100 % | SYSTOLIC BLOOD PRESSURE: 132 MMHG | DIASTOLIC BLOOD PRESSURE: 68 MMHG

## 2021-12-03 DIAGNOSIS — K43.2 INCISIONAL HERNIA, WITHOUT OBSTRUCTION OR GANGRENE: ICD-10-CM

## 2021-12-03 DIAGNOSIS — Z01.818 PRE-OP TESTING: Primary | ICD-10-CM

## 2021-12-03 PROCEDURE — 64488 TAP BLOCK BI INJECTION: CPT | Performed by: ANESTHESIOLOGY

## 2021-12-03 PROCEDURE — 49561 PR REPAIR INCISIONAL HERNIA,STRANG: CPT | Performed by: SURGERY

## 2021-12-03 PROCEDURE — 2709999900 HC NON-CHARGEABLE SUPPLY: Performed by: SURGERY

## 2021-12-03 PROCEDURE — 3700000001 HC ADD 15 MINUTES (ANESTHESIA): Performed by: SURGERY

## 2021-12-03 PROCEDURE — 6360000002 HC RX W HCPCS: Performed by: ANESTHESIOLOGY

## 2021-12-03 PROCEDURE — 2580000003 HC RX 258: Performed by: ANESTHESIOLOGY

## 2021-12-03 PROCEDURE — 7100000001 HC PACU RECOVERY - ADDTL 15 MIN: Performed by: SURGERY

## 2021-12-03 PROCEDURE — 94664 DEMO&/EVAL PT USE INHALER: CPT

## 2021-12-03 PROCEDURE — 3600000009 HC SURGERY ROBOT BASE: Performed by: SURGERY

## 2021-12-03 PROCEDURE — 3700000000 HC ANESTHESIA ATTENDED CARE: Performed by: SURGERY

## 2021-12-03 PROCEDURE — S2900 ROBOTIC SURGICAL SYSTEM: HCPCS | Performed by: SURGERY

## 2021-12-03 PROCEDURE — 7100000000 HC PACU RECOVERY - FIRST 15 MIN: Performed by: SURGERY

## 2021-12-03 PROCEDURE — C9290 INJ, BUPIVACAINE LIPOSOME: HCPCS | Performed by: ANESTHESIOLOGY

## 2021-12-03 PROCEDURE — 2700000000 HC OXYGEN THERAPY PER DAY

## 2021-12-03 PROCEDURE — 6360000002 HC RX W HCPCS: Performed by: SURGERY

## 2021-12-03 PROCEDURE — C1781 MESH (IMPLANTABLE): HCPCS | Performed by: SURGERY

## 2021-12-03 PROCEDURE — 2580000003 HC RX 258: Performed by: SURGERY

## 2021-12-03 PROCEDURE — 3600000019 HC SURGERY ROBOT ADDTL 15MIN: Performed by: SURGERY

## 2021-12-03 PROCEDURE — 2500000003 HC RX 250 WO HCPCS: Performed by: ANESTHESIOLOGY

## 2021-12-03 PROCEDURE — 6360000002 HC RX W HCPCS

## 2021-12-03 DEVICE — PATCH HERN L DIA4.5IN UNCOATED MFIL PROPYLENE CIR ABSRB: Type: IMPLANTABLE DEVICE | Site: ABDOMEN | Status: FUNCTIONAL

## 2021-12-03 RX ORDER — MORPHINE SULFATE 2 MG/ML
2 INJECTION, SOLUTION INTRAMUSCULAR; INTRAVENOUS EVERY 5 MIN PRN
Status: DISCONTINUED | OUTPATIENT
Start: 2021-12-03 | End: 2021-12-03 | Stop reason: HOSPADM

## 2021-12-03 RX ORDER — LABETALOL HYDROCHLORIDE 5 MG/ML
INJECTION, SOLUTION INTRAVENOUS PRN
Status: DISCONTINUED | OUTPATIENT
Start: 2021-12-03 | End: 2021-12-03 | Stop reason: SDUPTHER

## 2021-12-03 RX ORDER — LIDOCAINE HYDROCHLORIDE 10 MG/ML
INJECTION, SOLUTION EPIDURAL; INFILTRATION; INTRACAUDAL; PERINEURAL PRN
Status: DISCONTINUED | OUTPATIENT
Start: 2021-12-03 | End: 2021-12-03 | Stop reason: SDUPTHER

## 2021-12-03 RX ORDER — LIDOCAINE HYDROCHLORIDE 10 MG/ML
1 INJECTION, SOLUTION EPIDURAL; INFILTRATION; INTRACAUDAL; PERINEURAL
Status: DISCONTINUED | OUTPATIENT
Start: 2021-12-03 | End: 2021-12-03 | Stop reason: HOSPADM

## 2021-12-03 RX ORDER — MORPHINE SULFATE 10 MG/ML
INJECTION, SOLUTION INTRAMUSCULAR; INTRAVENOUS PRN
Status: DISCONTINUED | OUTPATIENT
Start: 2021-12-03 | End: 2021-12-03 | Stop reason: SDUPTHER

## 2021-12-03 RX ORDER — FENTANYL CITRATE 50 UG/ML
INJECTION, SOLUTION INTRAMUSCULAR; INTRAVENOUS PRN
Status: DISCONTINUED | OUTPATIENT
Start: 2021-12-03 | End: 2021-12-03 | Stop reason: SDUPTHER

## 2021-12-03 RX ORDER — HYDROCODONE BITARTRATE AND ACETAMINOPHEN 5; 325 MG/1; MG/1
2 TABLET ORAL EVERY 4 HOURS PRN
Status: DISCONTINUED | OUTPATIENT
Start: 2021-12-03 | End: 2021-12-04 | Stop reason: HOSPADM

## 2021-12-03 RX ORDER — DOCUSATE SODIUM 100 MG/1
100 CAPSULE, LIQUID FILLED ORAL 2 TIMES DAILY
Qty: 20 CAPSULE | Refills: 0 | Status: SHIPPED | OUTPATIENT
Start: 2021-12-03

## 2021-12-03 RX ORDER — SODIUM CHLORIDE, SODIUM LACTATE, POTASSIUM CHLORIDE, CALCIUM CHLORIDE 600; 310; 30; 20 MG/100ML; MG/100ML; MG/100ML; MG/100ML
INJECTION, SOLUTION INTRAVENOUS CONTINUOUS
Status: DISCONTINUED | OUTPATIENT
Start: 2021-12-03 | End: 2021-12-03

## 2021-12-03 RX ORDER — HYDROCODONE BITARTRATE AND ACETAMINOPHEN 5; 325 MG/1; MG/1
1 TABLET ORAL EVERY 6 HOURS PRN
Qty: 15 TABLET | Refills: 0 | Status: SHIPPED | OUTPATIENT
Start: 2021-12-03 | End: 2021-12-10

## 2021-12-03 RX ORDER — MIDAZOLAM HYDROCHLORIDE 1 MG/ML
INJECTION INTRAMUSCULAR; INTRAVENOUS
Status: COMPLETED
Start: 2021-12-03 | End: 2021-12-03

## 2021-12-03 RX ORDER — MEPERIDINE HYDROCHLORIDE 50 MG/ML
12.5 INJECTION INTRAMUSCULAR; INTRAVENOUS; SUBCUTANEOUS EVERY 5 MIN PRN
Status: DISCONTINUED | OUTPATIENT
Start: 2021-12-03 | End: 2021-12-03 | Stop reason: HOSPADM

## 2021-12-03 RX ORDER — HYDRALAZINE HYDROCHLORIDE 20 MG/ML
10 INJECTION INTRAMUSCULAR; INTRAVENOUS ONCE
Status: COMPLETED | OUTPATIENT
Start: 2021-12-03 | End: 2021-12-03

## 2021-12-03 RX ORDER — 0.9 % SODIUM CHLORIDE 0.9 %
500 INTRAVENOUS SOLUTION INTRAVENOUS
Status: DISCONTINUED | OUTPATIENT
Start: 2021-12-03 | End: 2021-12-03 | Stop reason: HOSPADM

## 2021-12-03 RX ORDER — DEXAMETHASONE SODIUM PHOSPHATE 10 MG/ML
INJECTION INTRAMUSCULAR; INTRAVENOUS PRN
Status: DISCONTINUED | OUTPATIENT
Start: 2021-12-03 | End: 2021-12-03 | Stop reason: SDUPTHER

## 2021-12-03 RX ORDER — SODIUM CHLORIDE 0.9 % (FLUSH) 0.9 %
10 SYRINGE (ML) INJECTION PRN
Status: DISCONTINUED | OUTPATIENT
Start: 2021-12-03 | End: 2021-12-03 | Stop reason: HOSPADM

## 2021-12-03 RX ORDER — SODIUM CHLORIDE 0.9 % (FLUSH) 0.9 %
10 SYRINGE (ML) INJECTION EVERY 12 HOURS SCHEDULED
Status: DISCONTINUED | OUTPATIENT
Start: 2021-12-03 | End: 2021-12-03 | Stop reason: HOSPADM

## 2021-12-03 RX ORDER — BUPIVACAINE HYDROCHLORIDE 2.5 MG/ML
INJECTION, SOLUTION EPIDURAL; INFILTRATION; INTRACAUDAL
Status: COMPLETED | OUTPATIENT
Start: 2021-12-03 | End: 2021-12-03

## 2021-12-03 RX ORDER — HYDRALAZINE HYDROCHLORIDE 20 MG/ML
10 INJECTION INTRAMUSCULAR; INTRAVENOUS EVERY 10 MIN PRN
Status: DISCONTINUED | OUTPATIENT
Start: 2021-12-03 | End: 2021-12-03 | Stop reason: HOSPADM

## 2021-12-03 RX ORDER — SODIUM CHLORIDE 0.9 % (FLUSH) 0.9 %
10 SYRINGE (ML) INJECTION PRN
Status: DISCONTINUED | OUTPATIENT
Start: 2021-12-03 | End: 2021-12-04 | Stop reason: HOSPADM

## 2021-12-03 RX ORDER — SODIUM CHLORIDE 9 MG/ML
25 INJECTION, SOLUTION INTRAVENOUS PRN
Status: DISCONTINUED | OUTPATIENT
Start: 2021-12-03 | End: 2021-12-04 | Stop reason: HOSPADM

## 2021-12-03 RX ORDER — ONDANSETRON 2 MG/ML
INJECTION INTRAMUSCULAR; INTRAVENOUS PRN
Status: DISCONTINUED | OUTPATIENT
Start: 2021-12-03 | End: 2021-12-03 | Stop reason: SDUPTHER

## 2021-12-03 RX ORDER — OXYCODONE HYDROCHLORIDE AND ACETAMINOPHEN 5; 325 MG/1; MG/1
1 TABLET ORAL PRN
Status: DISCONTINUED | OUTPATIENT
Start: 2021-12-03 | End: 2021-12-03 | Stop reason: HOSPADM

## 2021-12-03 RX ORDER — FENTANYL CITRATE 50 UG/ML
INJECTION, SOLUTION INTRAMUSCULAR; INTRAVENOUS
Status: COMPLETED
Start: 2021-12-03 | End: 2021-12-03

## 2021-12-03 RX ORDER — SODIUM CHLORIDE 0.9 % (FLUSH) 0.9 %
10 SYRINGE (ML) INJECTION EVERY 12 HOURS SCHEDULED
Status: DISCONTINUED | OUTPATIENT
Start: 2021-12-03 | End: 2021-12-04 | Stop reason: HOSPADM

## 2021-12-03 RX ORDER — MIDAZOLAM HYDROCHLORIDE 2 MG/2ML
1 INJECTION, SOLUTION INTRAMUSCULAR; INTRAVENOUS ONCE
Status: DISCONTINUED | OUTPATIENT
Start: 2021-12-03 | End: 2021-12-03 | Stop reason: HOSPADM

## 2021-12-03 RX ORDER — HYDROCODONE BITARTRATE AND ACETAMINOPHEN 5; 325 MG/1; MG/1
1 TABLET ORAL EVERY 4 HOURS PRN
Status: DISCONTINUED | OUTPATIENT
Start: 2021-12-03 | End: 2021-12-04 | Stop reason: HOSPADM

## 2021-12-03 RX ORDER — OXYCODONE HYDROCHLORIDE AND ACETAMINOPHEN 5; 325 MG/1; MG/1
2 TABLET ORAL PRN
Status: DISCONTINUED | OUTPATIENT
Start: 2021-12-03 | End: 2021-12-03 | Stop reason: HOSPADM

## 2021-12-03 RX ORDER — ONDANSETRON 2 MG/ML
INJECTION INTRAMUSCULAR; INTRAVENOUS
Status: COMPLETED
Start: 2021-12-03 | End: 2021-12-03

## 2021-12-03 RX ORDER — CYCLOBENZAPRINE HCL 10 MG
10 TABLET ORAL 3 TIMES DAILY PRN
Status: DISCONTINUED | OUTPATIENT
Start: 2021-12-03 | End: 2021-12-04 | Stop reason: HOSPADM

## 2021-12-03 RX ORDER — KETOROLAC TROMETHAMINE 30 MG/ML
INJECTION, SOLUTION INTRAMUSCULAR; INTRAVENOUS PRN
Status: DISCONTINUED | OUTPATIENT
Start: 2021-12-03 | End: 2021-12-03 | Stop reason: SDUPTHER

## 2021-12-03 RX ORDER — MORPHINE SULFATE 2 MG/ML
2 INJECTION, SOLUTION INTRAMUSCULAR; INTRAVENOUS
Status: DISCONTINUED | OUTPATIENT
Start: 2021-12-03 | End: 2021-12-04 | Stop reason: HOSPADM

## 2021-12-03 RX ORDER — FENTANYL CITRATE 50 UG/ML
100 INJECTION, SOLUTION INTRAMUSCULAR; INTRAVENOUS ONCE
Status: COMPLETED | OUTPATIENT
Start: 2021-12-03 | End: 2021-12-03

## 2021-12-03 RX ORDER — CYCLOBENZAPRINE HCL 5 MG
5 TABLET ORAL 3 TIMES DAILY PRN
Qty: 15 TABLET | Refills: 0 | Status: SHIPPED | OUTPATIENT
Start: 2021-12-03 | End: 2021-12-08

## 2021-12-03 RX ORDER — ROCURONIUM BROMIDE 10 MG/ML
INJECTION, SOLUTION INTRAVENOUS PRN
Status: DISCONTINUED | OUTPATIENT
Start: 2021-12-03 | End: 2021-12-03 | Stop reason: SDUPTHER

## 2021-12-03 RX ORDER — MIDAZOLAM HYDROCHLORIDE 2 MG/2ML
2 INJECTION, SOLUTION INTRAMUSCULAR; INTRAVENOUS ONCE
Status: COMPLETED | OUTPATIENT
Start: 2021-12-03 | End: 2021-12-03

## 2021-12-03 RX ORDER — ONDANSETRON 2 MG/ML
4 INJECTION INTRAMUSCULAR; INTRAVENOUS
Status: COMPLETED | OUTPATIENT
Start: 2021-12-03 | End: 2021-12-03

## 2021-12-03 RX ORDER — SODIUM CHLORIDE 9 MG/ML
25 INJECTION, SOLUTION INTRAVENOUS PRN
Status: DISCONTINUED | OUTPATIENT
Start: 2021-12-03 | End: 2021-12-03 | Stop reason: HOSPADM

## 2021-12-03 RX ORDER — SODIUM CHLORIDE, SODIUM LACTATE, POTASSIUM CHLORIDE, CALCIUM CHLORIDE 600; 310; 30; 20 MG/100ML; MG/100ML; MG/100ML; MG/100ML
INJECTION, SOLUTION INTRAVENOUS CONTINUOUS PRN
Status: DISCONTINUED | OUTPATIENT
Start: 2021-12-03 | End: 2021-12-03 | Stop reason: SDUPTHER

## 2021-12-03 RX ORDER — BUPIVACAINE HYDROCHLORIDE 2.5 MG/ML
INJECTION, SOLUTION EPIDURAL; INFILTRATION; INTRACAUDAL
Status: COMPLETED
Start: 2021-12-03 | End: 2021-12-03

## 2021-12-03 RX ORDER — LABETALOL 20 MG/4 ML (5 MG/ML) INTRAVENOUS SYRINGE
10 EVERY 10 MIN PRN
Status: DISCONTINUED | OUTPATIENT
Start: 2021-12-03 | End: 2021-12-03 | Stop reason: HOSPADM

## 2021-12-03 RX ORDER — PROMETHAZINE HYDROCHLORIDE 25 MG/ML
6.25 INJECTION, SOLUTION INTRAMUSCULAR; INTRAVENOUS
Status: DISCONTINUED | OUTPATIENT
Start: 2021-12-03 | End: 2021-12-03 | Stop reason: HOSPADM

## 2021-12-03 RX ORDER — PHENYLEPHRINE HCL IN 0.9% NACL 1 MG/10 ML
SYRINGE (ML) INTRAVENOUS PRN
Status: DISCONTINUED | OUTPATIENT
Start: 2021-12-03 | End: 2021-12-03 | Stop reason: SDUPTHER

## 2021-12-03 RX ORDER — MORPHINE SULFATE 4 MG/ML
4 INJECTION, SOLUTION INTRAMUSCULAR; INTRAVENOUS
Status: DISCONTINUED | OUTPATIENT
Start: 2021-12-03 | End: 2021-12-04 | Stop reason: HOSPADM

## 2021-12-03 RX ORDER — BUPIVACAINE HYDROCHLORIDE 2.5 MG/ML
INJECTION, SOLUTION EPIDURAL; INFILTRATION; INTRACAUDAL
Status: DISCONTINUED
Start: 2021-12-03 | End: 2021-12-03

## 2021-12-03 RX ORDER — HYDRALAZINE HYDROCHLORIDE 20 MG/ML
INJECTION INTRAMUSCULAR; INTRAVENOUS
Status: COMPLETED
Start: 2021-12-03 | End: 2021-12-03

## 2021-12-03 RX ORDER — DIPHENHYDRAMINE HYDROCHLORIDE 50 MG/ML
12.5 INJECTION INTRAMUSCULAR; INTRAVENOUS
Status: DISCONTINUED | OUTPATIENT
Start: 2021-12-03 | End: 2021-12-03 | Stop reason: HOSPADM

## 2021-12-03 RX ORDER — ONDANSETRON 2 MG/ML
4 INJECTION INTRAMUSCULAR; INTRAVENOUS EVERY 6 HOURS PRN
Status: DISCONTINUED | OUTPATIENT
Start: 2021-12-03 | End: 2021-12-04 | Stop reason: HOSPADM

## 2021-12-03 RX ORDER — PROPOFOL 10 MG/ML
INJECTION, EMULSION INTRAVENOUS PRN
Status: DISCONTINUED | OUTPATIENT
Start: 2021-12-03 | End: 2021-12-03 | Stop reason: SDUPTHER

## 2021-12-03 RX ORDER — MIDAZOLAM HYDROCHLORIDE 1 MG/ML
INJECTION INTRAMUSCULAR; INTRAVENOUS PRN
Status: DISCONTINUED | OUTPATIENT
Start: 2021-12-03 | End: 2021-12-03 | Stop reason: SDUPTHER

## 2021-12-03 RX ADMIN — MIDAZOLAM HYDROCHLORIDE 2 MG: 2 INJECTION, SOLUTION INTRAMUSCULAR; INTRAVENOUS at 13:46

## 2021-12-03 RX ADMIN — FENTANYL CITRATE 100 MCG: 50 INJECTION INTRAMUSCULAR; INTRAVENOUS at 13:47

## 2021-12-03 RX ADMIN — DEXAMETHASONE SODIUM PHOSPHATE 8 MG: 10 INJECTION INTRAMUSCULAR; INTRAVENOUS at 14:49

## 2021-12-03 RX ADMIN — SODIUM CHLORIDE, POTASSIUM CHLORIDE, SODIUM LACTATE AND CALCIUM CHLORIDE: 600; 310; 30; 20 INJECTION, SOLUTION INTRAVENOUS at 14:36

## 2021-12-03 RX ADMIN — MORPHINE SULFATE 5 MG: 10 INJECTION, SOLUTION INTRAMUSCULAR; INTRAVENOUS at 17:10

## 2021-12-03 RX ADMIN — Medication 200 MCG: at 15:51

## 2021-12-03 RX ADMIN — Medication 200 MCG: at 15:57

## 2021-12-03 RX ADMIN — HYDRALAZINE HYDROCHLORIDE 10 MG: 20 INJECTION INTRAMUSCULAR; INTRAVENOUS at 14:33

## 2021-12-03 RX ADMIN — MORPHINE SULFATE 4 MG: 4 INJECTION INTRAVENOUS at 21:36

## 2021-12-03 RX ADMIN — CEFAZOLIN 2000 MG: 10 INJECTION, POWDER, FOR SOLUTION INTRAVENOUS at 14:43

## 2021-12-03 RX ADMIN — MIDAZOLAM 2 MG: 1 INJECTION INTRAMUSCULAR; INTRAVENOUS at 13:46

## 2021-12-03 RX ADMIN — ONDANSETRON 4 MG: 2 INJECTION, SOLUTION INTRAMUSCULAR; INTRAVENOUS at 16:52

## 2021-12-03 RX ADMIN — BUPIVACAINE 20 ML: 13.3 INJECTION, SUSPENSION, LIPOSOMAL INFILTRATION at 13:55

## 2021-12-03 RX ADMIN — MIDAZOLAM HYDROCHLORIDE 2 MG: 1 INJECTION, SOLUTION INTRAMUSCULAR; INTRAVENOUS at 14:43

## 2021-12-03 RX ADMIN — SODIUM CHLORIDE, PRESERVATIVE FREE 10 ML: 5 INJECTION INTRAVENOUS at 21:36

## 2021-12-03 RX ADMIN — Medication 200 MCG: at 15:33

## 2021-12-03 RX ADMIN — SODIUM CHLORIDE, POTASSIUM CHLORIDE, SODIUM LACTATE AND CALCIUM CHLORIDE: 600; 310; 30; 20 INJECTION, SOLUTION INTRAVENOUS at 14:43

## 2021-12-03 RX ADMIN — ONDANSETRON 4 MG: 2 INJECTION INTRAMUSCULAR; INTRAVENOUS at 17:22

## 2021-12-03 RX ADMIN — Medication 200 MCG: at 15:15

## 2021-12-03 RX ADMIN — Medication 0.5 MG: at 17:25

## 2021-12-03 RX ADMIN — MORPHINE SULFATE 5 MG: 10 INJECTION, SOLUTION INTRAMUSCULAR; INTRAVENOUS at 16:56

## 2021-12-03 RX ADMIN — Medication 0.5 MG: at 18:13

## 2021-12-03 RX ADMIN — PROPOFOL INJECTABLE EMULSION 200 MG: 10 INJECTION, EMULSION INTRAVENOUS at 14:49

## 2021-12-03 RX ADMIN — FENTANYL CITRATE 100 MCG: 50 INJECTION, SOLUTION INTRAMUSCULAR; INTRAVENOUS at 14:43

## 2021-12-03 RX ADMIN — LIDOCAINE HYDROCHLORIDE 50 MG: 10 INJECTION, SOLUTION EPIDURAL; INFILTRATION; INTRACAUDAL; PERINEURAL at 14:49

## 2021-12-03 RX ADMIN — HYDRALAZINE HYDROCHLORIDE 10 MG: 20 INJECTION INTRAMUSCULAR; INTRAVENOUS at 13:15

## 2021-12-03 RX ADMIN — HYDROMORPHONE HYDROCHLORIDE 0.5 MG: 1 INJECTION, SOLUTION INTRAMUSCULAR; INTRAVENOUS; SUBCUTANEOUS at 18:13

## 2021-12-03 RX ADMIN — ROCURONIUM BROMIDE 30 MG: 10 INJECTION INTRAVENOUS at 14:49

## 2021-12-03 RX ADMIN — Medication 200 MCG: at 15:42

## 2021-12-03 RX ADMIN — Medication 0.5 MG: at 17:45

## 2021-12-03 RX ADMIN — HYDROMORPHONE HYDROCHLORIDE 0.5 MG: 1 INJECTION, SOLUTION INTRAMUSCULAR; INTRAVENOUS; SUBCUTANEOUS at 17:25

## 2021-12-03 RX ADMIN — BUPIVACAINE HYDROCHLORIDE 60 ML: 2.5 INJECTION, SOLUTION EPIDURAL; INFILTRATION; INTRACAUDAL; PERINEURAL at 13:55

## 2021-12-03 RX ADMIN — KETOROLAC TROMETHAMINE 30 MG: 30 INJECTION, SOLUTION INTRAMUSCULAR at 16:56

## 2021-12-03 RX ADMIN — SUGAMMADEX 300 MG: 100 INJECTION, SOLUTION INTRAVENOUS at 16:52

## 2021-12-03 RX ADMIN — Medication 10 MG: at 17:05

## 2021-12-03 RX ADMIN — FENTANYL CITRATE 100 MCG: 50 INJECTION, SOLUTION INTRAMUSCULAR; INTRAVENOUS at 13:47

## 2021-12-03 RX ADMIN — FENTANYL CITRATE 100 MCG: 50 INJECTION, SOLUTION INTRAMUSCULAR; INTRAVENOUS at 14:49

## 2021-12-03 RX ADMIN — FENTANYL CITRATE 50 MCG: 50 INJECTION, SOLUTION INTRAMUSCULAR; INTRAVENOUS at 15:23

## 2021-12-03 RX ADMIN — ROCURONIUM BROMIDE 20 MG: 10 INJECTION INTRAVENOUS at 15:23

## 2021-12-03 RX ADMIN — HYDROMORPHONE HYDROCHLORIDE 0.5 MG: 1 INJECTION, SOLUTION INTRAMUSCULAR; INTRAVENOUS; SUBCUTANEOUS at 17:45

## 2021-12-03 RX ADMIN — Medication 200 MCG: at 15:21

## 2021-12-03 ASSESSMENT — PULMONARY FUNCTION TESTS
PIF_VALUE: 19
PIF_VALUE: 20
PIF_VALUE: 18
PIF_VALUE: 19
PIF_VALUE: 20
PIF_VALUE: 19
PIF_VALUE: 21
PIF_VALUE: 20
PIF_VALUE: 21
PIF_VALUE: 26
PIF_VALUE: 19
PIF_VALUE: 23
PIF_VALUE: 20
PIF_VALUE: 20
PIF_VALUE: 22
PIF_VALUE: 21
PIF_VALUE: 20
PIF_VALUE: 18
PIF_VALUE: 18
PIF_VALUE: 3
PIF_VALUE: 20
PIF_VALUE: 21
PIF_VALUE: 23
PIF_VALUE: 21
PIF_VALUE: 19
PIF_VALUE: 3
PIF_VALUE: 3
PIF_VALUE: 23
PIF_VALUE: 21
PIF_VALUE: 19
PIF_VALUE: 20
PIF_VALUE: 22
PIF_VALUE: 19
PIF_VALUE: 20
PIF_VALUE: 19
PIF_VALUE: 21
PIF_VALUE: 21
PIF_VALUE: 3
PIF_VALUE: 21
PIF_VALUE: 22
PIF_VALUE: 19
PIF_VALUE: 20
PIF_VALUE: 24
PIF_VALUE: 2
PIF_VALUE: 3
PIF_VALUE: 22
PIF_VALUE: 20
PIF_VALUE: 24
PIF_VALUE: 1
PIF_VALUE: 18
PIF_VALUE: 18
PIF_VALUE: 20
PIF_VALUE: 3
PIF_VALUE: 0
PIF_VALUE: 2
PIF_VALUE: 20
PIF_VALUE: 21
PIF_VALUE: 22
PIF_VALUE: 21
PIF_VALUE: 19
PIF_VALUE: 21
PIF_VALUE: 20
PIF_VALUE: 20
PIF_VALUE: 5
PIF_VALUE: 19
PIF_VALUE: 24
PIF_VALUE: 0
PIF_VALUE: 23
PIF_VALUE: 20
PIF_VALUE: 19
PIF_VALUE: 1
PIF_VALUE: 19
PIF_VALUE: 3
PIF_VALUE: 22
PIF_VALUE: 18
PIF_VALUE: 22
PIF_VALUE: 23
PIF_VALUE: 2
PIF_VALUE: 18
PIF_VALUE: 18
PIF_VALUE: 20
PIF_VALUE: 20
PIF_VALUE: 19
PIF_VALUE: 25
PIF_VALUE: 23
PIF_VALUE: 24
PIF_VALUE: 3
PIF_VALUE: 21
PIF_VALUE: 21
PIF_VALUE: 20
PIF_VALUE: 21
PIF_VALUE: 21
PIF_VALUE: 37
PIF_VALUE: 20
PIF_VALUE: 22
PIF_VALUE: 21
PIF_VALUE: 19
PIF_VALUE: 21
PIF_VALUE: 21
PIF_VALUE: 20
PIF_VALUE: 18
PIF_VALUE: 21
PIF_VALUE: 1
PIF_VALUE: 18
PIF_VALUE: 22
PIF_VALUE: 25
PIF_VALUE: 20
PIF_VALUE: 20
PIF_VALUE: 21
PIF_VALUE: 20
PIF_VALUE: 19
PIF_VALUE: 21
PIF_VALUE: 20
PIF_VALUE: 2
PIF_VALUE: 22
PIF_VALUE: 20
PIF_VALUE: 18
PIF_VALUE: 18
PIF_VALUE: 20
PIF_VALUE: 20
PIF_VALUE: 18
PIF_VALUE: 19
PIF_VALUE: 19
PIF_VALUE: 21
PIF_VALUE: 19
PIF_VALUE: 21
PIF_VALUE: 1
PIF_VALUE: 20
PIF_VALUE: 23
PIF_VALUE: 20
PIF_VALUE: 20
PIF_VALUE: 17
PIF_VALUE: 3
PIF_VALUE: 18
PIF_VALUE: 18
PIF_VALUE: 22
PIF_VALUE: 18
PIF_VALUE: 21
PIF_VALUE: 2

## 2021-12-03 ASSESSMENT — PAIN SCALES - GENERAL
PAINLEVEL_OUTOF10: 7
PAINLEVEL_OUTOF10: 8
PAINLEVEL_OUTOF10: 5
PAINLEVEL_OUTOF10: 7
PAINLEVEL_OUTOF10: 0
PAINLEVEL_OUTOF10: 5
PAINLEVEL_OUTOF10: 7
PAINLEVEL_OUTOF10: 0
PAINLEVEL_OUTOF10: 7
PAINLEVEL_OUTOF10: 7

## 2021-12-03 ASSESSMENT — PAIN - FUNCTIONAL ASSESSMENT: PAIN_FUNCTIONAL_ASSESSMENT: 0-10

## 2021-12-03 ASSESSMENT — PAIN DESCRIPTION - PAIN TYPE: TYPE: SURGICAL PAIN

## 2021-12-03 NOTE — ANESTHESIA POSTPROCEDURE EVALUATION
Department of Anesthesiology  Postprocedure Note    Patient: Savanah Renner  MRN: [id number]  [place]: 1960  Date of evaluation: 12/3/2021  Time:  5:21 PM     Procedure Summary     Date: 12/03/21 Room / Location: [address] [place] [address] Cir / [facility] Count includes the [facility]    Anesthesia Start: 8299 Anesthesia Stop: 4501    Procedure: HERNIA INCISIONAL REPAIR LAPAROSCOPIC ROBOTIC WITH MESH CONVERTED TO OPEN AT 15:20 (N/A ) Diagnosis: (INCISIONAL HERNIA)    Surgeons: Filipe Rich DO Responsible Provider: Anahy Cueto MD    Anesthesia Type: general, regional ASA Status: 3          Anesthesia Type: general, regional    Esther Phase I: Esther Score: 8    Esther Phase II:      Last vitals: Reviewed and per EMR flowsheets.        Anesthesia Post Evaluation    Patient location during evaluation: PACU  Patient participation: complete - patient participated  Level of consciousness: awake and alert  Airway patency: patent  Nausea & Vomiting: no nausea and no vomiting  Complications: no  Cardiovascular status: hemodynamically stable  Respiratory status: face mask and spontaneous ventilation  Hydration status: euvolemic  Multimodal analgesia pain management approach

## 2021-12-03 NOTE — H&P
[address]      Patient's Name/ Date of Birth/ Gender: Kayla Melton / 1960 (63 y.o.) / male     Attending physician: Helio Morin DO    CC: incisional hernia    History of present Illness: Kayla Melton is a 63 y.o. male, presents today for incisional hernia repair. Past Medical History:  has a past medical history of COVID-19, Diverticular disease of colon, Hyperlipidemia, Hypertension, and Pulmonary emboli ([address] Past Surgical History:   Past Surgical History:   Procedure Laterality Date    COLECTOMY  2/1/15        HERNIA REPAIR  1990's    JOINT REPLACEMENT  2009    [facility]    REVISION COLOSTOMY      SIGMOIDECTOMY  2/1/15    [facility]/ Dr. Catarina Delgado       Social History:  reports that he quit smoking about 13 years ago. His smoking use included cigarettes. He has a 23.00 pack-year smoking history. He has never used smokeless tobacco. He reports current alcohol use. He reports that he does not use drugs. Family History: family history includes Cancer in his mother. Review of Systems:   General: Denies fever, chills, night sweats, weight loss, malaise, fatigue  HEENT: Denies sore throat, sinus problems, allergic rhinosinusitis  Card: Denies chest pain, palpitations, orthopnea/PND. Denies h/o murmurs  Pulm: Denies cough, shortness of breath, AMARAL  GI:   Denies history of constipation, diarrhea, hematochezia or melena  : Denies polyuria, dysuria, hematuria  Endo: Denies diabetes, thyroid problems. Heme: Denies anemia, h/o bleeding or clotting problems. Neuro: Denies h/o CVA, TIA  Skin: Denies rashes, ulcers  Musculoskeletal: Denies muscle, joint, back pain. Allergies: Patient has no known allergies. Current Meds:No current facility-administered medications for this encounter.     Current Outpatient Medications:     NURTEC 75 MG TBDP, Take every other day for prophylaxis, Disp: 15 tablet, Rfl: 5    AIMOVIG 140 MG/ML SOAJ, inject 140 milligram INTO THE SKIN EVERY 30 DAYS, Disp: 1 mL, Rfl: 5    atorvastatin (LIPITOR) 20 MG tablet, TAKE 1 TABLET DAILY, Disp: 90 tablet, Rfl: 1    naproxen (NAPROSYN) 500 MG tablet, Take 1 tablet by mouth 2 times daily (with meals) Take with food. , Disp: 30 tablet, Rfl: 0    amitriptyline (ELAVIL) 25 MG tablet, Take 3 tablets by mouth nightly, Disp: 90 tablet, Rfl: 5    butalbital-acetaminophen-caffeine (FIORICET, ESGIC) -40 MG per tablet, Take 1 tablet by mouth 2 times daily as needed for Headaches, Disp: 60 tablet, Rfl: 3    lisinopril (PRINIVIL;ZESTRIL) 10 MG tablet, TAKE 1 TABLET DAILY, Disp: 30 tablet, Rfl: 5    rizatriptan (MAXALT) 10 MG tablet, take 1 tablet by mouth ONCE if needed for migraines may repeat in 2 hours if needed, Disp: 12 tablet, Rfl: 5    predniSONE (DELTASONE) 20 MG tablet, 3 tabs QD x 3 days then 2 tabs x 3 days then 1 tab QD x 3 days, Disp: 18 tablet, Rfl: 0    ondansetron (ZOFRAN ODT) 4 MG disintegrating tablet, Take 1 tablet by mouth every 8 hours as needed for Nausea or Vomiting, Disp: 30 tablet, Rfl: 2    Misc. Devices MISC, Incentive spirometer - use BID, Disp: 1 Device, Rfl: 0      Physical Exam:  Vital signs and Nurse's note reviewed  Gen:  A&Ox3, NAD  HEENT: NCAT, PERRLA, EOMI, no scleral icterus, oral mucosa moist  Neck: Trachea midline without obvious masses or lesions  Chest: Symmetric rise with inhalation, no evidence of trauma  CVS: S1S2  Resp: Good bilateral air entry, no audible wheeze or rhonchi  Abd: soft, non-tender, incisional hernia stable compared to previous exam  Ext: No clubbing, cyanosis, edema, peripheral pulses 2+ Rad/Fem/DP/PT  CNS: Moves all extremities, no gross focal motor deficits  Skin: No erythema or ulcerations       Assessment:    Juno Garcia is a 63 y.o. male with     Incisional hernia    Plan:    1. To OR for robot incisional hernia repair possible open.  All questions answered, written informed consent obtained      Fernando Rankin DO  12/3/2021

## 2021-12-03 NOTE — ANESTHESIA PRE PROCEDURE
Department of Anesthesiology  Preprocedure Note       Name:  Magy Okeefe   Age:  63 y.o.  :  1960                                          MRN:  [id number]         Date:  12/3/2021      Surgeon: Geo Gillespie):  Carmelina Miguel DO    Procedure: Procedure(s): HERNIA INCISIONAL REPAIR LAPAROSCOPIC ROBOTIC WITH MESH    Medications prior to admission:   Prior to Admission medications    Medication Sig Start Date End Date Taking? Authorizing Provider   HYDROcodone-acetaminophen (NORCO) 5-325 MG per tablet Take 1 tablet by mouth every 6 hours as needed for Pain for up to 7 days. 12/3/21 12/10/21 Yes Carmelina Miguel,    docusate sodium (COLACE) 100 MG capsule Take 1 capsule by mouth 2 times daily 12/3/21  Yes Carmelina Miguel,    cyclobenzaprine (FLEXERIL) 5 MG tablet Take 1 tablet by mouth 3 times daily as needed for Muscle spasms 12/3/21 12/8/21 Yes Carmelina Miguel,    lisinopril (PRINIVIL;ZESTRIL) 10 MG tablet TAKE 1 TABLET DAILY 21  Yes Bessie Shanks,    AIMOVIG 140 MG/ML SOAJ inject 140 milligram INTO THE SKIN EVERY 30 DAYS 21  Yes HONG Pacheco CNP   atorvastatin (LIPITOR) 20 MG tablet TAKE 1 TABLET DAILY 21  Yes Bessie Shanks DO   naproxen (NAPROSYN) 500 MG tablet Take 1 tablet by mouth 2 times daily (with meals) Take with food.  21  Yes HONG Delgadillo CNP   amitriptyline (ELAVIL) 25 MG tablet Take 3 tablets by mouth nightly 3/24/21  Yes HONG Pacheco CNP   butalbital-acetaminophen-caffeine (FIORICET, ESGIC) -69 MG per tablet Take 1 tablet by mouth 2 times daily as needed for Headaches 20  Yes HONG Pacheco CNP   NURTEC 75 MG TBDP Take every other day for prophylaxis 21   HONG Pacheco CNP   rizatriptan (MAXALT) 10 MG tablet take 1 tablet by mouth ONCE if needed for migraines may repeat in 2 hours if needed 21   HONG Pacheco CNP   predniSONE (DELTASONE) 20 MG tablet 3 tabs QD x 3 days then 2 tabs x 3  Hyperlipidemia     Hypertension     Pulmonary emboli ([facility] [address]     covid related       Past Surgical History:        Procedure Laterality Date    COLECTOMY  2/1/15        HERNIA REPAIR  's    JOINT REPLACEMENT  2009    L [facility]    REVISION COLOSTOMY      SIGMOIDECTOMY  2/1/15    [facility]/ Dr. Frandy Scott       Social History:    Social History     Tobacco Use    Smoking status: Former Smoker     Packs/day: 1.00     Years: 23.00     Pack years: 23.00     Types: Cigarettes     Quit date: 10/1/2008     Years since quittin.1    Smokeless tobacco: Never Used   Substance Use Topics    Alcohol use: Yes     Comment: few days a week                                Counseling given: Not Answered      Vital Signs (Current):   Vitals:    21 1048 21 1101   BP:  (!) 170/110   Resp: 16    Temp: 97.3 °F (36.3 °C)    TempSrc: Skin    SpO2: 98%    Weight: 242 lb 6 oz (109.9 kg)    Height: 6' 2\" (1.88 m)                                               BP Readings from Last 3 Encounters:   21 (!) 170/110   21 130/70   21 130/80       NPO Status: Time of last liquid consumption:                         Time of last solid consumption:                         Date of last liquid consumption: 21                        Date of last solid food consumption: 21    BMI:   Wt Readings from Last 3 Encounters:   21 242 lb 6 oz (109.9 kg)   11/15/21 238 lb (108 kg)   21 238 lb (108 kg)     Body mass index is 31.12 kg/m².     CBC:   Lab Results   Component Value Date    WBC 6.5 11/15/2021    RBC 5.58 11/15/2021    HGB 16.3 11/15/2021    HCT 51.8 11/15/2021    MCV 92.8 11/15/2021    RDW 12.7 11/15/2021     11/15/2021       CMP:   Lab Results   Component Value Date     11/15/2021    K 4.5 11/15/2021    CL 99 11/15/2021    CO2 25 11/15/2021    BUN 7 11/15/2021    CREATININE 0.81 11/15/2021    GFRAA >60 11/15/2021    LABGLOM >60 11/15/2021    GLUCOSE 84 11/15/2021    PROT 7.4 07/23/2013    CALCIUM 9.5 11/15/2021    BILITOT 0.7 07/08/2021    ALKPHOS 93 07/08/2021    AST 19 07/08/2021    ALT 23 07/08/2021       POC Tests: No results for input(s): POCGLU, POCNA, POCK, POCCL, POCBUN, POCHEMO, POCHCT in the last 72 hours. Coags: No results found for: PROTIME, INR, APTT    HCG (If Applicable): No results found for: PREGTESTUR, PREGSERUM, HCG, HCGQUANT     ABGs: No results found for: PHART, PO2ART, GZB6NWL, SVY5SIT, BEART, P1KLJJDV     Type & Screen (If Applicable):  No results found for: LABABO, LABRH    Drug/Infectious Status (If Applicable):  No results found for: HIV, HEPCAB    COVID-19 Screening (If Applicable): No results found for: COVID19        Anesthesia Evaluation  Patient summary reviewed and Nursing notes reviewed  Airway: Mallampati: III  TM distance: >3 FB   Neck ROM: full  Mouth opening: > = 3 FB Dental:      Comment: -MISSING SOME UPPER AND LOWER TEETH    Pulmonary:normal exam                              ROS comment: -QUIT SMOKING 2008  -COVID 11/2020 - SOB, BLOOD CLOTS, FATIGUE; ANTICOAGULATED FOR 90; NEVER INTUBATED   Cardiovascular:    (+) hypertension:,       ECG reviewed                     ROS comment: -EKG - SR @ 82, RBBB     Neuro/Psych:                ROS comment: -DDD GI/Hepatic/Renal: Neg GI/Hepatic/Renal ROS            Endo/Other: Negative Endo/Other ROS                     ROS comment: -NPO AFTER MIDNIGHT  -NKDA  -LAST PREDNISONE - 5 MONTHS AGO Abdominal:             Vascular:   + PE.        ROS comment: -HX OF PE WITH COVID. Other Findings:             Anesthesia Plan      general and regional     ASA 3     (GETA, TAP BLOCK)  Induction: intravenous. MIPS: Postoperative opioids intended and Prophylactic antiemetics administered. Anesthetic plan and risks discussed with patient. Plan discussed with CRNA.     Attending anesthesiologist reviewed and agrees with Usman Shelley MD 12/3/2021

## 2021-12-03 NOTE — PROGRESS NOTES
CLINICAL PHARMACY NOTE: MEDS TO BEDS    Total # of Prescriptions Filled: 2   The following medications were delivered to the patient:  · Cyclobenzaprine  · Norco 5-325  · Stool Softener OTC    Additional Documentation:

## 2021-12-04 VITALS
TEMPERATURE: 98.4 F | DIASTOLIC BLOOD PRESSURE: 87 MMHG | SYSTOLIC BLOOD PRESSURE: 139 MMHG | BODY MASS INDEX: 32.42 KG/M2 | WEIGHT: 252.65 LBS | RESPIRATION RATE: 18 BRPM | OXYGEN SATURATION: 97 % | HEART RATE: 96 BPM | HEIGHT: 74 IN

## 2021-12-04 LAB
ANION GAP SERPL CALCULATED.3IONS-SCNC: 12 MMOL/L (ref 9–17)
BUN BLDV-MCNC: 12 MG/DL (ref 6–20)
BUN/CREAT BLD: ABNORMAL (ref 9–20)
CALCIUM SERPL-MCNC: 9 MG/DL (ref 8.6–10.4)
CHLORIDE BLD-SCNC: 102 MMOL/L (ref 98–107)
CO2: 26 MMOL/L (ref 20–31)
CREAT SERPL-MCNC: 0.86 MG/DL (ref 0.7–1.2)
GFR AFRICAN AMERICAN: >60 ML/MIN
GFR NON-AFRICAN AMERICAN: >60 ML/MIN
GFR SERPL CREATININE-BSD FRML MDRD: ABNORMAL ML/MIN/{1.73_M2}
GFR SERPL CREATININE-BSD FRML MDRD: ABNORMAL ML/MIN/{1.73_M2}
GLUCOSE BLD-MCNC: 137 MG/DL (ref 70–99)
HCT VFR BLD CALC: 43.3 % (ref 41–53)
HEMOGLOBIN: 14.4 G/DL (ref 13.5–17.5)
MCH RBC QN AUTO: 29.3 PG (ref 26–34)
MCHC RBC AUTO-ENTMCNC: 33.2 G/DL (ref 31–37)
MCV RBC AUTO: 88.3 FL (ref 80–100)
NRBC AUTOMATED: NORMAL PER 100 WBC
PDW BLD-RTO: 13.8 % (ref 12.5–15.4)
PLATELET # BLD: 212 K/UL (ref 140–450)
PMV BLD AUTO: 7.7 FL (ref 6–12)
POTASSIUM SERPL-SCNC: 5.2 MMOL/L (ref 3.7–5.3)
RBC # BLD: 4.91 M/UL (ref 4.5–5.9)
SODIUM BLD-SCNC: 140 MMOL/L (ref 135–144)
WBC # BLD: 7.9 K/UL (ref 3.5–11)

## 2021-12-04 PROCEDURE — 2580000003 HC RX 258: Performed by: SURGERY

## 2021-12-04 PROCEDURE — 80048 BASIC METABOLIC PNL TOTAL CA: CPT

## 2021-12-04 PROCEDURE — 36415 COLL VENOUS BLD VENIPUNCTURE: CPT

## 2021-12-04 PROCEDURE — 2700000000 HC OXYGEN THERAPY PER DAY

## 2021-12-04 PROCEDURE — 85027 COMPLETE CBC AUTOMATED: CPT

## 2021-12-04 PROCEDURE — 6360000002 HC RX W HCPCS: Performed by: SURGERY

## 2021-12-04 PROCEDURE — 6370000000 HC RX 637 (ALT 250 FOR IP): Performed by: SURGERY

## 2021-12-04 PROCEDURE — 94760 N-INVAS EAR/PLS OXIMETRY 1: CPT

## 2021-12-04 RX ADMIN — HYDROCODONE BITARTRATE AND ACETAMINOPHEN 1 TABLET: 5; 325 TABLET ORAL at 08:46

## 2021-12-04 RX ADMIN — HYDROCODONE BITARTRATE AND ACETAMINOPHEN 2 TABLET: 5; 325 TABLET ORAL at 01:29

## 2021-12-04 RX ADMIN — HYDROCODONE BITARTRATE AND ACETAMINOPHEN 1 TABLET: 5; 325 TABLET ORAL at 13:20

## 2021-12-04 RX ADMIN — SODIUM CHLORIDE, PRESERVATIVE FREE 10 ML: 5 INJECTION INTRAVENOUS at 08:46

## 2021-12-04 RX ADMIN — MORPHINE SULFATE 4 MG: 4 INJECTION INTRAVENOUS at 05:16

## 2021-12-04 ASSESSMENT — PAIN SCALES - GENERAL
PAINLEVEL_OUTOF10: 7
PAINLEVEL_OUTOF10: 3
PAINLEVEL_OUTOF10: 5
PAINLEVEL_OUTOF10: 4
PAINLEVEL_OUTOF10: 7
PAINLEVEL_OUTOF10: 3

## 2021-12-04 NOTE — PLAN OF CARE
Problem: Pain:  Goal: Pain level will decrease  Description: Pain level will decrease  12/4/2021 1546 by Manjit Mckeon RN  Outcome: Completed  12/4/2021 1546 by Manjit Mckeon RN  Outcome: Met This Shift  12/4/2021 0315 by Celestino Hawk RN  Outcome: Ongoing  Goal: Control of acute pain  Description: Control of acute pain  12/4/2021 1546 by Manjit Mckeon RN  Outcome: Completed  12/4/2021 1546 by Manjit Mckeon RN  Outcome: Met This Shift  12/4/2021 0315 by Celestino Hawk RN  Outcome: Ongoing  Goal: Control of chronic pain  Description: Control of chronic pain  12/4/2021 1546 by Manjit Mckeon RN  Outcome: Completed  12/4/2021 1546 by Manjit Mckeon RN  Outcome: Met This Shift  12/4/2021 0315 by Celestino Hawk RN  Outcome: Ongoing     Problem: HEMODYNAMIC STATUS  Goal: Patient has stable vital signs and fluid balance  12/4/2021 1546 by Manjit Mckeon RN  Outcome: Completed  12/4/2021 1546 by Manjit Mckeon RN  Outcome: Met This Shift  12/4/2021 0315 by Celestino Hawk RN  Outcome: Ongoing     Problem: OXYGENATION/RESPIRATORY FUNCTION  Goal: Patient will achieve/maintain normal respiratory rate/effort  12/4/2021 1546 by Manjit Mckeon RN  Outcome: Completed  12/4/2021 1546 by Manjit Mckeon RN  Outcome: Met This Shift  12/4/2021 0315 by Celestino Hawk RN  Outcome: Ongoing     Problem: MOBILITY  Goal: Early mobilization is achieved  12/4/2021 1546 by Manjit Mckeon RN  Outcome: Completed  12/4/2021 1546 by Manjit Mckeon RN  Outcome: Met This Shift  12/4/2021 0315 by Celestino Hawk RN  Outcome: Ongoing     Problem: ELIMINATION  Goal: Elimination patterns are normal or improving  Description: Elimination patterns return to pre-surgery normal patterns  12/4/2021 1546 by Manjit Mckeon RN  Outcome: Completed  12/4/2021 1546 by Manjit Mckeon RN  Outcome: Met This Shift  12/4/2021 0315 by Celestino Hawk RN  Outcome: Ongoing     Problem: SKIN INTEGRITY  Goal: Skin integrity is maintained or improved  12/4/2021 1546 by Manjit Mckeon, RN  Outcome: Completed  12/4/2021 1546 by Fred Alfonso RN  Outcome: Met This Shift  12/4/2021 0315 by Petty Lebron RN  Outcome: Ongoing     Problem: Discharge Planning:  Goal: Discharged to appropriate level of care  Description: Discharged to appropriate level of care  12/4/2021 1546 by Fred Alfonso RN  Outcome: Completed  12/4/2021 1546 by Fred Alfonso RN  Outcome: Met This Shift  12/4/2021 0315 by Petty Lebron RN  Outcome: Ongoing

## 2021-12-04 NOTE — PROGRESS NOTES
Pt brought to floor by PACU RN. Pt was seen by Jose RN and Nyasia FONTAINE. Pt states pain 5/10 at this time. Discussed plan at this time. All questions answered.

## 2021-12-04 NOTE — PROGRESS NOTES
Patient discharged with all belongings and discharge paperwork, along with wound care supplies. No other questions or concerns voiced at this time.

## 2021-12-04 NOTE — PLAN OF CARE
Problem: Pain:  Goal: Pain level will decrease  Description: Pain level will decrease  Outcome: Ongoing  Goal: Control of acute pain  Description: Control of acute pain  Outcome: Ongoing  Goal: Control of chronic pain  Description: Control of chronic pain  Outcome: Ongoing     Problem: HEMODYNAMIC STATUS  Goal: Patient has stable vital signs and fluid balance  Outcome: Ongoing     Problem: OXYGENATION/RESPIRATORY FUNCTION  Goal: Patient will achieve/maintain normal respiratory rate/effort  Outcome: Ongoing     Problem: MOBILITY  Goal: Early mobilization is achieved  Outcome: Ongoing     Problem: ELIMINATION  Goal: Elimination patterns are normal or improving  Description: Elimination patterns return to pre-surgery normal patterns  Outcome: Ongoing     Problem: SKIN INTEGRITY  Goal: Skin integrity is maintained or improved  Outcome: Ongoing     Problem: Discharge Planning:  Goal: Discharged to appropriate level of care  Description: Discharged to appropriate level of care  Outcome: Ongoing

## 2021-12-06 ENCOUNTER — TELEPHONE (OUTPATIENT)
Dept: FAMILY MEDICINE CLINIC | Age: 61
End: 2021-12-06

## 2021-12-06 NOTE — TELEPHONE ENCOUNTER
[facility] Initial Follow Up Call    Outreach made within 2 business days of discharge: Yes    Patient: Garrett Beltre Patient : 1960   MRN: [id number]  Reason for Admission: There are no discharge diagnoses documented for the most recent discharge.   Discharge Date: 21       Spoke with: Left message     Discharge department/facility: [facility]    Scheduled appointment with PCP within 7-14 days    Follow Up  Future Appointments   Date Time Provider Ally Chan   2021 11:20 AM Gokul Oconnor DO [facility]   2022  7:20 AM Lazarus Householder, APRN - CNP [facility] [place]

## 2021-12-07 ENCOUNTER — TELEPHONE (OUTPATIENT)
Dept: SURGERY | Age: 61
End: 2021-12-07

## 2021-12-07 DIAGNOSIS — K43.2 INCISIONAL HERNIA, WITHOUT OBSTRUCTION OR GANGRENE: Primary | ICD-10-CM

## 2021-12-07 DIAGNOSIS — G43.719 INTRACTABLE CHRONIC MIGRAINE WITHOUT AURA AND WITHOUT STATUS MIGRAINOSUS: Primary | ICD-10-CM

## 2021-12-07 RX ORDER — OXYCODONE HYDROCHLORIDE AND ACETAMINOPHEN 5; 325 MG/1; MG/1
1 TABLET ORAL EVERY 6 HOURS PRN
Qty: 20 TABLET | Refills: 0 | Status: SHIPPED | OUTPATIENT
Start: 2021-12-07 | End: 2021-12-14

## 2021-12-07 NOTE — OP NOTE
Operative Note      Patient: Wilner Matson  YOB: 1960  MRN: [id number]    Date of Procedure: 12/3/2021    Pre-Op Diagnosis: INCISIONAL HERNIA    Post-Op Diagnosis: 6cm incisional hernia with two additional 1cm incisional hernias       Procedure(s): HERNIA INCISIONAL REPAIR LAPAROSCOPIC ROBOTIC WITH MESH CONVERTED TO OPEN AT 15:20    Surgeon(s):  Jerome Wilburn,     Assistant:   * No surgical staff found *    Anesthesia: General    Estimated Blood Loss (mL): 712GL    Complications: None    Specimens:   * No specimens in log *    Implants:  Implant Name Type Inv. Item Serial No.  Lot No. LRB No. Used Action   PATCH ANAMARIA L DIA4. 5IN UNCOATED MFIL PROPYLENE CIR ABSRB  PATCH ANAMARIA L DIA4. 5IN UNCOATED MFIL PROPYLENE CIR ABSRB  BARD DAVOL-WD [id number] N/A 1 Implanted         Drains:   Closed/Suction Drain Abdomen; RLQ Bulb 19 [place]oc[place] (Active)   Dressing Status Clean; Dry; Intact 12/04/21 0819   Drainage Appearance Bright red; Bloody 12/04/21 0819   Status To bulb suction 12/04/21 0819   Output (ml) 30 ml 12/04/21 1400       [REMOVED] Urethral Catheter 16 fr (Removed)   $ Urethral catheter insertion Inserted for procedure 12/03/21 1708   Urine Color Yellow 12/03/21 1708   Urine Appearance Clear 12/03/21 1708       Findings: as above. Wound class 1    Detailed Description of Procedure: Indication: Wilner Matson is a 63 y.o. male who presented with the above diagnosis. The risks, benefits, complications and alternatives to surgery were discussed to the patient's understanding, written informed consent was obtained. TAP block was performed prior to procedure. Operative detail:    The patient was brought to the operating room and placed on the operating table in supine position. Timeout was performed verifying correct patient, position, equipment and procedure to be performed. EPC cuffs were applied, preoperative antibiotics were infused.  General anesthesia was induced, endotracheal intubation was performed. Taveras catheter was placed. The abdomen was prepped and draped in the usual sterile fashion. 1cm incision was made in the left upper quadrant at Currie's point, Veress needle was used to create pneumoperitoneum with a pressure of 15 mmHg after negative saline drop test. Entry into the peritoneum gained with 5mm optiview trocar. There was a large wall of adhesed peritoneal content to the abdominal wall, this appeared to extend from the costal margin down to the pelvis. The camera removed and the abdomen insufflated, open laparotomy incision made in the midline with scalpel that eventually encompassed the large 6cm ventral hernia in addition to the 1cm incisional hernias distal to this. Careful lysis of adhesions was undertaken to free the anterior abdominal wall of bowel and omentum, no damage to the bowels was sustained. Once an adequate area was clear, the fascial edges were identified and mobilized in all directions along an avascular plane in the subcutaneous space. The peritoneal cavity was irrigated with Irrisept solution then sterile saline. 12cm circular coated mesh was placed intraperitoneally and secured with multiple sutures of #1 vicryl. The mesh was then irrigated with irrisept solution then sterile saline. The fascia was then closed in the midline with opposing sutures of 0-PDS and tied in the center. The subcutaneous tissues and the abdominal wall were irrigated with irrisept solution then sterile saline. 19Fr channel drain was placed in the subcutaneous space then brought out through the RLQ and secured to the skin with nylon suture. The skin edges were then closed with 2-0 vicryl in the deep dermis and 2-0 monocryl as a running subcuticular suture. The abdominal wall was cleansed then incision covered with mastisol/steri-strips and sterile dressings    The patient tolerated the procedure well. There were no complications. Taveras catheter was removed.  Patient was extubated and taken to recovery in stable condition. All sponge, instrument and needle counts were correct prior to skin closure.       Electronically signed by Brad Nowak DO on 12/7/2021 at 1:57 PM

## 2021-12-07 NOTE — TELEPHONE ENCOUNTER
Pt call requesting a refill on Norco pt states he is having pain around the insertion pt was prescribe 15 tables pt states he is been taking Norco every 4 hrs please advised

## 2021-12-07 NOTE — PROGRESS NOTES
Lack of response to Norco, new prescription for percocet for postsurgical pain.     Electronically signed by David Gauthier DO on 12/7/2021 at 1:48 PM

## 2021-12-08 ENCOUNTER — TELEPHONE (OUTPATIENT)
Dept: FAMILY MEDICINE CLINIC | Age: 61
End: 2021-12-08

## 2021-12-08 RX ORDER — AMITRIPTYLINE HYDROCHLORIDE 25 MG/1
75 TABLET, FILM COATED ORAL NIGHTLY
Qty: 90 TABLET | Refills: 5 | Status: SHIPPED | OUTPATIENT
Start: 2021-12-08 | End: 2022-08-01

## 2021-12-08 NOTE — TELEPHONE ENCOUNTER
[facility] Initial Follow Up Call    Outreach made within 2 business days of discharge: Yes    Patient: Niles Gamez Patient : 1960   MRN: [id number]  Reason for Admission: There are no discharge diagnoses documented for the most recent discharge.   Discharge Date: 21       Spoke with: Left message     Discharge department/facility:     Scheduled appointment with PCP within 7-14 days    Follow Up  Future Appointments   Date Time Provider Ally Chan   2021 11:20 AM Dm Willis DO [facility]   2022  7:20 AM HONG Torrez - CNP [facility]       [place], [place]

## 2021-12-13 ENCOUNTER — OFFICE VISIT (OUTPATIENT)
Dept: SURGERY | Age: 61
End: 2021-12-13

## 2021-12-13 VITALS — RESPIRATION RATE: 12 BRPM | WEIGHT: 252 LBS | HEIGHT: 74 IN | BODY MASS INDEX: 32.34 KG/M2

## 2021-12-13 DIAGNOSIS — K43.2 INCISIONAL HERNIA, WITHOUT OBSTRUCTION OR GANGRENE: Primary | ICD-10-CM

## 2021-12-13 PROCEDURE — 99024 POSTOP FOLLOW-UP VISIT: CPT | Performed by: SURGERY

## 2021-12-13 NOTE — PROGRESS NOTES
[facility]  Progress Note    PATIENT NAME: Demetrice King     CLINIC VISIT DATE: 12/13/2021    SUBJECTIVE:  Demetrice King is a 63 y.o. male who presents to the clinic today for follow up to open incisional hernia repair with mesh, his wife is also present. Pt had some difficulty with postop pain control, better controlled when he was switched to percocet. Drainage output has decreased to almost zero, no other issues. OBJECTIVE:    Resp 12   Ht 6' 2\" (1.88 m)   Wt 252 lb (114.3 kg)   BMI 32.35 kg/m²     General Appearance:  awake, alert, no acute distress, well developed, well nourished   Skin:  Skin color, texture, turgor normal. No rashes or lesions. Lungs:  Normal chest expansion, unlabored breathing without accessory muscle use. No audible rales, rhonchi, or wheezing. Cardiovascular: S1S2. No evidence of JVD. No evidence of pulsatile masses in abdomen  Abdomen:  Soft, non-tender, no organomegaly, no masses. Incision C/D/I. Drain intact with dark sanguinous drainage consistent with resolving hematoma. Musculoskeletal: No evidence of bony/muscular deformities, trauma, atrophy of either left/right upper/lower extremity. No evidence of digital clubbing or cyanosis. ASSESSMENT:  1. Incisional hernia, without obstruction or gangrene          PLAN:  1. Drain removed in clinic without issue. Pt instructed to continue local hygiene to incision and drain site with soap and water and keep covered until drain site is closed  2. Lifting restriction to less than 20lbs for the next 4 weeks, unrestricted after this.   3. F/u prn    Electronically signed by Misty Bailey DO on 12/13/2021 at 1:26 PM

## 2021-12-23 DIAGNOSIS — I10 ESSENTIAL HYPERTENSION: ICD-10-CM

## 2021-12-23 RX ORDER — LISINOPRIL 10 MG/1
TABLET ORAL
Qty: 90 TABLET | Refills: 1 | Status: SHIPPED | OUTPATIENT
Start: 2021-12-23 | End: 2022-01-13 | Stop reason: SDUPTHER

## 2022-01-13 ENCOUNTER — OFFICE VISIT (OUTPATIENT)
Dept: FAMILY MEDICINE CLINIC | Age: 62
End: 2022-01-13
Payer: COMMERCIAL

## 2022-01-13 VITALS
DIASTOLIC BLOOD PRESSURE: 90 MMHG | BODY MASS INDEX: 31.2 KG/M2 | HEART RATE: 90 BPM | TEMPERATURE: 96.9 F | SYSTOLIC BLOOD PRESSURE: 150 MMHG | OXYGEN SATURATION: 98 % | WEIGHT: 243 LBS

## 2022-01-13 DIAGNOSIS — E78.5 DYSLIPIDEMIA: ICD-10-CM

## 2022-01-13 DIAGNOSIS — K43.2 INCISIONAL HERNIA OF ANTERIOR ABDOMINAL WALL WITHOUT OBSTRUCTION OR GANGRENE: ICD-10-CM

## 2022-01-13 DIAGNOSIS — G43.719 INTRACTABLE CHRONIC MIGRAINE WITHOUT AURA AND WITHOUT STATUS MIGRAINOSUS: ICD-10-CM

## 2022-01-13 DIAGNOSIS — I10 ESSENTIAL HYPERTENSION: Primary | ICD-10-CM

## 2022-01-13 PROCEDURE — 99214 OFFICE O/P EST MOD 30 MIN: CPT | Performed by: FAMILY MEDICINE

## 2022-01-13 RX ORDER — LISINOPRIL 20 MG/1
20 TABLET ORAL DAILY
Qty: 90 TABLET | Refills: 1 | Status: SHIPPED | OUTPATIENT
Start: 2022-01-13 | End: 2022-02-23

## 2022-01-13 ASSESSMENT — ENCOUNTER SYMPTOMS
ALLERGIC/IMMUNOLOGIC NEGATIVE: 1
SCALP TENDERNESS: 0
VOMITING: 0
RHINORRHEA: 0
EYE PAIN: 0
SHORTNESS OF BREATH: 0
FACIAL SWEATING: 0
EYE WATERING: 0
VISUAL CHANGE: 0
COUGH: 0
NAUSEA: 1
SORE THROAT: 0
ORTHOPNEA: 0
EYE REDNESS: 0
PHOTOPHOBIA: 1
SINUS PRESSURE: 0
SWOLLEN GLANDS: 0
RESPIRATORY NEGATIVE: 1
BLURRED VISION: 0

## 2022-01-13 ASSESSMENT — PATIENT HEALTH QUESTIONNAIRE - PHQ9
2. FEELING DOWN, DEPRESSED OR HOPELESS: 0
SUM OF ALL RESPONSES TO PHQ QUESTIONS 1-9: 0
SUM OF ALL RESPONSES TO PHQ QUESTIONS 1-9: 0
SUM OF ALL RESPONSES TO PHQ9 QUESTIONS 1 & 2: 0
SUM OF ALL RESPONSES TO PHQ QUESTIONS 1-9: 0
1. LITTLE INTEREST OR PLEASURE IN DOING THINGS: 0
SUM OF ALL RESPONSES TO PHQ QUESTIONS 1-9: 0

## 2022-01-13 NOTE — PROGRESS NOTES
sweats. Past treatments include ACE inhibitors. The current treatment provides mild improvement. There is no history of chronic renal disease. Migraine   This is a chronic problem. The current episode started more than 1 year ago. The problem occurs intermittently. The problem has been gradually improving. The pain is located in the occipital region. The pain quality is similar to prior headaches. The quality of the pain is described as throbbing. The pain is severe. Associated symptoms include nausea, phonophobia and photophobia. Pertinent negatives include no abnormal behavior, anorexia, blurred vision, coughing, dizziness, drainage, ear pain, eye pain, eye redness, eye watering, facial sweating, hearing loss, insomnia, loss of balance, muscle aches, neck pain, rhinorrhea, scalp tenderness, seizures, sinus pressure, sore throat, swollen glands, tinnitus, visual change or vomiting. He has tried triptans (aimovig, botox) for the symptoms. The treatment provided moderate relief. His past medical history is significant for obesity. Hyperlipidemia  This is a chronic problem. The current episode started more than 1 year ago. The problem is controlled. Recent lipid tests were reviewed and are normal. Exacerbating diseases include obesity. He has no history of chronic renal disease, diabetes, hypothyroidism, liver disease or nephrotic syndrome. Pertinent negatives include no chest pain, focal sensory loss, focal weakness, myalgias or shortness of breath. Current antihyperlipidemic treatment includes statins. The current treatment provides significant improvement of lipids. Review of Systems   Review of Systems   Constitutional: Negative. Negative for malaise/fatigue. HENT: Negative. Negative for ear pain, hearing loss, rhinorrhea, sinus pressure, sore throat and tinnitus. Eyes: Positive for photophobia. Negative for blurred vision, pain and redness. Respiratory: Negative.   Negative for cough and shortness of breath. Cardiovascular: Negative. Negative for chest pain, palpitations, orthopnea and PND. Gastrointestinal: Positive for nausea. Negative for anorexia and vomiting. Endocrine: Negative. Genitourinary: Negative. Musculoskeletal: Negative. Negative for myalgias and neck pain. Skin: Negative. Allergic/Immunologic: Negative. Neurological: Negative. Negative for dizziness, focal weakness, seizures, headaches and loss of balance. Hematological: Negative. Psychiatric/Behavioral: Negative. The patient does not have insomnia. All other systems reviewed and are negative. Medications     Current Outpatient Medications   Medication Sig Dispense Refill    lisinopril (PRINIVIL;ZESTRIL) 20 MG tablet Take 1 tablet by mouth daily 90 tablet 1    docusate sodium (COLACE) 100 MG capsule Take 1 capsule by mouth 2 times daily 20 capsule 0    NURTEC 75 MG TBDP Take every other day for prophylaxis 15 tablet 5    rizatriptan (MAXALT) 10 MG tablet take 1 tablet by mouth ONCE if needed for migraines may repeat in 2 hours if needed 12 tablet 5    AIMOVIG 140 MG/ML SOAJ inject 140 milligram INTO THE SKIN EVERY 30 DAYS 1 mL 5    atorvastatin (LIPITOR) 20 MG tablet TAKE 1 TABLET DAILY 90 tablet 1    amitriptyline (ELAVIL) 25 MG tablet Take 3 tablets by mouth nightly 90 tablet 5     No current facility-administered medications for this visit. Past History    Past Medical History:   has a past medical history of COVID-19, Diverticular disease of colon, Hyperlipidemia, Hypertension, and Pulmonary emboli ([address] Social History:   reports that he quit smoking about 13 years ago. His smoking use included cigarettes. He has a 23.00 pack-year smoking history. He has never used smokeless tobacco. He reports current alcohol use. He reports that he does not use drugs.      Family History:   Family History   Problem Relation Age of Onset    Cancer Mother         Breast CA       Surgical History:   Past Surgical History:   Procedure Laterality Date    COLECTOMY  2/1/15        HERNIA REPAIR  1990's    HERNIA REPAIR  12/03/2021     HERNIA INCISIONAL REPAIR LAPAROSCOPIC ROBOTIC WITH MESH    JOINT REPLACEMENT  2009    L TKA    REVISION COLOSTOMY      SIGMOIDECTOMY  2/1/15    [facility]/ Dr. Chay Ron N/A 12/3/2021    HERNIA Kermitt Habermann TO OPEN AT 15:20 performed by Uche Diaz DO at [address] [facility].        Physical Examination      Vitals:  BP (!) 150/90 (Site: Left Upper Arm, Position: Sitting, Cuff Size: Medium Adult)   Pulse 90   Temp 96.9 °F (36.1 °C) (Temporal)   Wt 243 lb (110.2 kg)   SpO2 98%   BMI 31.20 kg/m²     Physical Exam  Vitals and nursing note reviewed. Constitutional:       General: He is not in acute distress. Appearance: Normal appearance. He is obese. He is not ill-appearing, toxic-appearing or diaphoretic. HENT:      Head: Normocephalic and atraumatic. Right Ear: External ear normal.      Left Ear: External ear normal.   Eyes:      General: No scleral icterus. Right eye: No discharge. Left eye: No discharge. Conjunctiva/sclera: Conjunctivae normal.   Cardiovascular:      Rate and Rhythm: Normal rate and regular rhythm. Pulses: Normal pulses. Heart sounds: Normal heart sounds. No murmur heard. No friction rub. No gallop. Pulmonary:      Effort: Pulmonary effort is normal. No respiratory distress. Breath sounds: Normal breath sounds. No stridor. No wheezing, rhonchi or rales. Chest:      Chest wall: No tenderness. Abdominal:      General: Abdomen is protuberant. There is no distension or abdominal bruit. There are no signs of injury. Palpations: There is no shifting dullness, fluid wave, hepatomegaly, splenomegaly, mass or pulsatile mass. Tenderness: There is abdominal tenderness in the epigastric area.       Hernia: A hernia (s/p repair) is present. Skin:     General: Skin is warm. Coloration: Skin is not jaundiced or pale. Neurological:      Mental Status: He is alert and oriented to person, place, and time. Mental status is at baseline. Psychiatric:         Mood and Affect: Mood normal.         Behavior: Behavior normal.         Thought Content: Thought content normal.         Judgment: Judgment normal.         Labs/Imaging/Diagnostics   Labs:  No results found for: CMPWITHGFR, CBCAUTODIF, TSHFT4, LABA1C, LIPIDPAN    Imaging Last 24 Hours:  CT ABDOMEN PELVIS W IV CONTRAST  Narrative: EXAMINATION:  CT OF THE ABDOMEN AND PELVIS WITH CONTRAST 11/5/2021 9:27 am    TECHNIQUE:  CT of the abdomen and pelvis was performed with the administration of  intravenous contrast. Multiplanar reformatted images are provided for review. Dose modulation, iterative reconstruction, and/or weight based adjustment of  the mA/kV was utilized to reduce the radiation dose to as low as reasonably  achievable. COMPARISON:  04/21/2008    HISTORY:  ORDERING SYSTEM PROVIDED HISTORY: Ventral hernia without obstruction or  gangrene  TECHNOLOGIST PROVIDED HISTORY:  Order changed to with contrast per radiologist request  Reason for Exam: abd pain with swelling  Acuity: Chronic  Type of Exam: Initial  Additional signs and symptoms: possible hernia from surgery x6 yrs ago  Relevant Medical/Surgical History: hx HTN    FINDINGS:  Lower Chest: No acute findings. Organs: 1.6 cm liver lesion in the subcapsular right hepatic lobe on image 26  is indeterminate on this exam but appears stable in size and appearance  compared to the 2008 exam.  A probable subcapsular cyst in the anterior left  hepatic lobe was not clearly demonstrated on the prior exam, which may be due  to technical differences. Small gallstones are noted. The pancreas, spleen,  adrenals and kidneys reveal no significant findings. Right renal cyst.    GI/Bowel:  There is no bowel dilatation or wall thickening identified. Status  post partial sigmoid resection. A short segment of transverse colon extends  into the supraumbilical hernia. This hernia has a neck of 6.5 cm. No wall  thickening or inflammatory change in this area. Pelvis: No acute findings. Mild prostatomegaly. Peritoneum/Retroperitoneum: Infrarenal aortic enlargement measuring 2.8 x 2.6  cm. No free air. No ascites. Few small external iliac chain lymph nodes  are present bilaterally, which appears stable compared to the 2008 exam  consistent with a benign process. .    Bones/Soft Tissues: Status post left acetabular repair no acute osseous  abnormality identified. Impression: 1. Supraumbilical abdominal wall hernia containing a portion of transverse  colon. No inflammatory process or obstruction. 2.  Stable 1.6 cm subcapsular liver lesion in the right hepatic lobe as  compared to 2008 imaging, consistent with a benign process. Probable  subcentimeter cysts in the left hepatic lobe. 3.  Cholelithiasis. 4.  Infrarenal aortic enlargement measuring up to 2.8 cm, for which imaging  follow-up in 5 years is recommended. RECOMMENDATIONS:  For management of fusiform AAA:    2.6-2.9 cm aorta, recommend follow-up every 5 years or aortas meeting the  criteria for AAA (>1.5 x proximal normal segment; no f/u if < 1.5 x proximal  normal segment; no f/u for aortas < 2.6 cm). Note: Recommend Vascular consultation if a fusiform AAA enlarges by >0.5 cm  in 6 months or >1 cm in 1 year or for a saccular AAA of any size. References: Naoma Baljinder Radiol 2013; 68(90):986-750; J Vasc Surg.  2018; 67:2-77

## 2022-01-13 NOTE — PATIENT INSTRUCTIONS
Patient Education        DASH Diet: Care Instructions  Your Care Instructions     The DASH diet is an eating plan that can help lower your blood pressure. DASH stands for Dietary Approaches to Stop Hypertension. Hypertension is high blood pressure. The DASH diet focuses on eating foods that are high in calcium, potassium, and magnesium. These nutrients can lower blood pressure. The foods that are highest in these nutrients are fruits, vegetables, low-fat dairy products, nuts, seeds, and legumes. But taking calcium, potassium, and magnesium supplements instead of eating foods that are high in those nutrients does not have the same effect. The DASH diet also includes whole grains, fish, and poultry. The DASH diet is one of several lifestyle changes your doctor may recommend to lower your high blood pressure. Your doctor may also want you to decrease the amount of sodium in your diet. Lowering sodium while following the DASH diet can lower blood pressure even further than just the DASH diet alone. Follow-up care is a key part of your treatment and safety. Be sure to make and go to all appointments, and call your doctor if you are having problems. It's also a good idea to know your test results and keep a list of the medicines you take. How can you care for yourself at home? Following the DASH diet  · Eat 4 to 5 servings of fruit each day. A serving is 1 medium-sized piece of fruit, ½ cup chopped or canned fruit, 1/4 cup dried fruit, or 4 ounces (½ cup) of fruit juice. Choose fruit more often than fruit juice. · Eat 4 to 5 servings of vegetables each day. A serving is 1 cup of lettuce or raw leafy vegetables, ½ cup of chopped or cooked vegetables, or 4 ounces (½ cup) of vegetable juice. Choose vegetables more often than vegetable juice. · Get 2 to 3 servings of low-fat and fat-free dairy each day. A serving is 8 ounces of milk, 1 cup of yogurt, or 1 ½ ounces of cheese. · Eat 6 to 8 servings of grains each day.  A serving is 1 slice of bread, 1 ounce of dry cereal, or ½ cup of cooked rice, pasta, or cooked cereal. Try to choose whole-grain products as much as possible. · Limit lean meat, poultry, and fish to 2 servings each day. A serving is 3 ounces, about the size of a deck of cards. · Eat 4 to 5 servings of nuts, seeds, and legumes (cooked dried beans, lentils, and split peas) each week. A serving is 1/3 cup of nuts, 2 tablespoons of seeds, or ½ cup of cooked beans or peas. · Limit fats and oils to 2 to 3 servings each day. A serving is 1 teaspoon of vegetable oil or 2 tablespoons of salad dressing. · Limit sweets and added sugars to 5 servings or less a week. A serving is 1 tablespoon jelly or jam, ½ cup sorbet, or 1 cup of lemonade. · Eat less than 2,300 milligrams (mg) of sodium a day. If you limit your sodium to 1,500 mg a day, you can lower your blood pressure even more. · Be aware that all of these are the suggested number of servings for people who eat 1,800 to 2,000 calories a day. Your recommended number of servings may be different if you need more or fewer calories. Tips for success  · Start small. Do not try to make dramatic changes to your diet all at once. You might feel that you are missing out on your favorite foods and then be more likely to not follow the plan. Make small changes, and stick with them. Once those changes become habit, add a few more changes. · Try some of the following:  ? Make it a goal to eat a fruit or vegetable at every meal and at snacks. This will make it easy to get the recommended amount of fruits and vegetables each day. ? Try yogurt topped with fruit and nuts for a snack or healthy dessert. ? Add lettuce, tomato, cucumber, and onion to sandwiches. ? Combine a ready-made pizza crust with low-fat mozzarella cheese and lots of vegetable toppings. Try using tomatoes, squash, spinach, broccoli, carrots, cauliflower, and onions. ?  Have a variety of cut-up vegetables with a low-fat dip as an appetizer instead of chips and dip. ? Sprinkle sunflower seeds or chopped almonds over salads. Or try adding chopped walnuts or almonds to cooked vegetables. ? Try some vegetarian meals using beans and peas. Add garbanzo or kidney beans to salads. Make burritos and tacos with mashed ash beans or black beans. Where can you learn more? Go to https://Viewpoints.[employer]. org and sign in to your [employer] account. Enter S669 in the [employer] box to learn more about \"DASH Diet: Care Instructions. \"     If you do not have an account, please click on the \"Sign Up Now\" link. Current as of: April 29, 2021               Content Version: 13.1  © 5930-2243 [employer]wise, [employer]. Care instructions adapted under license by [facility] ([facility]). If you have questions about a medical condition or this instruction, always ask your healthcare professional. [address] any warranty or liability for your use of this information.

## 2022-01-13 NOTE — ASSESSMENT & PLAN NOTE
Uncontrolled, changes made today: increase Lisinopril to 20mg daily, medication adherence emphasized and lifestyle modifications recommended

## 2022-02-02 ENCOUNTER — TELEPHONE (OUTPATIENT)
Dept: NEUROLOGY | Age: 62
End: 2022-02-02

## 2022-02-02 NOTE — TELEPHONE ENCOUNTER
Received a fax from pharmacy stating 14 [facility] needs PA. This was completed on CMM.

## 2022-02-02 NOTE — TELEPHONE ENCOUNTER
Sent in [id number] [facility] today for [facility].

## 2022-02-08 NOTE — TELEPHONE ENCOUNTER
He was supposed to get Botox, but his insurance changed. Can we find out what is going on with that.   Also if he is still taking the Aimovig I will have to prescribe Ajovy

## 2022-02-22 NOTE — TELEPHONE ENCOUNTER
[address] [facility] at [phone]. I was transferred to [phone]. Spoke with Enriqueta ADILY. She advised that his plan does not require a prior authorization. She said we can submit a predetermination on their form. I called [facility] and he said he would like us to do a Pre D. Faxed information on their form. Call ref.  # [id number]

## 2022-02-23 ENCOUNTER — OFFICE VISIT (OUTPATIENT)
Dept: FAMILY MEDICINE CLINIC | Age: 62
End: 2022-02-23
Payer: COMMERCIAL

## 2022-02-23 VITALS
DIASTOLIC BLOOD PRESSURE: 90 MMHG | OXYGEN SATURATION: 98 % | BODY MASS INDEX: 29.5 KG/M2 | WEIGHT: 229.8 LBS | HEART RATE: 79 BPM | SYSTOLIC BLOOD PRESSURE: 130 MMHG

## 2022-02-23 DIAGNOSIS — I10 ESSENTIAL HYPERTENSION: Primary | ICD-10-CM

## 2022-02-23 DIAGNOSIS — H91.92 HEARING LOSS OF LEFT EAR, UNSPECIFIED HEARING LOSS TYPE: ICD-10-CM

## 2022-02-23 DIAGNOSIS — E78.5 DYSLIPIDEMIA: ICD-10-CM

## 2022-02-23 DIAGNOSIS — G43.719 INTRACTABLE CHRONIC MIGRAINE WITHOUT AURA AND WITHOUT STATUS MIGRAINOSUS: ICD-10-CM

## 2022-02-23 DIAGNOSIS — E66.3 OVERWEIGHT (BMI 25.0-29.9): ICD-10-CM

## 2022-02-23 PROCEDURE — 99214 OFFICE O/P EST MOD 30 MIN: CPT | Performed by: FAMILY MEDICINE

## 2022-02-23 RX ORDER — LISINOPRIL 30 MG/1
30 TABLET ORAL DAILY
Qty: 90 TABLET | Refills: 1 | Status: SHIPPED | OUTPATIENT
Start: 2022-02-23 | End: 2022-05-31 | Stop reason: SDUPTHER

## 2022-02-23 ASSESSMENT — ENCOUNTER SYMPTOMS
SORE THROAT: 0
RESPIRATORY NEGATIVE: 1
VISUAL CHANGE: 0
SCALP TENDERNESS: 0
BLURRED VISION: 0
EYE WATERING: 0
SINUS PRESSURE: 0
FACIAL SWEATING: 0
PHOTOPHOBIA: 1
RHINORRHEA: 0
NAUSEA: 1
COUGH: 0
SWOLLEN GLANDS: 0
VOMITING: 0
EYE REDNESS: 0
ORTHOPNEA: 0
EYE PAIN: 0
SHORTNESS OF BREATH: 0
ALLERGIC/IMMUNOLOGIC NEGATIVE: 1

## 2022-02-23 ASSESSMENT — PATIENT HEALTH QUESTIONNAIRE - PHQ9
2. FEELING DOWN, DEPRESSED OR HOPELESS: 0
SUM OF ALL RESPONSES TO PHQ QUESTIONS 1-9: 0
SUM OF ALL RESPONSES TO PHQ9 QUESTIONS 1 & 2: 0
SUM OF ALL RESPONSES TO PHQ QUESTIONS 1-9: 0
1. LITTLE INTEREST OR PLEASURE IN DOING THINGS: 0

## 2022-02-23 NOTE — ASSESSMENT & PLAN NOTE
Borderline controlled, changes made today: increase Lisinopril to 30mg, medication adherence emphasized and lifestyle modifications recommended

## 2022-02-23 NOTE — PROGRESS NOTES
[facility] Progress Note    Date:2/23/2022         Patient Bethel Mata     YOB: 1960     Age:61 y.o. Assessment/Plan        Problem List Items Addressed This Visit        Circulatory    Essential hypertension - Primary      Borderline controlled, changes made today: increase Lisinopril to 30mg, medication adherence emphasized and lifestyle modifications recommended         Relevant Medications    lisinopril (PRINIVIL;ZESTRIL) 30 MG tablet       Nervous and Auditory    Intractable chronic migraine without aura and without status migrainosus      Monitored by specialist- no acute findings meriting change in the plan         Hearing loss of left ear      refer to ENT         Relevant Orders    [facility] - Siomara Ibarra MD, Otolaryngology, [place]       Other    Dyslipidemia      Well-controlled, continue current medications, continue current treatment plan, medication adherence emphasized and lifestyle modifications recommended         Overweight (BMI 25.0-29. 9)     ·  I generally recommend that people of all ages try to get 150 minutes of physical activity per week and it doesnt matter how this totals up, in other words 30 minutes 5 days per week is as good as 50 minutes 3 days a week and so on. The level of activity should be such that it is able to get your heart rate up to 100 or more, for example a brisk walk should achieve this rate. · In terms of diet, I generally recommend low-carb and low-fat, trying to avoid processed foods wherever possible (anything that comes in a can or a box) which can be achieved by sticking to the outside walls of the grocery store where generally you will find fresh fruits/vegetables, meats, dairy, and frozen foods. · Try to avoid starches in the diet where possible and minimize bread, rice, potatoes, and pasta in the diet.   Specifically try to avoid gluten, which even in people that dont have a deangelo allergy, causes havoc in the small intestine and alters absorption of nutrients which can in turn lead to obesity. Return in about 3 months (around 5/23/2022). Electronically signed by Haritha Costello DO on 2/23/22       Total time spent was between Time personally spent assessing and managing the patient on the date of service: Est: 30-39 minutes (06253) mins. This included time spent reviewing the patient's medical record (e.g., recent visits, labs, and studies); seeing the patient in the office (face-to-face time); ordering medications, studies, procedures, or referrals; calling the patient or family later in the day with results and further recommendations; and documenting the visit in the medical record. Brad Colorado is a 63 y.o. male presenting today for   Chief Complaint   Patient presents with    1 Month Follow-Up   . Hearing Loss  Patient presents today with complaints of hearing loss. Onset of symptoms was sudden, beginning approximately a few weeks ago. Symptoms have been unchanged since that time. Symptoms include: hearing loss involving the left ear. Patient denies bleeding or drainage. There is a history of noise exposure shooting guns with his wife - he gave her the ear muffs and he used ear plugs. Hypertension  This is a new problem. The current episode started more than 1 month ago. The problem has been gradually worsening since onset. The problem is uncontrolled. Pertinent negatives include no anxiety, blurred vision, chest pain, headaches, malaise/fatigue, neck pain, orthopnea, palpitations, peripheral edema, PND, shortness of breath or sweats. Past treatments include ACE inhibitors. The current treatment provides mild improvement. There is no history of chronic renal disease. Migraine   This is a chronic problem. The current episode started more than 1 year ago. The problem occurs intermittently. The problem has been gradually improving. The pain is located in the occipital region.  The pain quality is similar to prior headaches. The quality of the pain is described as throbbing. The pain is severe. Associated symptoms include nausea, phonophobia and photophobia. Pertinent negatives include no abnormal behavior, anorexia, blurred vision, coughing, dizziness, drainage, ear pain, eye pain, eye redness, eye watering, facial sweating, hearing loss, insomnia, loss of balance, muscle aches, neck pain, rhinorrhea, scalp tenderness, seizures, sinus pressure, sore throat, swollen glands, tinnitus, visual change or vomiting. He has tried triptans (aimovig, botox) for the symptoms. The treatment provided moderate relief. His past medical history is significant for obesity. Hyperlipidemia  This is a chronic problem. The current episode started more than 1 year ago. The problem is controlled. Recent lipid tests were reviewed and are normal. Exacerbating diseases include obesity. He has no history of chronic renal disease, diabetes, hypothyroidism, liver disease or nephrotic syndrome. Pertinent negatives include no chest pain, focal sensory loss, focal weakness, myalgias or shortness of breath. Current antihyperlipidemic treatment includes statins. The current treatment provides significant improvement of lipids. Review of Systems   Review of Systems   Constitutional: Negative. Negative for malaise/fatigue. HENT: Negative. Negative for ear pain, hearing loss, rhinorrhea, sinus pressure, sore throat and tinnitus. Eyes: Positive for photophobia. Negative for blurred vision, pain and redness. Respiratory: Negative. Negative for cough and shortness of breath. Cardiovascular: Negative. Negative for chest pain, palpitations, orthopnea and PND. Gastrointestinal: Positive for nausea. Negative for anorexia and vomiting. Endocrine: Negative. Genitourinary: Negative. Musculoskeletal: Negative. Negative for myalgias and neck pain. Skin: Negative. Allergic/Immunologic: Negative. Neurological: Negative. Negative for dizziness, focal weakness, seizures, headaches and loss of balance. Hematological: Negative. Psychiatric/Behavioral: Negative. The patient does not have insomnia. All other systems reviewed and are negative. Medications     Current Outpatient Medications   Medication Sig Dispense Refill    lisinopril (PRINIVIL;ZESTRIL) 30 MG tablet Take 1 tablet by mouth daily 90 tablet 1    docusate sodium (COLACE) 100 MG capsule Take 1 capsule by mouth 2 times daily 20 capsule 0    NURTEC 75 MG TBDP Take every other day for prophylaxis 15 tablet 5    rizatriptan (MAXALT) 10 MG tablet take 1 tablet by mouth ONCE if needed for migraines may repeat in 2 hours if needed 12 tablet 5    AIMOVIG 140 MG/ML SOAJ inject 140 milligram INTO THE SKIN EVERY 30 DAYS 1 mL 5    atorvastatin (LIPITOR) 20 MG tablet TAKE 1 TABLET DAILY 90 tablet 1    amitriptyline (ELAVIL) 25 MG tablet Take 3 tablets by mouth nightly 90 tablet 5     No current facility-administered medications for this visit. Past History    Past Medical History:   has a past medical history of COVID-19, Diverticular disease of colon, Hyperlipidemia, Hypertension, and Pulmonary emboli ([facility] [address] Social History:   reports that he quit smoking about 13 years ago. His smoking use included cigarettes. He has a 23.00 pack-year smoking history. He has never used smokeless tobacco. He reports current alcohol use. He reports that he does not use drugs.      Family History:   Family History   Problem Relation Age of Onset    Cancer Mother         Breast CA       Surgical History:   Past Surgical History:   Procedure Laterality Date    COLECTOMY  2/1/15        HERNIA REPAIR  1990's    HERNIA REPAIR  12/03/2021     HERNIA INCISIONAL REPAIR LAPAROSCOPIC ROBOTIC WITH MESH    JOINT REPLACEMENT  2009    L [facility]    REVISION COLOSTOMY      SIGMOIDECTOMY  2/1/15    [facility]/ Dr. Manuel White 12/3/2021    HERNIA INCISIONAL REPAIR LAPAROSCOPIC ROBOTIC WITH MESH CONVERTED TO OPEN AT 15:20 performed by Ruddy Gill DO at [address] [facility].        Physical Examination      Vitals:  BP (!) 130/90   Pulse 79   Wt 229 lb 12.8 oz (104.2 kg)   SpO2 98%   BMI 29.50 kg/m²     Physical Exam  Vitals and nursing note reviewed. Constitutional:       General: He is not in acute distress. Appearance: Normal appearance. He is overweight. He is not ill-appearing, toxic-appearing or diaphoretic. HENT:      Head: Normocephalic and atraumatic. Right Ear: Hearing, tympanic membrane, ear canal and external ear normal.      Left Ear: Tympanic membrane, ear canal and external ear normal. Decreased hearing noted. Eyes:      General: No scleral icterus. Right eye: No discharge. Left eye: No discharge. Conjunctiva/sclera: Conjunctivae normal.   Cardiovascular:      Rate and Rhythm: Normal rate and regular rhythm. Pulses: Normal pulses. Heart sounds: Normal heart sounds. No murmur heard. No friction rub. No gallop. Pulmonary:      Effort: Pulmonary effort is normal. No respiratory distress. Breath sounds: Normal breath sounds. No stridor. No wheezing, rhonchi or rales. Chest:      Chest wall: No tenderness. Skin:     General: Skin is warm. Coloration: Skin is not jaundiced or pale. Neurological:      Mental Status: He is alert and oriented to person, place, and time. Mental status is at baseline. Psychiatric:         Mood and Affect: Mood normal.         Behavior: Behavior normal.         Thought Content:  Thought content normal.         Judgment: Judgment normal.         Labs/Imaging/Diagnostics   Labs:  No results found for: CMPWITHGFR, CBCAUTODIF, TSHFT4, LABA1C, LIPIDPAN    Imaging Last 24 Hours:  CT ABDOMEN PELVIS W IV CONTRAST  Narrative: EXAMINATION:  CT OF THE ABDOMEN AND PELVIS WITH CONTRAST 11/5/2021 9:27 am    TECHNIQUE:  CT of the abdomen and pelvis was performed with the administration of  intravenous contrast. Multiplanar reformatted images are provided for review. Dose modulation, iterative reconstruction, and/or weight based adjustment of  the mA/kV was utilized to reduce the radiation dose to as low as reasonably  achievable. COMPARISON:  04/21/2008    HISTORY:  ORDERING SYSTEM PROVIDED HISTORY: Ventral hernia without obstruction or  gangrene  TECHNOLOGIST PROVIDED HISTORY:  Order changed to with contrast per radiologist request  Reason for Exam: abd pain with swelling  Acuity: Chronic  Type of Exam: Initial  Additional signs and symptoms: possible hernia from surgery x6 yrs ago  Relevant Medical/Surgical History: hx HTN    FINDINGS:  Lower Chest: No acute findings. Organs: 1.6 cm liver lesion in the subcapsular right hepatic lobe on image 26  is indeterminate on this exam but appears stable in size and appearance  compared to the 2008 exam.  A probable subcapsular cyst in the anterior left  hepatic lobe was not clearly demonstrated on the prior exam, which may be due  to technical differences. Small gallstones are noted. The pancreas, spleen,  adrenals and kidneys reveal no significant findings. Right renal cyst.    GI/Bowel: There is no bowel dilatation or wall thickening identified. Status  post partial sigmoid resection. A short segment of transverse colon extends  into the supraumbilical hernia. This hernia has a neck of 6.5 cm. No wall  thickening or inflammatory change in this area. Pelvis: No acute findings. Mild prostatomegaly. Peritoneum/Retroperitoneum: Infrarenal aortic enlargement measuring 2.8 x 2.6  cm. No free air. No ascites. Few small external iliac chain lymph nodes  are present bilaterally, which appears stable compared to the 2008 exam  consistent with a benign process. .    Bones/Soft Tissues: Status post left acetabular repair no acute osseous  abnormality identified. Impression: 1.   Supraumbilical abdominal wall hernia containing a portion of transverse  colon. No inflammatory process or obstruction. 2.  Stable 1.6 cm subcapsular liver lesion in the right hepatic lobe as  compared to 2008 imaging, consistent with a benign process. Probable  subcentimeter cysts in the left hepatic lobe. 3.  Cholelithiasis. 4.  Infrarenal aortic enlargement measuring up to 2.8 cm, for which imaging  follow-up in 5 years is recommended. RECOMMENDATIONS:  For management of fusiform AAA:    2.6-2.9 cm aorta, recommend follow-up every 5 years or aortas meeting the  criteria for AAA (>1.5 x proximal normal segment; no f/u if < 1.5 x proximal  normal segment; no f/u for aortas < 2.6 cm). Note: Recommend Vascular consultation if a fusiform AAA enlarges by >0.5 cm  in 6 months or >1 cm in 1 year or for a saccular AAA of any size. References: Dariusz Taveras Radiol 2013; 13(51):818-811; J Vasc Surg.  2018; 67:2-77

## 2022-02-23 NOTE — ASSESSMENT & PLAN NOTE
·  I generally recommend that people of all ages try to get 150 minutes of physical activity per week and it doesnt matter how this totals up, in other words 30 minutes 5 days per week is as good as 50 minutes 3 days a week and so on. The level of activity should be such that it is able to get your heart rate up to 100 or more, for example a brisk walk should achieve this rate. · In terms of diet, I generally recommend low-carb and low-fat, trying to avoid processed foods wherever possible (anything that comes in a can or a box) which can be achieved by sticking to the outside walls of the grocery store where generally you will find fresh fruits/vegetables, meats, dairy, and frozen foods. · Try to avoid starches in the diet where possible and minimize bread, rice, potatoes, and pasta in the diet. Specifically try to avoid gluten, which even in people that dont have a deangelo allergy, causes havoc in the small intestine and alters absorption of nutrients which can in turn lead to obesity.

## 2022-02-23 NOTE — PATIENT INSTRUCTIONS
Patient Education        DASH Diet: Care Instructions  Your Care Instructions     The DASH diet is an eating plan that can help lower your blood pressure. DASH stands for Dietary Approaches to Stop Hypertension. Hypertension is high blood pressure. The DASH diet focuses on eating foods that are high in calcium, potassium, and magnesium. These nutrients can lower blood pressure. The foods that are highest in these nutrients are fruits, vegetables, low-fat dairy products, nuts, seeds, and legumes. But taking calcium, potassium, and magnesium supplements instead of eating foods that are high in those nutrients does not have the same effect. The DASH diet also includes whole grains, fish, and poultry. The DASH diet is one of several lifestyle changes your doctor may recommend to lower your high blood pressure. Your doctor may also want you to decrease the amount of sodium in your diet. Lowering sodium while following the DASH diet can lower blood pressure even further than just the DASH diet alone. Follow-up care is a key part of your treatment and safety. Be sure to make and go to all appointments, and call your doctor if you are having problems. It's also a good idea to know your test results and keep a list of the medicines you take. How can you care for yourself at home? Following the DASH diet  · Eat 4 to 5 servings of fruit each day. A serving is 1 medium-sized piece of fruit, ½ cup chopped or canned fruit, 1/4 cup dried fruit, or 4 ounces (½ cup) of fruit juice. Choose fruit more often than fruit juice. · Eat 4 to 5 servings of vegetables each day. A serving is 1 cup of lettuce or raw leafy vegetables, ½ cup of chopped or cooked vegetables, or 4 ounces (½ cup) of vegetable juice. Choose vegetables more often than vegetable juice. · Get 2 to 3 servings of low-fat and fat-free dairy each day. A serving is 8 ounces of milk, 1 cup of yogurt, or 1 ½ ounces of cheese. · Eat 6 to 8 servings of grains each day.  A serving is 1 slice of bread, 1 ounce of dry cereal, or ½ cup of cooked rice, pasta, or cooked cereal. Try to choose whole-grain products as much as possible. · Limit lean meat, poultry, and fish to 2 servings each day. A serving is 3 ounces, about the size of a deck of cards. · Eat 4 to 5 servings of nuts, seeds, and legumes (cooked dried beans, lentils, and split peas) each week. A serving is 1/3 cup of nuts, 2 tablespoons of seeds, or ½ cup of cooked beans or peas. · Limit fats and oils to 2 to 3 servings each day. A serving is 1 teaspoon of vegetable oil or 2 tablespoons of salad dressing. · Limit sweets and added sugars to 5 servings or less a week. A serving is 1 tablespoon jelly or jam, ½ cup sorbet, or 1 cup of lemonade. · Eat less than 2,300 milligrams (mg) of sodium a day. If you limit your sodium to 1,500 mg a day, you can lower your blood pressure even more. · Be aware that all of these are the suggested number of servings for people who eat 1,800 to 2,000 calories a day. Your recommended number of servings may be different if you need more or fewer calories. Tips for success  · Start small. Do not try to make dramatic changes to your diet all at once. You might feel that you are missing out on your favorite foods and then be more likely to not follow the plan. Make small changes, and stick with them. Once those changes become habit, add a few more changes. · Try some of the following:  ? Make it a goal to eat a fruit or vegetable at every meal and at snacks. This will make it easy to get the recommended amount of fruits and vegetables each day. ? Try yogurt topped with fruit and nuts for a snack or healthy dessert. ? Add lettuce, tomato, cucumber, and onion to sandwiches. ? Combine a ready-made pizza crust with low-fat mozzarella cheese and lots of vegetable toppings. Try using tomatoes, squash, spinach, broccoli, carrots, cauliflower, and onions. ?  Have a variety of cut-up vegetables with a low-fat dip as an appetizer instead of chips and dip. ? Sprinkle sunflower seeds or chopped almonds over salads. Or try adding chopped walnuts or almonds to cooked vegetables. ? Try some vegetarian meals using beans and peas. Add garbanzo or kidney beans to salads. Make burritos and tacos with mashed ash beans or black beans. Where can you learn more? Go to https://[employer]. org and sign in to your [employer] account. Enter X954 in the [employer] box to learn more about \"DASH Diet: Care Instructions. \"     If you do not have an account, please click on the \"Sign Up Now\" link. Current as of: April 29, 2021               Content Version: 13.1  © 5499-5434 Healthwise, Incorporated. Care instructions adapted under license by [facility] ([facility]). If you have questions about a medical condition or this instruction, always ask your healthcare professional. [address] any warranty or liability for your use of this information.

## 2022-03-17 DIAGNOSIS — E78.5 DYSLIPIDEMIA: ICD-10-CM

## 2022-03-17 RX ORDER — ATORVASTATIN CALCIUM 20 MG/1
TABLET, FILM COATED ORAL
Qty: 90 TABLET | Refills: 1 | Status: SHIPPED | OUTPATIENT
Start: 2022-03-17 | End: 2022-10-03

## 2022-03-17 NOTE — TELEPHONE ENCOUNTER
Jon Tse is calling to request a refill on the following medication(s):    Medication Request:  Requested Prescriptions     Pending Prescriptions Disp Refills    atorvastatin (LIPITOR) 20 MG tablet [Pharmacy Med Name: ATORVASTATIN TAB 20MG] 90 tablet 1     Sig: TAKE 1 TABLET DAILY       Last Visit Date (If Applicable):  7/53/4220    Next Visit Date:    Visit date not found

## 2022-03-28 NOTE — TELEPHONE ENCOUNTER
I faxed another request for pred in today. I tried to call Tiffani Donaldson but was on hold for 15 minutes.

## 2022-03-29 DIAGNOSIS — M51.16 LUMBAR DISC DISEASE WITH RADICULOPATHY: ICD-10-CM

## 2022-03-29 DIAGNOSIS — M54.42 ACUTE LEFT-SIDED LOW BACK PAIN WITH LEFT-SIDED SCIATICA: ICD-10-CM

## 2022-03-30 DIAGNOSIS — M51.16 LUMBAR DISC DISEASE WITH RADICULOPATHY: Primary | ICD-10-CM

## 2022-03-30 DIAGNOSIS — M54.42 ACUTE LEFT-SIDED LOW BACK PAIN WITH LEFT-SIDED SCIATICA: ICD-10-CM

## 2022-04-13 ENCOUNTER — TELEPHONE (OUTPATIENT)
Dept: NEUROLOGY | Age: 62
End: 2022-04-13

## 2022-04-13 NOTE — TELEPHONE ENCOUNTER
I called and left a message for Mario Sandoval explaining I had not heard anything from Sushil Clark after I sent the Pre D in. I wondered if he is still interested in Botox and if he had heard anything.

## 2022-04-13 NOTE — TELEPHONE ENCOUNTER
I called Chapin Victor to find out if he is still interested in betting Botox inj. because we had not received anything back on the Pre-D I faxed to his ins. He said that he is still wanting to receive Botox injections. He said that he did start taking the Nurtec every other day, every other week a while back because his headaches got bad. They have improved some but not great. He would be interested in switching to Ajovy if tanner BRITTANY still wants to do that since his insurance won't cover Aimovig anymore. He also said he would come in to see Cyril Conrad if she wanted to sort things out.

## 2022-04-14 NOTE — TELEPHONE ENCOUNTER
Yanet Zazueta called me back yesterday. He is still interested in Botox.  See telephone encounter 4/13/22

## 2022-04-18 RX ORDER — RIZATRIPTAN BENZOATE 10 MG/1
TABLET ORAL
Qty: 12 TABLET | Refills: 5 | Status: SHIPPED | OUTPATIENT
Start: 2022-04-18 | End: 2022-09-21

## 2022-04-18 NOTE — TELEPHONE ENCOUNTER
I would be happy to switch him to Ajovy, but he got such good results from Botox. Is it definite that he is unable to get Botox?

## 2022-04-18 NOTE — TELEPHONE ENCOUNTER
Pharmacy requesting refill of rizatriptan.       Medication active on med list yes      Date of last fill: 11/4/21  with 5 refills verified on 4/18/22  verified by MINAL GOMEZ      Date of last appointment 11/4/21    Next Visit Date:  Visit date not found

## 2022-05-03 ENCOUNTER — OFFICE VISIT (OUTPATIENT)
Dept: NEUROSURGERY | Age: 62
End: 2022-05-03
Payer: COMMERCIAL

## 2022-05-03 VITALS
OXYGEN SATURATION: 99 % | RESPIRATION RATE: 18 BRPM | HEIGHT: 74 IN | HEART RATE: 82 BPM | DIASTOLIC BLOOD PRESSURE: 95 MMHG | TEMPERATURE: 98 F | BODY MASS INDEX: 29.82 KG/M2 | SYSTOLIC BLOOD PRESSURE: 147 MMHG | WEIGHT: 232.4 LBS

## 2022-05-03 DIAGNOSIS — M47.26 OTHER SPONDYLOSIS WITH RADICULOPATHY, LUMBAR REGION: Primary | ICD-10-CM

## 2022-05-03 PROCEDURE — 99203 OFFICE O/P NEW LOW 30 MIN: CPT | Performed by: NURSE PRACTITIONER

## 2022-05-03 NOTE — PROGRESS NOTES
[address]  [facility] [place], [place] [address], [address] [facility] [address]  Dept: [phone]    Patient:  Reyna Gordillo  YOB: 1960  Date: 5/3/22    The patient is a 63 y.o. male who presents today for consult of the following problems:     Chief Complaint   Patient presents with    New Patient         HPI:     Reyna Gordillo is a 63 y.o. male on whom neurosurgical consultation was requested by Hubert Grimes DO for management of back and leg pain. Pt has had intermittent issues with low back pain for several years. Pain will typically occur to lower left back, and will travel down the posterior aspect of left leg. Episodes would occur around every 2-3 months. Utilizes hot tub, ice, ibuprofen to manage symptoms. Currently without pain. Completed several months of physical therapy last year, felt it was very helpful, continues HEP. Exacerbations have become less severe and less frequent. Reports it has been several months since his last exacerbation. Reports at the initial onset of symptoms he will utilize hot baths, stretching exercises and this seems to prevent full exacerbation. Denies any pain today, no numbness or tingling. No saddle anesthesia, changes to bowels or bladder. Groin pain: No  Saddle anesthesia: No  Hip Pain: No  Bowel/bladder incontinence: No  Constipation or Urinary retention: No    LIMITATIONS    Pain significantly limiting from a less than monthly standpoint. Assistive devices: none  Daily pharmacologic pain control include: none  Back versus leg: mostly back    MANAGEMENT     Prior Surgery: No  Prior to 1yr ago:   In the last year:    Physical Therapy: Yes   Chiropractic Interventions: No   Injections: No;  Improvement: n/a    Types of injections/responses: n/a    History:     Past Medical History:   Diagnosis Date    COVID-19 November 28th, 2020    Diverticular disease of colon     Hyperlipidemia     Hypertension     Pulmonary emboli ([address]     covid related     Past Surgical History:   Procedure Laterality Date    COLECTOMY  2/1/15        HERNIA REPAIR      HERNIA REPAIR  2021     HERNIA INCISIONAL REPAIR LAPAROSCOPIC ROBOTIC WITH MESH    JOINT REPLACEMENT  2009    L TKA    REVISION COLOSTOMY      SIGMOIDECTOMY  2/1/15    [facility]/ Dr. Latha Gleason N/A 12/3/2021    HERNIA INCISIONAL REPAIR LAPAROSCOPIC ROBOTIC WITH MESH CONVERTED TO OPEN AT 15:20 performed [facility] Starr Gamboa DO [facility] [address] [facility]     Family History   Problem Relation Age of Onset    Cancer Mother         Breast CA     Current Outpatient Medications on File Prior to Visit   Medication Sig Dispense Refill    rizatriptan (MAXALT) 10 MG tablet TKE 1 TABLET BY MOUTH ONCE IF NEEDED FOR MIGRAINES MAY REPEAT IN 2 HOURS IF NEEDED 12 tablet 5    atorvastatin (LIPITOR) 20 MG tablet TAKE 1 TABLET DAILY 90 tablet 1    lisinopril (PRINIVIL;ZESTRIL) 30 MG tablet Take 1 tablet by mouth daily 90 tablet 1    docusate sodium (COLACE) 100 MG capsule Take 1 capsule by mouth 2 times daily 20 capsule 0    NURTEC 75 MG TBDP Take every other day for prophylaxis 15 tablet 5    AIMOVIG 140 MG/ML SOAJ inject 140 milligram INTO THE SKIN EVERY 30 DAYS 1 mL 5    amitriptyline (ELAVIL) 25 MG tablet Take 3 tablets by mouth nightly 90 tablet 5     No current facility-administered medications on file prior to visit.      Social History     Tobacco Use    Smoking status: Former Smoker     Packs/day: 1.00     Years: 23.00     Pack years: 23.00     Types: Cigarettes     Quit date: 10/1/2008     Years since quittin.5    Smokeless tobacco: Never Used   Vaping Use    Vaping Use: Never used   Substance Use Topics    Alcohol use: Yes     Comment: few days a week    Drug use: Never       No Known Allergies    Review of Systems  Constitutional: Negative for activity change and appetite change. HENT: Negative for ear pain and facial swelling. Eyes: Negative for discharge and itching. Respiratory: Negative for choking and chest tightness. Cardiovascular: Negative for chest pain and leg swelling. Gastrointestinal: Negative for nausea and abdominal pain. Endocrine: Negative for cold intolerance and heat intolerance. Genitourinary: Negative for frequency and flank pain. Musculoskeletal: Negative for myalgias and joint swelling. Skin: Negative for rash and wound. Allergic/Immunologic: Negative for environmental allergies and food allergies. Hematological: Negative for adenopathy. Does not bruise/bleed easily. Psychiatric/Behavioral: Negative for self-injury. The patient is not nervous/anxious. Physical Exam:      BP (!) 147/95   Pulse 82   Temp 98 °F (36.7 °C) (Temporal)   Resp 18   Ht 6' 2\" (1.88 m)   Wt 232 lb 6.4 oz (105.4 kg)   SpO2 99%   BMI 29.84 kg/m²   Estimated body mass index is 29.84 kg/m² as calculated from the following:    Height as of this encounter: 6' 2\" (1.88 m). Weight as of this encounter: 232 lb 6.4 oz (105.4 kg). General:  Dary Stevenson is a 63y.o. year old male who appears his stated age. HEENT: Normocephalic atraumatic. Neck supple. Chest: regular rate; pulses equal  Abdomen: Soft nontender nondistended.    Ext: DP and PT pulses 2+, good cap refill  Neuro    Mentation  Appropriate affect  Registration intact  Orientation intact    Cranial Nerves:   Pupils equal and reactive to light  Extraocular motion intact  Face and shrug symmetric  Tongue midline  No dysarthria  v1-3 sensation symmetric, masseter tone symmetric  Hearing symmetric and intact    Sensation: Intact    Motor  L deltoid 5/5; R deltoid 5/5  L biceps 5/5; R biceps 5/5  L triceps 5/5; R triceps 5/5  L wrist extension 5/5; R wrist extension 5/5  L intrinsics 5/5; R intrinsics 5/5     L iliopsoas 5/5 , R iliopsoas 5/5  L quadriceps 5/5; R quadriceps 5/5  L Dorsiflexion 5/5; R dorsiflexion 5/5  L Plantarflexion 5/5; R plantarflexion 5/5  L EHL 5/5; R EHL 5/5    Reflexes  L Brachioradialis 2+/4; R brachioradialis 2+/4  L Biceps 2+/4; R Biceps 2+/4  L Triceps 2+/4; R Triceps 2+/4  L Patellar 2+/4: R Patellar 2+/4  L Achilles 2+/4; R Achilles 2+/4    hoffmans L: neg  hoffmans R: neg  Clonus L: neg  Clonus R: neg  Babinski L: neg  Babinski R: neg    CARLTON: Negative  Straight leg raise: Negative  SI joint tenderness: Negative    Studies Review:     MRI lumbar 3/21/2022 (images reviewed):    PCP notes reviewed    Assessment and Plan:      1. Other spondylosis with radiculopathy, lumbar region          Plan: Patient with history of intermittent exacerbations of low back pain with left lower extremity radiculopathy. Has been doing well for several months without any recent exacerbations. Currently pain-free today. MRI images reviewed, varying degrees of degenerative changes, foraminal stenosis, possible L5 pars defect on the left. Patient does feel that exacerbations have decreased in frequency and intensity, has been able to manage symptoms fairly well. Recommend continued monitoring at this time. Can return with any recurrence of symptoms, persistent or progressive symptoms. Followup: Return if symptoms worsen or fail to improve. Prescriptions Ordered:  No orders of the defined types were placed in this encounter. Orders Placed:  No orders of the defined types were placed in this encounter. Electronically signed by HONG Reno CNP on 5/3/2022 at 9:50 AM    Please note that this chart was generated using voice recognition Dragon dictation software. Although every effort was made to ensure the accuracy of this automated transcription, some errors in transcription may have occurred.

## 2022-05-24 ENCOUNTER — OFFICE VISIT (OUTPATIENT)
Dept: FAMILY MEDICINE CLINIC | Age: 62
End: 2022-05-24
Payer: COMMERCIAL

## 2022-05-24 VITALS
WEIGHT: 227 LBS | RESPIRATION RATE: 14 BRPM | HEIGHT: 74 IN | HEART RATE: 83 BPM | OXYGEN SATURATION: 97 % | TEMPERATURE: 97.7 F | SYSTOLIC BLOOD PRESSURE: 120 MMHG | BODY MASS INDEX: 29.13 KG/M2 | DIASTOLIC BLOOD PRESSURE: 84 MMHG

## 2022-05-24 DIAGNOSIS — M51.16 LUMBAR DISC DISEASE WITH RADICULOPATHY: Primary | ICD-10-CM

## 2022-05-24 DIAGNOSIS — M54.42 ACUTE LEFT-SIDED LOW BACK PAIN WITH LEFT-SIDED SCIATICA: ICD-10-CM

## 2022-05-24 DIAGNOSIS — E78.5 DYSLIPIDEMIA: ICD-10-CM

## 2022-05-24 DIAGNOSIS — I10 ESSENTIAL HYPERTENSION: ICD-10-CM

## 2022-05-24 PROCEDURE — 99214 OFFICE O/P EST MOD 30 MIN: CPT | Performed by: FAMILY MEDICINE

## 2022-05-24 PROCEDURE — 96372 THER/PROPH/DIAG INJ SC/IM: CPT | Performed by: FAMILY MEDICINE

## 2022-05-24 RX ORDER — PREDNISONE 20 MG/1
TABLET ORAL
Qty: 18 TABLET | Refills: 0 | Status: SHIPPED | OUTPATIENT
Start: 2022-05-24 | End: 2022-06-02

## 2022-05-24 RX ORDER — KETOROLAC TROMETHAMINE 30 MG/ML
60 INJECTION, SOLUTION INTRAMUSCULAR; INTRAVENOUS ONCE
Status: SHIPPED | OUTPATIENT
Start: 2022-05-24 | End: 2022-05-29

## 2022-05-24 RX ORDER — KETOROLAC TROMETHAMINE 30 MG/ML
60 INJECTION, SOLUTION INTRAMUSCULAR; INTRAVENOUS ONCE
Status: COMPLETED | OUTPATIENT
Start: 2022-05-24 | End: 2022-05-24

## 2022-05-24 RX ORDER — OXYCODONE HYDROCHLORIDE AND ACETAMINOPHEN 5; 325 MG/1; MG/1
1 TABLET ORAL EVERY 6 HOURS PRN
Qty: 20 TABLET | Refills: 0 | Status: SHIPPED | OUTPATIENT
Start: 2022-05-24 | End: 2022-05-29

## 2022-05-24 RX ORDER — TIZANIDINE 4 MG/1
4 TABLET ORAL 3 TIMES DAILY PRN
Qty: 30 TABLET | Refills: 0 | Status: SHIPPED | OUTPATIENT
Start: 2022-05-24

## 2022-05-24 RX ADMIN — KETOROLAC TROMETHAMINE 60 MG: 30 INJECTION, SOLUTION INTRAMUSCULAR; INTRAVENOUS at 11:36

## 2022-05-24 ASSESSMENT — PATIENT HEALTH QUESTIONNAIRE - PHQ9
SUM OF ALL RESPONSES TO PHQ QUESTIONS 1-9: 0
1. LITTLE INTEREST OR PLEASURE IN DOING THINGS: 0
SUM OF ALL RESPONSES TO PHQ9 QUESTIONS 1 & 2: 0
2. FEELING DOWN, DEPRESSED OR HOPELESS: 0
SUM OF ALL RESPONSES TO PHQ QUESTIONS 1-9: 0

## 2022-05-24 ASSESSMENT — ENCOUNTER SYMPTOMS
BOWEL INCONTINENCE: 0
ORTHOPNEA: 0
ALLERGIC/IMMUNOLOGIC NEGATIVE: 1
EYES NEGATIVE: 1
GASTROINTESTINAL NEGATIVE: 1
ABDOMINAL PAIN: 0
RESPIRATORY NEGATIVE: 1
BACK PAIN: 1
SHORTNESS OF BREATH: 0

## 2022-05-24 NOTE — PROGRESS NOTES
presenting today for   Chief Complaint   Patient presents with    Back Pain   . Back Pain  This is a chronic (most recent flare happened this past weekend) problem. The current episode started more than 1 year ago. The problem occurs constantly. The problem has been gradually worsening since onset. The pain is present in the lumbar spine. The quality of the pain is described as shooting, aching and burning. The pain radiates to the left thigh and left knee. The pain is severe. The symptoms are aggravated by bending and position. Associated symptoms include leg pain. Pertinent negatives include no abdominal pain, bladder incontinence, bowel incontinence, chest pain, dysuria, fever, headaches, numbness, paresis, paresthesias, pelvic pain, perianal numbness, tingling, weakness or weight loss. He has tried NSAIDs for the symptoms. The treatment provided no relief. Hypertension  This is a new problem. The current episode started more than 1 month ago. The problem has been gradually worsening since onset. The problem is uncontrolled. Pertinent negatives include no anxiety, chest pain, headaches, malaise/fatigue, orthopnea, palpitations, peripheral edema, PND, shortness of breath or sweats. Past treatments include ACE inhibitors. The current treatment provides mild improvement. There is no history of chronic renal disease. Hyperlipidemia  This is a chronic problem. The current episode started more than 1 year ago. The problem is controlled. Recent lipid tests were reviewed and are normal. He has no history of chronic renal disease, diabetes, hypothyroidism, liver disease or nephrotic syndrome. Associated symptoms include leg pain. Pertinent negatives include no chest pain, focal sensory loss, focal weakness, myalgias or shortness of breath. Current antihyperlipidemic treatment includes statins. The current treatment provides significant improvement of lipids.        Review of Systems   Review of Systems Constitutional: Negative. Negative for fever, malaise/fatigue and weight loss. HENT: Negative. Eyes: Negative. Respiratory: Negative. Negative for shortness of breath. Cardiovascular: Negative. Negative for chest pain, palpitations, orthopnea and PND. Gastrointestinal: Negative. Negative for abdominal pain and bowel incontinence. Endocrine: Negative. Genitourinary: Negative. Negative for bladder incontinence, dysuria and pelvic pain. Musculoskeletal: Positive for back pain. Negative for myalgias. Skin: Negative. Allergic/Immunologic: Negative. Neurological: Negative. Negative for tingling, focal weakness, weakness, numbness, headaches and paresthesias. Hematological: Negative. Psychiatric/Behavioral: Negative. All other systems reviewed and are negative. Medications     Current Outpatient Medications   Medication Sig Dispense Refill    predniSONE (DELTASONE) 20 MG tablet Take 3 tablets by mouth daily for 3 days, THEN 2 tablets daily for 3 days, THEN 1 tablet daily for 3 days. 18 tablet 0    tiZANidine (ZANAFLEX) 4 MG tablet Take 1 tablet by mouth 3 times daily as needed (back pain) 30 tablet 0    oxyCODONE-acetaminophen (PERCOCET) 5-325 MG per tablet Take 1 tablet by mouth every 6 hours as needed for Pain for up to 5 days. Intended supply: 5 days.  Take lowest dose possible to manage pain 20 tablet 0    rizatriptan (MAXALT) 10 MG tablet TKE 1 TABLET BY MOUTH ONCE IF NEEDED FOR MIGRAINES MAY REPEAT IN 2 HOURS IF NEEDED 12 tablet 5    atorvastatin (LIPITOR) 20 MG tablet TAKE 1 TABLET DAILY 90 tablet 1    lisinopril (PRINIVIL;ZESTRIL) 30 MG tablet Take 1 tablet by mouth daily 90 tablet 1    docusate sodium (COLACE) 100 MG capsule Take 1 capsule by mouth 2 times daily 20 capsule 0    NURTEC 75 MG TBDP Take every other day for prophylaxis 15 tablet 5    AIMOVIG 140 MG/ML SOAJ inject 140 milligram INTO THE SKIN EVERY 30 DAYS 1 mL 5    amitriptyline (ELAVIL) 25 MG tablet Take 3 tablets by mouth nightly 90 tablet 5     Current Facility-Administered Medications   Medication Dose Route Frequency Provider Last Rate Last Admin    ketorolac (TORADOL) injection 60 mg  60 mg IntraMUSCular Once Chloé Mauro DO           Past History    Past Medical History:   has a past medical history of COVID-19, Diverticular disease of colon, Hyperlipidemia, Hypertension, and Pulmonary emboli ([address] Social History:   reports that he quit smoking about 13 years ago. His smoking use included cigarettes. He has a 23.00 pack-year smoking history. He has never used smokeless tobacco. He reports current alcohol use. He reports that he does not use drugs. Family History:   Family History   Problem Relation Age of Onset    Cancer Mother         Breast CA       Surgical History:   Past Surgical History:   Procedure Laterality Date    COLECTOMY  2/1/15        HERNIA REPAIR  1990's    HERNIA REPAIR  12/03/2021     HERNIA INCISIONAL REPAIR LAPAROSCOPIC ROBOTIC WITH MESH    JOINT REPLACEMENT  2009    L TKA    REVISION COLOSTOMY      SIGMOIDECTOMY  2/1/15    [facility]/ Dr. Rachel Salas N/A 12/3/2021    HERNIA INCISIONAL REPAIR LAPAROSCOPIC ROBOTIC WITH MESH CONVERTED TO OPEN AT 15:20 [facility]        Physical Examination      Vitals:  /84 (Site: Left Upper Arm, Position: Sitting, Cuff Size: Medium Adult)   Pulse 83   Temp 97.7 °F (36.5 °C) (Temporal)   Resp 14   Ht 6' 2\" (1.88 m)   Wt 227 lb (103 kg)   SpO2 97%   BMI 29.15 kg/m²     Physical Exam  Vitals and nursing note reviewed. Constitutional:       General: He is not in acute distress. Appearance: Normal appearance. He is obese. He is not ill-appearing, toxic-appearing or diaphoretic. HENT:      Head: Normocephalic and atraumatic.       Right Ear: External ear normal.      Left Ear: External ear normal.   Eyes:      General: No scleral icterus. Right eye: No discharge. Left eye: No discharge. Conjunctiva/sclera: Conjunctivae normal.   Cardiovascular:      Rate and Rhythm: Normal rate and regular rhythm. Pulses: Normal pulses. Heart sounds: Normal heart sounds. No murmur heard. No friction rub. No gallop. Pulmonary:      Effort: Pulmonary effort is normal. No respiratory distress. Breath sounds: Normal breath sounds. No stridor. No wheezing, rhonchi or rales. Chest:      Chest wall: No tenderness. Musculoskeletal:      Lumbar back: Spasms and tenderness present. No swelling, edema, deformity, signs of trauma, lacerations or bony tenderness. Decreased range of motion. Positive right straight leg raise test and positive left straight leg raise test. No scoliosis. Skin:     General: Skin is warm. Coloration: Skin is not jaundiced or pale. Neurological:      Mental Status: He is alert and oriented to person, place, and time. Mental status is at baseline. Psychiatric:         Mood and Affect: Mood normal.         Behavior: Behavior normal.         Thought Content: Thought content normal.         Judgment: Judgment normal.         Labs/Imaging/Diagnostics   Labs:  No results found for: CMPWITHGFR, CBCAUTODIF, TSHFT4, LABA1C, LIPIDPAN    Imaging Last 24 Hours:  CT ABDOMEN PELVIS W IV CONTRAST  Narrative: EXAMINATION:  CT OF THE ABDOMEN AND PELVIS WITH CONTRAST 11/5/2021 9:27 am    TECHNIQUE:  CT of the abdomen and pelvis was performed with the administration of  intravenous contrast. Multiplanar reformatted images are provided for review. Dose modulation, iterative reconstruction, and/or weight based adjustment of  the mA/kV was utilized to reduce the radiation dose to as low as reasonably  achievable.     COMPARISON:  04/21/2008    HISTORY:  ORDERING SYSTEM PROVIDED HISTORY: Ventral hernia without obstruction or  gangrene  TECHNOLOGIST PROVIDED HISTORY:  Order changed to with contrast per radiologist request  Reason for Exam: abd pain with swelling  Acuity: Chronic  Type of Exam: Initial  Additional signs and symptoms: possible hernia from surgery x6 yrs ago  Relevant Medical/Surgical History: hx HTN    FINDINGS:  Lower Chest: No acute findings. Organs: 1.6 cm liver lesion in the subcapsular right hepatic lobe on image 26  is indeterminate on this exam but appears stable in size and appearance  compared to the 2008 exam.  A probable subcapsular cyst in the anterior left  hepatic lobe was not clearly demonstrated on the prior exam, which may be due  to technical differences. Small gallstones are noted. The pancreas, spleen,  adrenals and kidneys reveal no significant findings. Right renal cyst.    GI/Bowel: There is no bowel dilatation or wall thickening identified. Status  post partial sigmoid resection. A short segment of transverse colon extends  into the supraumbilical hernia. This hernia has a neck of 6.5 cm. No wall  thickening or inflammatory change in this area. Pelvis: No acute findings. Mild prostatomegaly. Peritoneum/Retroperitoneum: Infrarenal aortic enlargement measuring 2.8 x 2.6  cm. No free air. No ascites. Few small external iliac chain lymph nodes  are present bilaterally, which appears stable compared to the 2008 exam  consistent with a benign process. .    Bones/Soft Tissues: Status post left acetabular repair no acute osseous  abnormality identified. Impression: 1. Supraumbilical abdominal wall hernia containing a portion of transverse  colon. No inflammatory process or obstruction. 2.  Stable 1.6 cm subcapsular liver lesion in the right hepatic lobe as  compared to 2008 imaging, consistent with a benign process. Probable  subcentimeter cysts in the left hepatic lobe. 3.  Cholelithiasis. 4.  Infrarenal aortic enlargement measuring up to 2.8 cm, for which imaging  follow-up in 5 years is recommended.     RECOMMENDATIONS:  For management of fusiform AAA:    2.6-2.9 cm aorta, recommend follow-up every 5 years or aortas meeting the  criteria for AAA (>1.5 x proximal normal segment; no f/u if < 1.5 x proximal  normal segment; no f/u for aortas < 2.6 cm). Note: Recommend Vascular consultation if a fusiform AAA enlarges by >0.5 cm  in 6 months or >1 cm in 1 year or for a saccular AAA of any size. References: Víctor Henning Radiol 2013; 13(33):127-410; J Vasc Surg.  2018; 67:2-77

## 2022-05-24 NOTE — PATIENT INSTRUCTIONS
Patient Education        Learning About Degenerative Disc Disease  What is degenerative disc disease? Degenerative disc disease isn't really a disease. It's a term used to describe the normal changes in your spinal discs as you age. Spinal discs are small, spongy discs that separate the bones (vertebrae) that make up the spine. The discs act as shock absorbers for the spine. They let your spine flex, bend, andtwist.  Degenerative disc disease can take place in one or more places along the spine. It most often occurs in the discs in the lower back and the neck. The changes in the discs can cause back and neck pain. They can also lead toosteoarthritis, a herniated disc, or spinal stenosis. What causes it? As we age, our spinal discs break down, or degenerate. This breakdown causesthe symptoms of degenerative disc disease in some people. When the discs break down, they can lose fluid and dry out, and their outer layers can have tiny cracks or tears. This leads to less padding and less space between the bones in the spine. The body reacts to this by making bony growths on the spine called bone spurs. These spurs can press on the spinal nerve rootsor spinal cord. This can cause pain and can affect how well the nerves work. These changes in the discs are more likely to occur if you smoke, do heavy physical work (such as repeated heavy lifting), or are very overweight. Asudden injury may also cause changes to occur. What are the symptoms? Many people with degenerative disc disease have no pain. But others have severe pain or other symptoms that limit their activities. Some of the most commonsymptoms are:   Pain in the back or neck. Where the pain occurs depends on which discs are affected.  Pain that gets worse when you move, such as when you bend over, reach up, or twist.   Pain that may occur in the rear end (buttocks), arm, or leg if a nerve is pinched.  Numbness or tingling in your arm or leg.   The pain may start after a major injury (such as from a car accident), a minor injury (such as a fall from a low height), or a normal motion (such as bending over to pick something up). It may also start gradually for no known reason andget worse over time. How is it diagnosed? A doctor can often diagnose degenerative disc disease while doing a physical exam. If your exam shows no signs of a serious condition, imaging tests (suchas an X-ray) aren't likely to help your doctor find the cause of your symptoms. Sometimes degenerative disc disease is found when an X-ray is taken for another reason, such as an injury or other health problem. But even if the doctor findsdegenerative disc disease, that doesn't always mean that you will have symptoms. How is degenerative disc disease treated? Self-care may be all you need to relieve pain caused by disc changes. This may include using ice or heat and taking over-the-counter medicines. Your doctorcan prescribe stronger medicines if needed. If you develop health problems such as osteoarthritis, a herniated disc, or spinal stenosis, you may need other treatments. These include physical therapy and exercises for strengthening and stretching the back. In some cases, surgery may be recommended. It usually involves removing the damaged disc. In some cases, the bone is then permanently joined (fused) to protect the spinal cord. In rare cases, an artificial disc may be used to replace the disc that isremoved. How can you care for yourself? Here are some things you can do to help manage pain from degenerative discdisease.  Use ice or heat (whichever feels better) on the affected area. ? Put ice or a cold pack on the area for 10 to 20 minutes at a time. Put a thin cloth between the ice and your skin. ? Put a warm water bottle, a heating pad set on low, or a warm cloth on your back. Put a thin cloth between the heating pad and your skin.  Do not go to sleep with a heating pad on your skin.   Ask your doctor if you can take an over-the-counter pain medicine. These include acetaminophen (such as Tylenol) and nonsteroidal anti-inflammatory drugs, such as ibuprofen or naproxen. Your doctor can prescribe stronger medicines if needed. Be safe with medicines. Read and followall instructions on the label.  Get some exercise every day. Exercise is one of the best ways to help your back feel better and stay better. It's best to start each exercise slowly. You may notice a little soreness, and that's okay. But if an exercise makes your pain worse, stop doing it. Here arethings you can try:  ? Walking. It's the simplest and maybe the best activity for your back. It gets your blood moving and helps your muscles stay strong. ? Exercises that gently stretch and strengthen your stomach, back, and leg muscles. The stronger those muscles are, the better they're able to protect your back. Follow-up care is a key part of your treatment and safety. Be sure to make and go to all appointments, and call your doctor if you are having problems. It's also a good idea to know your test results and keep alist of the medicines you take. Where can you learn more? Go to [email or web address] and sign in to your [employer] account. Enter G122 in the AorTx box to learn more about \"Learning About Degenerative Disc Disease. \"     If you do not have an account, please click on the \"Sign Up Now\" link. Current as of: July 1, 2021               Content Version: 13.2  © 2006-2022 [employer]wise, Incorporated. Care instructions adapted under license by [facility] ([facility]). If you have questions about a medical condition or this instruction, always ask your healthcare professional. [place] [address] any warranty or liability for your use of this information.

## 2022-05-31 DIAGNOSIS — I10 ESSENTIAL HYPERTENSION: ICD-10-CM

## 2022-05-31 RX ORDER — LISINOPRIL 30 MG/1
30 TABLET ORAL DAILY
Qty: 90 TABLET | Refills: 1 | Status: SHIPPED | OUTPATIENT
Start: 2022-05-31

## 2022-05-31 NOTE — TELEPHONE ENCOUNTER
Parker Hernandez is calling to request a refill on the following medication(s):    Medication Request:  Requested Prescriptions     Pending Prescriptions Disp Refills    lisinopril (PRINIVIL;ZESTRIL) 30 MG tablet 90 tablet 1     Sig: Take 1 tablet by mouth daily       Last Visit Date (If Applicable):  1/29/9243    Next Visit Date:    Visit date not found

## 2022-07-20 DIAGNOSIS — M19.172 POST-TRAUMATIC ARTHRITIS OF LEFT ANKLE: Primary | ICD-10-CM

## 2022-07-21 ENCOUNTER — OFFICE VISIT (OUTPATIENT)
Dept: ORTHOPEDIC SURGERY | Age: 62
End: 2022-07-21

## 2022-07-21 VITALS — RESPIRATION RATE: 16 BRPM | WEIGHT: 227 LBS | HEIGHT: 74 IN | BODY MASS INDEX: 29.13 KG/M2 | OXYGEN SATURATION: 100 %

## 2022-07-21 DIAGNOSIS — M25.572 CHRONIC PAIN OF LEFT ANKLE: ICD-10-CM

## 2022-07-21 DIAGNOSIS — M24.573 EQUINUS CONTRACTURE OF ANKLE: ICD-10-CM

## 2022-07-21 DIAGNOSIS — G89.29 CHRONIC PAIN OF LEFT ANKLE: ICD-10-CM

## 2022-07-21 DIAGNOSIS — M19.172 POST-TRAUMATIC ARTHRITIS OF LEFT ANKLE: Primary | ICD-10-CM

## 2022-07-21 DIAGNOSIS — Z96.9 RETAINED ORTHOPEDIC HARDWARE: ICD-10-CM

## 2022-07-21 PROCEDURE — 99214 OFFICE O/P EST MOD 30 MIN: CPT | Performed by: ORTHOPAEDIC SURGERY

## 2022-07-21 PROCEDURE — 20605 DRAIN/INJ JOINT/BURSA W/O US: CPT | Performed by: ORTHOPAEDIC SURGERY

## 2022-07-21 RX ADMIN — TRIAMCINOLONE ACETONIDE 40 MG: 40 INJECTION, SUSPENSION INTRA-ARTICULAR; INTRAMUSCULAR at 15:00

## 2022-07-21 RX ADMIN — LIDOCAINE HYDROCHLORIDE 2 ML: 10 INJECTION, SOLUTION INFILTRATION; PERINEURAL at 08:42
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
## 2022-07-22 RX ORDER — TRIAMCINOLONE ACETONIDE 40 MG/ML
40 INJECTION, SUSPENSION INTRA-ARTICULAR; INTRAMUSCULAR ONCE
Status: COMPLETED | OUTPATIENT
Start: 2022-07-22 | End: 2022-07-21

## 2022-07-22 RX ORDER — LIDOCAINE HYDROCHLORIDE 10 MG/ML
2 INJECTION, SOLUTION INFILTRATION; PERINEURAL ONCE
Status: COMPLETED | OUTPATIENT
Start: 2022-07-22 | End: 2022-07-21

## 2022-08-01 DIAGNOSIS — G43.719 INTRACTABLE CHRONIC MIGRAINE WITHOUT AURA AND WITHOUT STATUS MIGRAINOSUS: ICD-10-CM

## 2022-08-01 RX ORDER — AMITRIPTYLINE HYDROCHLORIDE 25 MG/1
75 TABLET, FILM COATED ORAL NIGHTLY
Qty: 90 TABLET | Refills: 1 | Status: SHIPPED | OUTPATIENT
Start: 2022-08-01 | End: 2022-10-06 | Stop reason: SDUPTHER

## 2022-09-19 NOTE — TELEPHONE ENCOUNTER
Pharmacy requesting refill of rizatriptan (MAXALT) 10 MG tablet      Medication active on med list yes      Date of last Rx: 4/18/2022 with 5 refills          verified by SANA BORDEN      Date of last appointment 11/4/2021    Next Visit Date:  Visit date not found    Please fill for [facility]. Thank you.

## 2022-09-21 RX ORDER — RIZATRIPTAN BENZOATE 10 MG/1
TABLET ORAL
Qty: 12 TABLET | Refills: 5 | Status: SHIPPED | OUTPATIENT
Start: 2022-09-21

## 2022-10-01 DIAGNOSIS — E78.5 DYSLIPIDEMIA: ICD-10-CM

## 2022-10-03 RX ORDER — ATORVASTATIN CALCIUM 20 MG/1
TABLET, FILM COATED ORAL
Qty: 90 TABLET | Refills: 1 | Status: SHIPPED | OUTPATIENT
Start: 2022-10-03

## 2022-10-03 NOTE — TELEPHONE ENCOUNTER
Katie Yoder is calling to request a refill on the following medication(s):    Medication Request:  Requested Prescriptions     Pending Prescriptions Disp Refills    atorvastatin (LIPITOR) 20 MG tablet [Pharmacy Med Name: ATORVASTATIN TAB 20MG] 90 tablet 1     Sig: TAKE 1 TABLET DAILY       Last Visit Date (If Applicable):  1/09/0137    Next Visit Date:    Visit date not found

## 2022-10-05 DIAGNOSIS — G43.719 INTRACTABLE CHRONIC MIGRAINE WITHOUT AURA AND WITHOUT STATUS MIGRAINOSUS: Primary | ICD-10-CM

## 2022-10-05 RX ORDER — BUTALBITAL, ACETAMINOPHEN AND CAFFEINE 50; 325; 40 MG/1; MG/1; MG/1
TABLET ORAL
Qty: 60 TABLET | Refills: 3 | OUTPATIENT
Start: 2022-10-05

## 2022-10-05 NOTE — TELEPHONE ENCOUNTER
I called [address] [place] [address] to verify if  Botox needs a prior Lashae Ruano. Per Suhail Revering no preauthorizatoin is required. Ref. #[id number].

## 2022-10-05 NOTE — TELEPHONE ENCOUNTER
Sandra Terryyamini called in stating he is having daily headaches that turn into a migraine for a couple of weeks now. He has tried rizatriptan and fioricet with no relief. He takes Nurtec QOD with no relief. He is asking if he can try Ajovy and Botox. Back in April you advised the Ajovy but the order wasn't placed most likely because he was getting his Aimovig. Order added. The Aimovig isn't covered by his insurance.  Please advise

## 2022-10-06 ENCOUNTER — PROCEDURE VISIT (OUTPATIENT)
Dept: NEUROLOGY | Age: 62
End: 2022-10-06
Payer: COMMERCIAL

## 2022-10-06 DIAGNOSIS — G43.719 INTRACTABLE CHRONIC MIGRAINE WITHOUT AURA AND WITHOUT STATUS MIGRAINOSUS: Primary | ICD-10-CM

## 2022-10-06 PROCEDURE — 99214 OFFICE O/P EST MOD 30 MIN: CPT | Performed by: NURSE PRACTITIONER

## 2022-10-06 PROCEDURE — 64615 CHEMODENERV MUSC MIGRAINE: CPT | Performed by: NURSE PRACTITIONER

## 2022-10-06 RX ORDER — AMITRIPTYLINE HYDROCHLORIDE 25 MG/1
75 TABLET, FILM COATED ORAL NIGHTLY
Qty: 90 TABLET | Refills: 11 | Status: SHIPPED | OUTPATIENT
Start: 2022-10-06 | End: 2022-11-05

## 2022-10-06 RX ORDER — PREDNISONE 20 MG/1
TABLET ORAL
Qty: 18 TABLET | Refills: 0 | Status: SHIPPED | OUTPATIENT
Start: 2022-10-06

## 2022-10-06 NOTE — PROGRESS NOTES
[facility]  Offices: [address], [place], [address] [facility]  [address] [facility], [address], [place], [address] [facility]  [address] [place] [address] [address]  Phone: [phone]  Fax: [phone]     MD Antelmo Linder MD Gerrit Prow, MD Markus Albany, CNP        Procedure: Chemodenervation CPT Code 19998  Indication for treatment: Chronic migraine (ICD 10 G43.709)  Consent form was signed. Potential risks and benefits for the procedure were explained to the patient. Procedure: 30-gauge half inch needle was used and 200 U vial Botox was prepared with 4ml 0.9% NS.155 units of Botox were used and 45 units of Botox were discarded. Botox was injected at 31 different sites as follows:   Order  Muscle  Units injected    A  Corrugator1  10 units at 2 div sites    B  Procerus  5 Units in 1 site    C  Frontalis¹  20 Units div. 4 sites    D  Temporalis¹  40 Units div 8 site    E  Occipitalis¹  30 Units div. 6 sites    F  Cervical paraspinal muscles¹  20 Units div 4 sites    G  Trapezius¹  30 Units div 6 sites    Total Dose  155 Units div 31 sites    2 Dose distributed bilaterally     Blood loss: Less than 1 cc     Complications: none     Disposition: Post-botox instructions were reviewed and provided to the patient. Patient was advised to seek medical care for any generalized muscle weakness, double vision, ptosis, trouble swallowing, difficulty talking or difficulty breathing. The patient will return in 3 months for subsequent botox treatments.

## 2022-10-06 NOTE — PROGRESS NOTES
[facility]            [address]          [place], [address] [facility]          Dept: [phone]          Dept Fax: [phone]    MD Antelmo Lima MD Percilla Apa, MD Little Leer, CNP            10/6/2022      HISTORY OF PRESENT ILLNESS:       I had the pleasure of seeing Bull Carter, who returns for continuing neurologic care. The patient was seen last on November 4, 2021 for treatment of chronic intractable migraine headaches. For prophylaxis of his migraine headaches he was prescribed amitriptyline 75 mg at bedtime daily, nurtec every other day and botox injections receiving his last injection in November 2021. He reports today that until 3 weeks ago he was well controlled having 1-2 headaches/month since receiving botox in November 2021. He is agreeable to restart botox injections prior to today's visit as he has been having daily headaches for the past three weeks. Headache location: neck and back of the head  Headache quality: pressure  Associated factors:  nausea, cold sweat  Intensity: 10/10  Headache chronicity: 2 years  Headache frequency: daily  Aggravating factors : laying down, neck pain, bright lights  Relieving factors: Imitrex  Prophylactic medications:amitriptyline, botox  Abortive medications: nurtec, Maxalt  Previously used medications: Topamax, Fioricet, nurtec        Testing reviewed:    MRI Brain WO contrast (12/24/2020): Impression   No acute intracranial abnormality. Scattered foci of hyperintense signal mildly within the periventricular and   subcortical white matter of both cerebral hemispheres.   These changes likely   relate to either changes related to migraines versus chronic microvascular   disease         PAST MEDICAL HISTORY:         Diagnosis Date    COVID-19     November 28th, 2020    Diverticular disease of colon     Hyperlipidemia     Hypertension     Pulmonary emboli ([address] covid related        PAST SURGICAL HISTORY:         Procedure Laterality Date    COLECTOMY  2/1/15        HERNIA REPAIR      HERNIA REPAIR  2021     HERNIA INCISIONAL REPAIR LAPAROSCOPIC ROBOTIC WITH MESH    JOINT REPLACEMENT  2009    L TKA    REVISION COLOSTOMY      SIGMOIDECTOMY  2/1/15    [facility]/ Dr. Butler Guess N/A 12/3/2021    HERNIA INCISIONAL REPAIR LAPAROSCOPIC ROBOTIC WITH MESH CONVERTED TO OPEN AT 15:20 performed by Flores Mcconnell DO at [address]:     Social History     Socioeconomic History    Marital status:      Spouse name: Not on file    Number of children: Not on file    Years of education: Not on file    Highest education level: Not on file   Occupational History    Not on file   Tobacco Use    Smoking status: Former     Packs/day: 1.00     Years: 23.00     Pack years: 23.00     Types: Cigarettes     Quit date: 10/1/2008     Years since quittin.0    Smokeless tobacco: Never   Vaping Use    Vaping Use: Never used   Substance and Sexual Activity    Alcohol use: Yes     Comment: few days a week    Drug use: Never    Sexual activity: Not on file   Other Topics Concern    Not on file   Social History Narrative    ** Merged History Encounter **          Social Determinants of Health     Financial Resource Strain: Not on file   Food Insecurity: Not on file   Transportation Needs: Not on file   Physical Activity: Not on file   Stress: Not on file   Social Connections: Not on file   Intimate Partner Violence: Not on file   Housing Stability: Not on file       CURRENT MEDICATIONS:     Current Outpatient Medications   Medication Sig Dispense Refill    atorvastatin (LIPITOR) 20 MG tablet TAKE 1 TABLET DAILY 90 tablet 1    rizatriptan (MAXALT) 10 MG tablet TKE 1 TABLET BY MOUTH ONCE IF NEEDED FOR MIGRAINES MAY REPEAT IN 2 HOURS IF NEEDED 12 tablet 5    amitriptyline (ELAVIL) 25 MG tablet take 3 tablets by mouth nightly 90 tablet 1    diclofenac sodium (VOLTAREN) 1 % GEL Apply 4 g topically 4 times daily as needed for Pain 200 g 0    lisinopril (PRINIVIL;ZESTRIL) 30 MG tablet Take 1 tablet by mouth daily 90 tablet 1    tiZANidine (ZANAFLEX) 4 MG tablet Take 1 tablet by mouth 3 times daily as needed (back pain) 30 tablet 0    docusate sodium (COLACE) 100 MG capsule Take 1 capsule by mouth 2 times daily 20 capsule 0    NURTEC 75 MG TBDP Take every other day for prophylaxis 15 tablet 5    AIMOVIG 140 MG/ML SOAJ inject 140 milligram INTO THE SKIN EVERY 30 DAYS 1 mL 5     No current facility-administered medications for this visit. ALLERGIES:   No Known Allergies                              REVIEW OF SYSTEMS        All items selected indicate a positive finding. Those items not selected are negative. Constitutional [] Weight loss/gain   [] Fatigue  [] Fever/Chills   HEENT [] Hearing Loss  [] Visual Disturbance  [] Tinnitus  [] Eye pain   Respiratory [] Shortness of Breath  [] Cough  [] Snoring   Cardiovascular [] Chest Pain  [] Palpitations  [] Lightheaded   GI [] Constipation  [] Diarrhea  [] Swallowing change  [x] Nausea/vomiting    [] Urinary Frequency  [] Urinary Urgency   Musculoskeletal [] Neck pain  [] Back pain  [] Muscle pain  [] Restless legs   Dermatologic [] Skin changes   Neurologic [] Memory loss/confusion  [] Seizures  [] Trouble walking or imbalance  [] Dizziness  [] Sleep disturbance  [] Weakness  [] Numbness  [] Tremors  [] Speech Difficulty  [x] Headaches  [x] Light Sensitivity  [x] Sound Sensitivity   Endocrinology []Excessive thirst  []Excessive hunger   Psychiatric [] Anxiety/Depression  [] Hallucination   Allergy/immunology []Hives/environmental allergies   Hematologic/lymph [] Abnormal bleeding  [] Abnormal bruising         PHYSICAL EXAMINATION:       There were no vitals filed for this visit. Zenobia Beasley General Appearance:  Alert, cooperative, no signs of distress, appears stated age   Head:  Normocephalic, no signs of trauma   Eyes:  Conjunctiva/corneas clear;  eyelids intact   Ears:  Normal external ear and canals   Nose: Nares normal, mucosa normal, no drainage    Throat: Lips and tongue normal; teeth normal;  gums normal   Neck: Supple, intact flexion, extension and rotation;   trachea midline;  no adenopathy;   thyroid: not enlarged;   no carotid pulse abnormality   Back:   Symmetric, no curvature, ROM adequate   Lungs:   Respirations unlabored   Heart:  Regular rate and rhythm           Extremities: Extremities normal, no cyanosis, no edema   Pulses: Symmetric over head and neck   Skin: Skin color, texture normal, no rashes, no lesions                                     NEUROLOGIC EXAMINATION    Neurologic Exam  Mental status    Alert and oriented x 3; intact memory with no confusion, speech or language problems; no hallucinations or delusions  Fund of information appropriate for level of education    Cranial nerves    II - visual fields intact to confrontation bilaterally  III, IV, VI - extra-ocular muscles full: no pupillary defect; no MAGGY, no nystagmus, no ptosis   V - normal facial sensation                                                               VII - normal facial symmetry                                                             VIII - intact hearing                                                                             IX, X - symmetrical palate                                                                  XI - symmetrical shoulder shrug                                                       XII - tongue midline without atrophy or fasciculation      Motor function  Normal muscle bulk and tone; strength 5/5 on all 4 extremities, no pronator drift      Sensory function Intact to light touch, pinprick, vibration, proprioception on all 4 extremities Cerebellar Intact fine motor movement. No involuntary movements or tremors. No ataxia or dysmetria on finger to nose or heel to shin testing      Reflex function DTR 2+ on bilateral UE and LE, symmetric. Down going toes bilaterally      Gait                   normal base and arm swing                  Medical Decision Making: In summary, your patient, Valerie Escobar exhibits the following, with associated plan:    Chronic intractable migraine headaches, which were well controlled for approximately 9 months after receiving botox injections in November 2022 having 1-2/month however he has been having daily migraines for the past three weeks. Medication trials include topamax, amitriptyline, botox, nurtec and aimovig   Continue Maxalt 10 mg for abortive therapy  Continue Amitriptyline 75 mg at bed time daily  Continue botox injections   Continue  nurtec  Continue Fioricet for rescue therapy  Return for botox injections in 3 months             Signed: Daisy Anderson CNP      *Please note that portions of this note were completed with a voice recognition program.  Although every effort was made to insure the accuracy of this automated transcription, some errors in transcription may have occurred, occasionally words and are mis-transcribed    Provider Attestation: The documentation recorded by the scribe accurately reflects the service I personally performed and the decisions made by myself. Portions of this note were transcribed by a scribe. I personally performed the history, physical exam, and medical decision-making and confirm the accuracy of the information in the transcribed note. Scribe Attestation:   By signing my name below, Hiram Galvez, attest that this documentation has been prepared under the direction and in the presence of Daisy Anderson CNP.

## 2022-10-17 ENCOUNTER — OFFICE VISIT (OUTPATIENT)
Dept: ORTHOPEDIC SURGERY | Age: 62
End: 2022-10-17
Payer: COMMERCIAL

## 2022-10-17 VITALS — HEIGHT: 74 IN | RESPIRATION RATE: 12 BRPM | BODY MASS INDEX: 29.13 KG/M2 | WEIGHT: 227 LBS

## 2022-10-17 DIAGNOSIS — M24.573 EQUINUS CONTRACTURE OF ANKLE: ICD-10-CM

## 2022-10-17 DIAGNOSIS — M19.172 POST-TRAUMATIC ARTHRITIS OF LEFT ANKLE: Primary | ICD-10-CM

## 2022-10-17 PROCEDURE — 20605 DRAIN/INJ JOINT/BURSA W/O US: CPT | Performed by: ORTHOPAEDIC SURGERY

## 2022-10-17 PROCEDURE — 99212 OFFICE O/P EST SF 10 MIN: CPT | Performed by: ORTHOPAEDIC SURGERY

## 2022-10-17 RX ORDER — LIDOCAINE HYDROCHLORIDE 10 MG/ML
2 INJECTION, SOLUTION INFILTRATION; PERINEURAL ONCE
Status: COMPLETED | OUTPATIENT
Start: 2022-10-17 | End: 2022-10-17

## 2022-10-17 RX ORDER — TRIAMCINOLONE ACETONIDE 40 MG/ML
40 INJECTION, SUSPENSION INTRA-ARTICULAR; INTRAMUSCULAR ONCE
Status: COMPLETED | OUTPATIENT
Start: 2022-10-17 | End: 2022-10-17

## 2022-10-17 RX ADMIN — TRIAMCINOLONE ACETONIDE 40 MG: 40 INJECTION, SUSPENSION INTRA-ARTICULAR; INTRAMUSCULAR at 15:26

## 2022-10-17 RX ADMIN — LIDOCAINE HYDROCHLORIDE 2 ML: 10 INJECTION, SOLUTION INFILTRATION; PERINEURAL at 15:25
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
## 2022-10-28 ENCOUNTER — OFFICE VISIT (OUTPATIENT)
Dept: FAMILY MEDICINE CLINIC | Age: 62
End: 2022-10-28
Payer: COMMERCIAL

## 2022-10-28 VITALS
TEMPERATURE: 97.2 F | DIASTOLIC BLOOD PRESSURE: 80 MMHG | HEART RATE: 108 BPM | OXYGEN SATURATION: 9 % | SYSTOLIC BLOOD PRESSURE: 120 MMHG | WEIGHT: 227 LBS | BODY MASS INDEX: 29.15 KG/M2

## 2022-10-28 DIAGNOSIS — M51.16 LUMBAR DISC DISEASE WITH RADICULOPATHY: Primary | ICD-10-CM

## 2022-10-28 DIAGNOSIS — L03.012 PARONYCHIA OF LEFT THUMB: ICD-10-CM

## 2022-10-28 PROCEDURE — 3078F DIAST BP <80 MM HG: CPT

## 2022-10-28 PROCEDURE — 96372 THER/PROPH/DIAG INJ SC/IM: CPT

## 2022-10-28 PROCEDURE — 3074F SYST BP LT 130 MM HG: CPT

## 2022-10-28 PROCEDURE — 99213 OFFICE O/P EST LOW 20 MIN: CPT

## 2022-10-28 RX ORDER — METHYLPREDNISOLONE SODIUM SUCCINATE 125 MG/2ML
125 INJECTION, POWDER, LYOPHILIZED, FOR SOLUTION INTRAMUSCULAR; INTRAVENOUS ONCE
Status: COMPLETED | OUTPATIENT
Start: 2022-10-28 | End: 2022-10-28

## 2022-10-28 RX ORDER — METHYLPREDNISOLONE SODIUM SUCCINATE 125 MG/2ML
125 INJECTION, POWDER, LYOPHILIZED, FOR SOLUTION INTRAMUSCULAR; INTRAVENOUS ONCE
Status: DISCONTINUED | OUTPATIENT
Start: 2022-10-28 | End: 2022-10-28

## 2022-10-28 RX ORDER — OXYCODONE HYDROCHLORIDE AND ACETAMINOPHEN 5; 325 MG/1; MG/1
1 TABLET ORAL EVERY 6 HOURS PRN
Qty: 20 TABLET | Refills: 0 | Status: SHIPPED | OUTPATIENT
Start: 2022-10-28 | End: 2022-11-02

## 2022-10-28 RX ORDER — PREDNISONE 20 MG/1
20 TABLET ORAL 2 TIMES DAILY
Qty: 10 TABLET | Refills: 0 | Status: SHIPPED | OUTPATIENT
Start: 2022-10-28 | End: 2022-11-02

## 2022-10-28 RX ORDER — CEPHALEXIN 500 MG/1
500 CAPSULE ORAL 2 TIMES DAILY
Qty: 14 CAPSULE | Refills: 0 | Status: SHIPPED | OUTPATIENT
Start: 2022-10-28 | End: 2022-11-04

## 2022-10-28 RX ORDER — KETOROLAC TROMETHAMINE 30 MG/ML
60 INJECTION, SOLUTION INTRAMUSCULAR; INTRAVENOUS ONCE
Status: COMPLETED | OUTPATIENT
Start: 2022-10-28 | End: 2022-10-28

## 2022-10-28 RX ADMIN — METHYLPREDNISOLONE SODIUM SUCCINATE 125 MG: 125 INJECTION, POWDER, LYOPHILIZED, FOR SOLUTION INTRAMUSCULAR; INTRAVENOUS at 13:11

## 2022-10-28 RX ADMIN — KETOROLAC TROMETHAMINE 60 MG: 30 INJECTION, SOLUTION INTRAMUSCULAR; INTRAVENOUS at 12:22

## 2022-10-28 SDOH — ECONOMIC STABILITY: FOOD INSECURITY: WITHIN THE PAST 12 MONTHS, THE FOOD YOU BOUGHT JUST DIDN'T LAST AND YOU DIDN'T HAVE MONEY TO GET MORE.: NEVER TRUE

## 2022-10-28 SDOH — ECONOMIC STABILITY: TRANSPORTATION INSECURITY
IN THE PAST 12 MONTHS, HAS THE LACK OF TRANSPORTATION KEPT YOU FROM MEDICAL APPOINTMENTS OR FROM GETTING MEDICATIONS?: NO

## 2022-10-28 SDOH — ECONOMIC STABILITY: TRANSPORTATION INSECURITY
IN THE PAST 12 MONTHS, HAS LACK OF TRANSPORTATION KEPT YOU FROM MEETINGS, WORK, OR FROM GETTING THINGS NEEDED FOR DAILY LIVING?: NO

## 2022-10-28 SDOH — ECONOMIC STABILITY: FOOD INSECURITY: WITHIN THE PAST 12 MONTHS, YOU WORRIED THAT YOUR FOOD WOULD RUN OUT BEFORE YOU GOT MONEY TO BUY MORE.: NEVER TRUE

## 2022-10-28 ASSESSMENT — ENCOUNTER SYMPTOMS
COUGH: 0
COLOR CHANGE: 1
ABDOMINAL PAIN: 0
SORE THROAT: 0
EYE PAIN: 0
WHEEZING: 0
DIARRHEA: 0
EYE DISCHARGE: 0
NAUSEA: 0
CONSTIPATION: 0
SHORTNESS OF BREATH: 0
CHEST TIGHTNESS: 0
BACK PAIN: 1
VOMITING: 0

## 2022-10-28 ASSESSMENT — PATIENT HEALTH QUESTIONNAIRE - PHQ9
SUM OF ALL RESPONSES TO PHQ QUESTIONS 1-9: 0
1. LITTLE INTEREST OR PLEASURE IN DOING THINGS: 0
SUM OF ALL RESPONSES TO PHQ9 QUESTIONS 1 & 2: 0
SUM OF ALL RESPONSES TO PHQ QUESTIONS 1-9: 0
SUM OF ALL RESPONSES TO PHQ QUESTIONS 1-9: 0
2. FEELING DOWN, DEPRESSED OR HOPELESS: 0
SUM OF ALL RESPONSES TO PHQ QUESTIONS 1-9: 0

## 2022-10-28 ASSESSMENT — SOCIAL DETERMINANTS OF HEALTH (SDOH): HOW HARD IS IT FOR YOU TO PAY FOR THE VERY BASICS LIKE FOOD, HOUSING, MEDICAL CARE, AND HEATING?: NOT HARD AT ALL

## 2022-10-28 NOTE — PROGRESS NOTES
pain.   Skin:  Positive for color change (red/bruising to left thumb). Negative for rash and wound. Neurological:  Negative for dizziness, seizures, syncope, weakness, light-headedness, numbness and headaches. Psychiatric/Behavioral:  Negative for confusion. Objective   Physical Exam  Vitals and nursing note reviewed. Constitutional:       General: He is not in acute distress. Appearance: Normal appearance. He is well-developed. He is not ill-appearing, toxic-appearing or diaphoretic. HENT:      Head: Normocephalic and atraumatic. Right Ear: External ear normal.      Left Ear: External ear normal.      Nose: Nose normal.   Eyes:      General: No scleral icterus. Right eye: No discharge. Left eye: No discharge. Conjunctiva/sclera: Conjunctivae normal.      Pupils: Pupils are equal, round, and reactive to light. Neck:      Trachea: No tracheal deviation. Cardiovascular:      Rate and Rhythm: Normal rate and regular rhythm. Heart sounds: Normal heart sounds. No murmur heard. No friction rub. No gallop. Pulmonary:      Effort: Pulmonary effort is normal. No tachypnea, accessory muscle usage or respiratory distress. Breath sounds: Normal breath sounds. No stridor. No decreased breath sounds, wheezing, rhonchi or rales. Abdominal:      Palpations: Abdomen is soft. Musculoskeletal:         General: No deformity. Right hand: Normal.      Left hand: Swelling and tenderness present. No bony tenderness. Normal range of motion. Normal pulse. Arms:       Cervical back: Normal range of motion and neck supple. Lumbar back: Spasms and tenderness (SI joint) present. No swelling or edema. Decreased range of motion. Positive left straight leg raise test.      Comments: Bruising, erythema, warmth to left thumb   Skin:     General: Skin is warm and dry. Coloration: Skin is not pale. Findings: No erythema or rash.    Neurological:      Mental Status: He is alert and oriented to person, place, and time. GCS: GCS eye subscore is 4. GCS verbal subscore is 5. GCS motor subscore is 6. Gait: Gait normal.   Psychiatric:         Speech: Speech normal.         Behavior: Behavior normal.         Thought Content: Thought content normal.         Judgment: Judgment normal.               ASSESSMENT/PLAN:  1. Lumbar disc disease with radiculopathy  -acute on chronic left sided sciatica, pt very uncomfortable in office, difficulty ambulating  -PT referral and IM injections for pain relief here    -     External Referral To Physical Therapy  -     ketorolac (TORADOL) injection 60 mg; 60 mg, IntraMUSCular, ONCE, 1 dose, On Fri 10/28/22 at 1215Do not administer for more than 5 days. -     predniSONE (DELTASONE) 20 MG tablet; Take 1 tablet by mouth 2 times daily for 5 days, Disp-10 tablet, R-0Normal  -     oxyCODONE-acetaminophen (PERCOCET) 5-325 MG per tablet; Take 1 tablet by mouth every 6 hours as needed for Pain for up to 5 days. Intended supply: 5 days. Take lowest dose possible to manage pain, Disp-20 tablet, R-0Normal  -     methylPREDNISolone sodium (SOLU-MEDROL) injection 125 mg; 125 mg, IntraMUSCular, ONCE, 1 dose, On Fri 10/28/22 at 1330    2. Paronychia of left thumb  -encouraged to continue topical neosporin   -keflex for coverage    -     cephALEXin (KEFLEX) 500 MG capsule; Take 1 capsule by mouth 2 times daily for 7 days, Disp-14 capsule, R-0Normal    Return if symptoms worsen or fail to improve. An electronic signature was used to authenticate this note.     --Bo Guzman, HONG - CNP

## 2022-12-06 NOTE — TELEPHONE ENCOUNTER
Pharmacy requesting refill of  Nurtec 75 mg.       Medication active on med list yes      Date of last Rx: 11/4/2021 with 5 refills          verified by SANA CHILDS      Date of last appointment 10/6/2022    Next Visit Date:  1/4/2023

## 2022-12-07 RX ORDER — RIMEGEPANT SULFATE 75 MG/75MG
TABLET, ORALLY DISINTEGRATING ORAL
Qty: 16 TABLET | Refills: 1 | Status: SHIPPED | OUTPATIENT
Start: 2022-12-07

## 2023-01-04 ENCOUNTER — PROCEDURE VISIT (OUTPATIENT)
Dept: NEUROLOGY | Age: 63
End: 2023-01-04

## 2023-01-04 DIAGNOSIS — G43.719 INTRACTABLE CHRONIC MIGRAINE WITHOUT AURA AND WITHOUT STATUS MIGRAINOSUS: Primary | ICD-10-CM

## 2023-01-04 RX ORDER — RIMEGEPANT SULFATE 75 MG/75MG
TABLET, ORALLY DISINTEGRATING ORAL
Qty: 16 TABLET | Refills: 11 | Status: SHIPPED | OUTPATIENT
Start: 2023-01-04

## 2023-01-04 RX ORDER — RIZATRIPTAN BENZOATE 10 MG/1
TABLET ORAL
Qty: 12 TABLET | Refills: 11 | Status: SHIPPED | OUTPATIENT
Start: 2023-01-04

## 2023-01-04 NOTE — PROGRESS NOTES
[facility]  Offices: [address], [place], [address]  [address] [facility], [address], [place], [address] [place] [address]  [address] [place] [address] Caterina Frost, \[facility] [place] [facility]\"" [address]  Phone: [phone]  Fax: [phone]     MD Antelmo Niño MD Esequiel Baize, MD Chipper Campanile, CNP        Procedure: Chemodenervation CPT Code 64519  Indication for treatment: Chronic migraine (ICD 10 G43.709)  Consent form was signed. Potential risks and benefits for the procedure were explained to the patient. Procedure: 30-gauge half inch needle was used and 200 U vial Botox was prepared with 4ml 0.9% NS.155 units of Botox were used and 45 units of Botox were discarded. Botox was injected at 31 different sites as follows:   Order  Muscle  Units injected    A  Corrugator1  10 units at 2 div sites    B  Procerus  5 Units in 1 site    C  Frontalis¹  20 Units div. 4 sites    D  Temporalis¹  40 Units div 8 site    E  Occipitalis¹  30 Units div. 6 sites    F  Cervical paraspinal muscles¹  20 Units div 4 sites    G  Trapezius¹  30 Units div 6 sites    Total Dose  155 Units div 31 sites    2 Dose distributed bilaterally     Blood loss: Less than 1 cc     Complications: none     Disposition: Post-botox instructions were reviewed and provided to the patient. Patient was advised to seek medical care for any generalized muscle weakness, double vision, ptosis, trouble swallowing, difficulty talking or difficulty breathing. The patient will return in 3 months for subsequent botox treatments.

## 2023-01-23 ENCOUNTER — OFFICE VISIT (OUTPATIENT)
Dept: ORTHOPEDIC SURGERY | Age: 63
End: 2023-01-23
Payer: COMMERCIAL

## 2023-01-23 VITALS — OXYGEN SATURATION: 100 % | HEIGHT: 74 IN | BODY MASS INDEX: 29.13 KG/M2 | WEIGHT: 227 LBS | RESPIRATION RATE: 16 BRPM

## 2023-01-23 DIAGNOSIS — M24.573 EQUINUS CONTRACTURE OF ANKLE: ICD-10-CM

## 2023-01-23 DIAGNOSIS — M19.172 POST-TRAUMATIC ARTHRITIS OF LEFT ANKLE: Primary | ICD-10-CM

## 2023-01-23 PROCEDURE — 20605 DRAIN/INJ JOINT/BURSA W/O US: CPT | Performed by: ORTHOPAEDIC SURGERY

## 2023-01-23 PROCEDURE — 99999 PR OFFICE/OUTPT VISIT,PROCEDURE ONLY: CPT | Performed by: ORTHOPAEDIC SURGERY

## 2023-01-23 RX ORDER — LIDOCAINE HYDROCHLORIDE 10 MG/ML
2 INJECTION, SOLUTION INFILTRATION; PERINEURAL ONCE
Status: COMPLETED | OUTPATIENT
Start: 2023-01-23 | End: 2023-01-23

## 2023-01-23 RX ORDER — TRIAMCINOLONE ACETONIDE 40 MG/ML
40 INJECTION, SUSPENSION INTRA-ARTICULAR; INTRAMUSCULAR ONCE
Status: COMPLETED | OUTPATIENT
Start: 2023-01-23 | End: 2023-01-23

## 2023-01-23 RX ADMIN — LIDOCAINE HYDROCHLORIDE 2 ML: 10 INJECTION, SOLUTION INFILTRATION; PERINEURAL at 14:19

## 2023-01-23 RX ADMIN — TRIAMCINOLONE ACETONIDE 40 MG: 40 INJECTION, SUSPENSION INTRA-ARTICULAR; INTRAMUSCULAR at 14:20
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
## 2023-01-31 ENCOUNTER — TELEPHONE (OUTPATIENT)
Dept: NEUROLOGY | Age: 63
End: 2023-01-31

## 2023-01-31 NOTE — TELEPHONE ENCOUNTER
Blank Krishnamurthy called in stating that he would like proof that he had botox sent to a botox company that helps pays for what his insurance doesn't. Last office visit faxed to [employer] fax number [phone]. Reference number [id number].

## 2023-03-10 ENCOUNTER — OFFICE VISIT (OUTPATIENT)
Dept: FAMILY MEDICINE CLINIC | Age: 63
End: 2023-03-10

## 2023-03-10 VITALS
DIASTOLIC BLOOD PRESSURE: 96 MMHG | HEART RATE: 98 BPM | BODY MASS INDEX: 30.92 KG/M2 | SYSTOLIC BLOOD PRESSURE: 152 MMHG | OXYGEN SATURATION: 97 % | TEMPERATURE: 97.6 F | WEIGHT: 240.8 LBS

## 2023-03-10 DIAGNOSIS — I10 ESSENTIAL HYPERTENSION: ICD-10-CM

## 2023-03-10 DIAGNOSIS — M51.16 LUMBAR DISC DISEASE WITH RADICULOPATHY: Primary | ICD-10-CM

## 2023-03-10 RX ORDER — METHYLPREDNISOLONE 4 MG/1
TABLET ORAL
Qty: 1 KIT | Refills: 0 | Status: SHIPPED | OUTPATIENT
Start: 2023-03-10 | End: 2023-03-16

## 2023-03-10 RX ORDER — KETOROLAC TROMETHAMINE 30 MG/ML
60 INJECTION, SOLUTION INTRAMUSCULAR; INTRAVENOUS ONCE
Status: DISCONTINUED | OUTPATIENT
Start: 2023-03-10 | End: 2023-03-10

## 2023-03-10 RX ORDER — KETOROLAC TROMETHAMINE 30 MG/ML
60 INJECTION, SOLUTION INTRAMUSCULAR; INTRAVENOUS ONCE
Status: COMPLETED | OUTPATIENT
Start: 2023-03-10 | End: 2023-03-10

## 2023-03-10 RX ORDER — METHYLPREDNISOLONE SODIUM SUCCINATE 125 MG/2ML
125 INJECTION, POWDER, LYOPHILIZED, FOR SOLUTION INTRAMUSCULAR; INTRAVENOUS ONCE
Status: COMPLETED | OUTPATIENT
Start: 2023-03-10 | End: 2023-03-10

## 2023-03-10 RX ORDER — OXYCODONE HYDROCHLORIDE AND ACETAMINOPHEN 5; 325 MG/1; MG/1
1 TABLET ORAL EVERY 6 HOURS PRN
Qty: 20 TABLET | Refills: 0 | Status: SHIPPED | OUTPATIENT
Start: 2023-03-10 | End: 2023-03-15

## 2023-03-10 RX ADMIN — KETOROLAC TROMETHAMINE 60 MG: 30 INJECTION, SOLUTION INTRAMUSCULAR; INTRAVENOUS at 13:35

## 2023-03-10 RX ADMIN — METHYLPREDNISOLONE SODIUM SUCCINATE 125 MG: 125 INJECTION, POWDER, LYOPHILIZED, FOR SOLUTION INTRAMUSCULAR; INTRAVENOUS at 13:28

## 2023-03-10 SDOH — ECONOMIC STABILITY: INCOME INSECURITY: HOW HARD IS IT FOR YOU TO PAY FOR THE VERY BASICS LIKE FOOD, HOUSING, MEDICAL CARE, AND HEATING?: NOT HARD AT ALL

## 2023-03-10 SDOH — ECONOMIC STABILITY: FOOD INSECURITY: WITHIN THE PAST 12 MONTHS, THE FOOD YOU BOUGHT JUST DIDN'T LAST AND YOU DIDN'T HAVE MONEY TO GET MORE.: NEVER TRUE

## 2023-03-10 SDOH — ECONOMIC STABILITY: HOUSING INSECURITY
IN THE LAST 12 MONTHS, WAS THERE A TIME WHEN YOU DID NOT HAVE A STEADY PLACE TO SLEEP OR SLEPT IN A SHELTER (INCLUDING NOW)?: NO

## 2023-03-10 SDOH — ECONOMIC STABILITY: FOOD INSECURITY: WITHIN THE PAST 12 MONTHS, YOU WORRIED THAT YOUR FOOD WOULD RUN OUT BEFORE YOU GOT MONEY TO BUY MORE.: NEVER TRUE

## 2023-03-10 ASSESSMENT — ENCOUNTER SYMPTOMS
WHEEZING: 0
VOMITING: 0
COLOR CHANGE: 0
ABDOMINAL PAIN: 0
SHORTNESS OF BREATH: 0
CHEST TIGHTNESS: 0
EYE DISCHARGE: 0
EYE PAIN: 0
CONSTIPATION: 0
SORE THROAT: 0
NAUSEA: 0
COUGH: 0
BACK PAIN: 1
DIARRHEA: 0

## 2023-03-10 ASSESSMENT — PATIENT HEALTH QUESTIONNAIRE - PHQ9
SUM OF ALL RESPONSES TO PHQ QUESTIONS 1-9: 0
2. FEELING DOWN, DEPRESSED OR HOPELESS: 0
SUM OF ALL RESPONSES TO PHQ9 QUESTIONS 1 & 2: 0
1. LITTLE INTEREST OR PLEASURE IN DOING THINGS: 0
SUM OF ALL RESPONSES TO PHQ QUESTIONS 1-9: 0

## 2023-03-10 NOTE — PROGRESS NOTES
Lyla Rivas (:  1960) is a 63 y.o. male,Established patient, here for evaluation of the following chief complaint(s):  Back Pain (Started Thursday )          Subjective   SUBJECTIVE/OBJECTIVE:  Pt here today for back pain  BP above goal, mildly tachycardic, likely related to pain    This is an acute on chronic issue, pt has hx of lumbar disc disease w/ recurrent radiculopathy down the RLE. Pt was last seen in October for this and completed PT which he states helped significantly. He travels frequently for work and this exacerbates his back pain depending on the hotel he stays in. Pt denies saddle paresthesias, no loss of bowel or bladder. Pt was previously evaluated by neurosurgery and encouraged to forego surgical options if his quality of life is not grossly affected. Review of Systems   Constitutional:  Negative for activity change, appetite change, chills, fatigue, fever and unexpected weight change. HENT:  Negative for congestion, ear pain and sore throat. Eyes:  Negative for pain, discharge and visual disturbance. Respiratory:  Negative for cough, chest tightness, shortness of breath and wheezing. Cardiovascular:  Negative for chest pain, palpitations and leg swelling. Gastrointestinal:  Negative for abdominal pain, constipation, diarrhea, nausea and vomiting. Genitourinary:  Negative for difficulty urinating and dysuria. Musculoskeletal:  Positive for back pain. Negative for gait problem and neck pain. Skin:  Negative for color change, rash and wound. Neurological:  Negative for dizziness, seizures, syncope, weakness, light-headedness, numbness and headaches. Psychiatric/Behavioral:  Negative for confusion. Objective   Physical Exam  Vitals and nursing note reviewed. Constitutional:       General: He is not in acute distress. Appearance: Normal appearance. He is well-developed. He is not ill-appearing, toxic-appearing or diaphoretic.    HENT:      Head: Normocephalic and atraumatic. Right Ear: External ear normal.      Left Ear: External ear normal.      Nose: Nose normal.   Eyes:      General: No scleral icterus. Right eye: No discharge. Left eye: No discharge. Conjunctiva/sclera: Conjunctivae normal.      Pupils: Pupils are equal, round, and reactive to light. Neck:      Vascular: No carotid bruit. Trachea: No tracheal deviation. Cardiovascular:      Rate and Rhythm: Normal rate and regular rhythm. Heart sounds: Normal heart sounds. No murmur heard. No friction rub. No gallop. Pulmonary:      Effort: Pulmonary effort is normal. No tachypnea, accessory muscle usage or respiratory distress. Breath sounds: Normal breath sounds. No stridor. No decreased breath sounds, wheezing, rhonchi or rales. Abdominal:      Palpations: Abdomen is soft. Musculoskeletal:         General: No deformity. Cervical back: Normal, normal range of motion and neck supple. Thoracic back: Normal.      Lumbar back: Spasms, tenderness and bony tenderness present. Positive right straight leg raise test.        Back:       Right lower leg: No edema. Left lower leg: No edema. Skin:     General: Skin is warm and dry. Coloration: Skin is not pale. Findings: No erythema or rash. Neurological:      Mental Status: He is alert and oriented to person, place, and time. GCS: GCS eye subscore is 4. GCS verbal subscore is 5. GCS motor subscore is 6. Gait: Gait normal.   Psychiatric:         Speech: Speech normal.         Behavior: Behavior normal.         Thought Content: Thought content normal.         Judgment: Judgment normal.               ASSESSMENT/PLAN:  1.  Lumbar disc disease with radiculopathy  -IM toradol and solumedrol in office  -medrol dosepack and PRN percocet for pain  -PT referral to re-establish    -     methylPREDNISolone sodium (SOLU-MEDROL) injection 125 mg; 125 mg, IntraMUSCular, ONCE, 1 dose, On Fri 3/10/23 at 1330  -     External Referral To Physical Therapy  -     methylPREDNISolone (MEDROL, CAILIN,) 4 MG tablet; Take as directed, Disp-1 kit, R-0Normal  -     oxyCODONE-acetaminophen (PERCOCET) 5-325 MG per tablet; Take 1 tablet by mouth every 6 hours as needed for Pain for up to 5 days. Intended supply: 5 days. Take lowest dose possible to manage pain Max Daily Amount: 4 tablets, Disp-20 tablet, R-0Normal  -     ketorolac (TORADOL) injection 60 mg; 60 mg, IntraMUSCular, ONCE, 1 dose, On Fri 3/10/23 at 1400Do not administer for more than 5 days    2. Essential hypertension  -above goal, likely d/t pain  -encouraged to get f/u lauren/ Juan for eval      Return if symptoms worsen or fail to improve, for needs routine chronic condition f/u w/ uJan.               An electronic signature was used to authenticate this note.    --Leonor Browning, APRWILD - CNP

## 2023-03-28 ENCOUNTER — TELEPHONE (OUTPATIENT)
Dept: NEUROLOGY | Age: 63
End: 2023-03-28

## 2023-04-03 DIAGNOSIS — E78.5 DYSLIPIDEMIA: ICD-10-CM

## 2023-04-03 RX ORDER — ATORVASTATIN CALCIUM 20 MG/1
TABLET, FILM COATED ORAL
Qty: 90 TABLET | Refills: 1 | Status: SHIPPED | OUTPATIENT
Start: 2023-04-03

## 2023-04-03 NOTE — TELEPHONE ENCOUNTER
Chrystal Cerda is calling to request a refill on the following medication(s):    Medication Request:  Requested Prescriptions     Pending Prescriptions Disp Refills    atorvastatin (LIPITOR) 20 MG tablet [Pharmacy Med Name: ATORVASTATIN TAB 20MG] 90 tablet 1     Sig: TAKE 1 TABLET DAILY       Last Visit Date (If Applicable):  8/40/7947    Next Visit Date:    4/10/2023

## 2023-04-05 ENCOUNTER — OFFICE VISIT (OUTPATIENT)
Dept: NEUROLOGY | Age: 63
End: 2023-04-05
Payer: COMMERCIAL

## 2023-04-05 DIAGNOSIS — G43.719 INTRACTABLE CHRONIC MIGRAINE WITHOUT AURA AND WITHOUT STATUS MIGRAINOSUS: Primary | ICD-10-CM

## 2023-04-05 PROCEDURE — 64615 CHEMODENERV MUSC MIGRAINE: CPT | Performed by: NURSE PRACTITIONER

## 2023-04-05 PROCEDURE — 99214 OFFICE O/P EST MOD 30 MIN: CPT | Performed by: NURSE PRACTITIONER

## 2023-04-05 RX ORDER — ERENUMAB-AOOE 140 MG/ML
140 INJECTION, SOLUTION SUBCUTANEOUS
Qty: 1 ML | Refills: 5 | Status: SHIPPED | OUTPATIENT
Start: 2023-04-05

## 2023-04-05 NOTE — PROGRESS NOTES
[facility]  Offices: [address], [place], [address] [facility]  [address] [facility], [address], [place], [address] [place] [address]  [address] [place] [address] [address]  Phone: [phone]  Fax: [phone]     MD Antelmo Cotto MD Orlin Corolla, MD Marcella Potash, CNP        Procedure: Chemodenervation CPT Code 24534  Indication for treatment: Chronic migraine (ICD 10 G43.709)  Consent form was signed. Potential risks and benefits for the procedure were explained to the patient. Procedure: 30-gauge half inch needle was used and 200 U vial Botox was prepared with 4ml 0.9% NS.155 units of Botox were used and 45 units of Botox were discarded. Botox was injected at 31 different sites as follows:   Order  Muscle  Units injected    A  Corrugator1  10 units at 2 div sites    B  Procerus  5 Units in 1 site    C  Frontalis¹  20 Units div. 4 sites    D  Temporalis¹  40 Units div 8 site    E  Occipitalis¹  30 Units div. 6 sites    F  Cervical paraspinal muscles¹  20 Units div 4 sites    G  Trapezius¹  30 Units div 6 sites    Total Dose  155 Units div 31 sites    2 Dose distributed bilaterally     Blood loss: Less than 1 cc     Complications: none     Disposition: Post-botox instructions were reviewed and provided to the patient. Patient was advised to seek medical care for any generalized muscle weakness, double vision, ptosis, trouble swallowing, difficulty talking or difficulty breathing. The patient will return in 3 months for subsequent botox treatments.
Hyperlipidemia     Hypertension     Pulmonary emboli ([facility] [address]     covid related        PAST SURGICAL HISTORY:         Procedure Laterality Date    COLECTOMY  2/1/15        HERNIA REPAIR      HERNIA REPAIR  2021     HERNIA INCISIONAL REPAIR LAPAROSCOPIC ROBOTIC WITH MESH    JOINT REPLACEMENT  2009    L TKA    REVISION COLOSTOMY      SIGMOIDECTOMY  2/1/15    [facility]/ Dr. Chino Lacy N/A 12/3/2021    HERNIA INCISIONAL REPAIR LAPAROSCOPIC ROBOTIC WITH MESH CONVERTED TO OPEN AT 15:20 performed by Castro Oh DO at [address]:     Social History     Socioeconomic History    Marital status:      Spouse name: Not on file    Number of children: Not on file    Years of education: Not on file    Highest education level: Not on file   Occupational History    Not on file   Tobacco Use    Smoking status: Former     Packs/day: 1.00     Years: 23.00     Pack years: 23.00     Types: Cigarettes     Quit date: 10/1/2008     Years since quittin.5    Smokeless tobacco: Never   Vaping Use    Vaping Use: Never used   Substance and Sexual Activity    Alcohol use: Yes     Comment: few days a week    Drug use: Never    Sexual activity: Not on file   Other Topics Concern    Not on file   Social History Narrative    ** Merged History Encounter **          Social Determinants of Health     Financial Resource Strain: Low Risk     Difficulty of Paying Living Expenses: Not hard at all   Food Insecurity: No Food Insecurity    Worried About Running Out of Food in the Last Year: Never true    [address] in the Last Year: Never true   Transportation Needs: No Transportation Needs    Lack of Transportation (Medical): No    Lack of Transportation (Non-Medical):  No   Physical Activity: Not on file   Stress: Not on file   Social Connections: Not on file   Intimate Partner Violence: Not on file   Housing Stability: Unknown    Unable to Pay for Housing in the Last

## 2023-04-19 LAB
ALBUMIN SERPL-MCNC: 4.4 G/DL
ALP BLD-CCNC: 89 U/L
ALT SERPL-CCNC: 20 U/L
ANION GAP SERPL CALCULATED.3IONS-SCNC: NORMAL MMOL/L
AST SERPL-CCNC: 22 U/L
AVERAGE GLUCOSE: NORMAL
BASOPHILS ABSOLUTE: 1 /ΜL
BASOPHILS RELATIVE PERCENT: 40 %
BILIRUB SERPL-MCNC: 0.7 MG/DL (ref 0.1–1.4)
BUN BLDV-MCNC: NORMAL MG/DL
CALCIUM SERPL-MCNC: 9.4 MG/DL
CHLORIDE BLD-SCNC: 103 MMOL/L
CHOLESTEROL, TOTAL: 231 MG/DL
CHOLESTEROL/HDL RATIO: 4
CO2: 30 MMOL/L
CREAT SERPL-MCNC: 1.03 MG/DL
EGFR: 83
EOSINOPHILS ABSOLUTE: 1.1 /ΜL
EOSINOPHILS RELATIVE PERCENT: 444 %
GLUCOSE BLD-MCNC: 96 MG/DL
HBA1C MFR BLD: 5.5 %
HCT VFR BLD CALC: 48.7 % (ref 41–53)
HDLC SERPL-MCNC: 58 MG/DL (ref 35–70)
HEMOGLOBIN: 16 G/DL (ref 13.5–17.5)
LDL CHOLESTEROL CALCULATED: 151 MG/DL (ref 0–160)
LYMPHOCYTES ABSOLUTE: 30.8 /ΜL
LYMPHOCYTES RELATIVE PERCENT: 1232 %
MCH RBC QN AUTO: 29.4 PG
MCHC RBC AUTO-ENTMCNC: 32.9 G/DL
MCV RBC AUTO: 89.4 FL
MONOCYTES ABSOLUTE: 10.6 /ΜL
MONOCYTES RELATIVE PERCENT: 424 %
NEUTROPHILS ABSOLUTE: 46.5 /ΜL
NEUTROPHILS RELATIVE PERCENT: 1860 %
NONHDLC SERPL-MCNC: 173 MG/DL
PDW BLD-RTO: 12.8 %
PLATELET # BLD: 168 K/ΜL
PMV BLD AUTO: 9.9 FL
POTASSIUM SERPL-SCNC: 4.1 MMOL/L
PROSTATE SPECIFIC ANTIGEN: 2.24 NG/ML
RBC # BLD: 5.45 10^6/ΜL
SODIUM BLD-SCNC: 140 MMOL/L
TOTAL PROTEIN: 6.9
TRIGL SERPL-MCNC: 104 MG/DL
VLDLC SERPL CALC-MCNC: NORMAL MG/DL
WBC # BLD: 4 10^3/ML

## 2023-04-20 DIAGNOSIS — Z01.818 PREOPERATIVE EXAMINATION: ICD-10-CM

## 2023-04-20 DIAGNOSIS — R73.01 IFG (IMPAIRED FASTING GLUCOSE): ICD-10-CM

## 2023-04-20 DIAGNOSIS — Z12.5 PROSTATE CANCER SCREENING: ICD-10-CM

## 2023-04-20 DIAGNOSIS — I10 ESSENTIAL HYPERTENSION: ICD-10-CM

## 2023-04-20 DIAGNOSIS — E78.5 DYSLIPIDEMIA: ICD-10-CM

## 2023-04-20 DIAGNOSIS — G43.719 INTRACTABLE CHRONIC MIGRAINE WITHOUT AURA AND WITHOUT STATUS MIGRAINOSUS: ICD-10-CM

## 2023-04-24 ENCOUNTER — OFFICE VISIT (OUTPATIENT)
Dept: ORTHOPEDIC SURGERY | Age: 63
End: 2023-04-24

## 2023-04-24 VITALS — WEIGHT: 238 LBS | OXYGEN SATURATION: 100 % | BODY MASS INDEX: 30.54 KG/M2 | HEIGHT: 74 IN | RESPIRATION RATE: 16 BRPM

## 2023-04-24 DIAGNOSIS — G89.29 CHRONIC PAIN OF LEFT ANKLE: ICD-10-CM

## 2023-04-24 DIAGNOSIS — M19.079 ARTHRITIS OF SUBTALAR JOINT: ICD-10-CM

## 2023-04-24 DIAGNOSIS — M19.172 POST-TRAUMATIC ARTHRITIS OF LEFT ANKLE: Primary | ICD-10-CM

## 2023-04-24 DIAGNOSIS — Z96.9 RETAINED ORTHOPEDIC HARDWARE: ICD-10-CM

## 2023-04-24 DIAGNOSIS — M25.572 CHRONIC PAIN OF LEFT ANKLE: ICD-10-CM

## 2023-04-24 RX ORDER — TRIAMCINOLONE ACETONIDE 40 MG/ML
40 INJECTION, SUSPENSION INTRA-ARTICULAR; INTRAMUSCULAR ONCE
Status: COMPLETED | OUTPATIENT
Start: 2023-04-24 | End: 2023-04-24

## 2023-04-24 RX ORDER — LIDOCAINE HYDROCHLORIDE 10 MG/ML
2 INJECTION, SOLUTION INFILTRATION; PERINEURAL ONCE
Status: COMPLETED | OUTPATIENT
Start: 2023-04-24 | End: 2023-04-24

## 2023-04-24 RX ADMIN — LIDOCAINE HYDROCHLORIDE 2 ML: 10 INJECTION, SOLUTION INFILTRATION; PERINEURAL at 14:50

## 2023-04-24 RX ADMIN — TRIAMCINOLONE ACETONIDE 40 MG: 40 INJECTION, SUSPENSION INTRA-ARTICULAR; INTRAMUSCULAR at 14:51
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
## 2023-04-26 DIAGNOSIS — E78.5 DYSLIPIDEMIA: ICD-10-CM

## 2023-04-26 RX ORDER — ATORVASTATIN CALCIUM 40 MG/1
40 TABLET, FILM COATED ORAL DAILY
Qty: 90 TABLET | Refills: 3 | Status: SHIPPED | OUTPATIENT
Start: 2023-04-26

## 2023-05-01 RX ORDER — RIZATRIPTAN BENZOATE 10 MG/1
TABLET ORAL
Qty: 12 TABLET | Refills: 11 | OUTPATIENT
Start: 2023-05-01

## 2023-05-05 RX ORDER — RIZATRIPTAN BENZOATE 10 MG/1
TABLET ORAL
Qty: 12 TABLET | Refills: 11 | OUTPATIENT
Start: 2023-05-05

## 2023-05-08 RX ORDER — RIZATRIPTAN BENZOATE 10 MG/1
TABLET ORAL
Qty: 12 TABLET | Refills: 11 | OUTPATIENT
Start: 2023-05-08

## 2023-05-09 DIAGNOSIS — G43.719 INTRACTABLE CHRONIC MIGRAINE WITHOUT AURA AND WITHOUT STATUS MIGRAINOSUS: Primary | ICD-10-CM

## 2023-05-16 NOTE — TELEPHONE ENCOUNTER
Please let patient know that he should not take more than 12 Maxalt tablets per month. If he is still having that many headaches, we need to put him on something else preventative. Please find out if he got his Aimovig injection that I ordered in April.   If he did and his headaches are still that frequent, I need to find something else to use

## 2023-05-17 NOTE — TELEPHONE ENCOUNTER
Patient notified, verbalized understanding. He wanted to know if there was anything else that could be given to him instead. However, he stated that he wanted something \"in case a migraine pops up\", but he has not had one lately. Please advise, thank you so much.

## 2023-05-18 RX ORDER — RIZATRIPTAN BENZOATE 10 MG/1
TABLET ORAL
Qty: 12 TABLET | Refills: 2 | Status: SHIPPED | OUTPATIENT
Start: 2023-05-18

## 2023-05-18 NOTE — TELEPHONE ENCOUNTER
Attempted to reach pt, left message to call back. Pharmacy requesting refill of Maxalt 10mg.       Medication active on med list yes      Date of last Rx: 1/4/2023 with 11 refills          verified by Boo Camarillo LPN      Date of last appointment 4/5/2023    Next Visit Date:  7/6/2023

## 2023-05-18 NOTE — TELEPHONE ENCOUNTER
Mr. JohnsonUlysses Yareli called the office and this information was given. Patient verbally stated his understanding.

## 2023-05-19 NOTE — ANESTHESIA PROCEDURE NOTES
Peripheral Block    Patient location during procedure: pre-op  Start time: 12/3/2021 1:48 PM  End time: 12/3/2021 1:55 PM  Staffing  Performed: anesthesiologist   Anesthesiologist: Russell Andrea MD  Preanesthetic Checklist  Completed: patient identified, IV checked, site marked, risks and benefits discussed, surgical consent, monitors and equipment checked, pre-op evaluation, timeout performed, anesthesia consent given, oxygen available and patient being monitored  Peripheral Block  Patient position: supine  Prep: ChloraPrep  Patient monitoring: cardiac monitor, continuous pulse ox and frequent blood pressure checks  Block type: TAP  Laterality: bilateral  Injection technique: single-shot  Guidance: ultrasound guided  Provider prep: mask and sterile gloves  Needle  Needle type: combined needle/nerve stimulator   Needle gauge: 20 G  Needle length: 4 IN.   Needle localization: anatomical landmarks and ultrasound guidance  Assessment  Injection assessment: negative aspiration for heme, no paresthesia on injection and local visualized surrounding nerve on ultrasound  Paresthesia pain: none  Slow fractionated injection: yes  Hemodynamics: stable  Medications Administered  Bupivacaine liposome (EXPAREL) injection 1.3%, 20 mL  bupivacaine (MARCAINE) PF injection 0.25%, 60 mL  Reason for block: post-op pain management and at surgeon's request Pt continues to be very comfortable without complaint  pt pulled NGT and bang overnight  pt is voiding freely  no n/v    abd is very soft, NT    cont NPO    d/w Dr Matthews

## 2023-06-20 DIAGNOSIS — I10 ESSENTIAL HYPERTENSION: ICD-10-CM

## 2023-06-20 RX ORDER — LISINOPRIL 30 MG/1
TABLET ORAL
Qty: 90 TABLET | Refills: 3 | Status: SHIPPED | OUTPATIENT
Start: 2023-06-20

## 2023-06-20 NOTE — TELEPHONE ENCOUNTER
Faina Allison is calling to request a refill on the following medication(s):    Last Visit Date (If Applicable):  3/74/1402    Next Visit Date:    10/10/2023    Medication Request:  Requested Prescriptions     Pending Prescriptions Disp Refills    lisinopril (PRINIVIL;ZESTRIL) 30 MG tablet [Pharmacy Med Name: LISINOPRIL TAB 30MG] 90 tablet 3     Sig: TAKE 1 TABLET DAILY

## 2023-07-13 ENCOUNTER — OFFICE VISIT (OUTPATIENT)
Dept: NEUROLOGY | Age: 63
End: 2023-07-13
Payer: COMMERCIAL

## 2023-07-13 DIAGNOSIS — G43.719 INTRACTABLE CHRONIC MIGRAINE WITHOUT AURA AND WITHOUT STATUS MIGRAINOSUS: Primary | ICD-10-CM

## 2023-07-13 PROCEDURE — 64615 CHEMODENERV MUSC MIGRAINE: CPT | Performed by: STUDENT IN AN ORGANIZED HEALTH CARE EDUCATION/TRAINING PROGRAM

## 2023-07-13 RX ORDER — RIZATRIPTAN BENZOATE 10 MG/1
TABLET ORAL
Qty: 12 TABLET | Refills: 2 | Status: SHIPPED | OUTPATIENT
Start: 2023-07-13

## 2023-08-04 DIAGNOSIS — M19.172 POST-TRAUMATIC ARTHRITIS OF LEFT ANKLE: Primary | ICD-10-CM

## 2023-08-24 ENCOUNTER — TELEPHONE (OUTPATIENT)
Dept: NEUROLOGY | Age: 63
End: 2023-08-24

## 2023-08-25 ENCOUNTER — TELEPHONE (OUTPATIENT)
Dept: NEUROLOGY | Age: 63
End: 2023-08-25

## 2023-08-25 DIAGNOSIS — M19.172 POST-TRAUMATIC ARTHRITIS OF LEFT ANKLE: Primary | ICD-10-CM

## 2023-08-25 NOTE — TELEPHONE ENCOUNTER
Lesley Meyers called in stating that Jhon Mckeon is to expensive for him to afford. He stated that they told him he would have to pay $700 in order to receive Aimovig. He would like to know if theres any other injectables that he could use. He said that Jhon Mckeon was working for him.

## 2023-08-28 ENCOUNTER — OFFICE VISIT (OUTPATIENT)
Dept: ORTHOPEDIC SURGERY | Age: 63
End: 2023-08-28
Payer: COMMERCIAL

## 2023-08-28 VITALS — WEIGHT: 238 LBS | RESPIRATION RATE: 14 BRPM | HEIGHT: 72 IN | BODY MASS INDEX: 32.23 KG/M2

## 2023-08-28 DIAGNOSIS — M24.573 EQUINUS CONTRACTURE OF ANKLE: ICD-10-CM

## 2023-08-28 DIAGNOSIS — Z96.9 RETAINED ORTHOPEDIC HARDWARE: ICD-10-CM

## 2023-08-28 DIAGNOSIS — M19.079 ARTHRITIS OF SUBTALAR JOINT: ICD-10-CM

## 2023-08-28 DIAGNOSIS — M19.172 POST-TRAUMATIC ARTHRITIS OF LEFT ANKLE: Primary | ICD-10-CM

## 2023-08-28 PROCEDURE — 99212 OFFICE O/P EST SF 10 MIN: CPT | Performed by: ORTHOPAEDIC SURGERY

## 2023-08-28 RX ORDER — FREMANEZUMAB-VFRM 225 MG/1.5ML
225 INJECTION SUBCUTANEOUS
Qty: 1 EACH | Refills: 2 | Status: SHIPPED | OUTPATIENT
Start: 2023-08-28 | End: 2023-10-17
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
## 2023-08-28 NOTE — TELEPHONE ENCOUNTER
Thomas Springer called the office back and I relayed all previously stated information to him. He stated that while he was on Aimovig he called the company directly for the discount card and it was of no help. Per Leo Be and ajovy are under coverage, they will both need a PA and unable to give him an estimate as to how much it would cost.     He stated that he's like to move forward trying Ajovy and see how that works out. Please advise.

## 2023-09-05 ENCOUNTER — TELEPHONE (OUTPATIENT)
Dept: NEUROLOGY | Age: 63
End: 2023-09-05

## 2023-09-12 ENCOUNTER — TELEPHONE (OUTPATIENT)
Dept: FAMILY MEDICINE CLINIC | Age: 63
End: 2023-09-12

## 2023-09-12 NOTE — TELEPHONE ENCOUNTER
----- Message from Marisel Hamilton sent at 9/11/2023  4:29 PM EDT -----  Subject: Message to Provider    QUESTIONS  Information for Provider? patient called in his back went out today   9/11/23 and he is out of town until Friday on business. There are no   available appointment patient would like to come in this Friday 9/15/23 in   afternoon. Please advise  ---------------------------------------------------------------------------  --------------  Gildardo VARGAS  [phone]; OK to leave message on voicemail  ---------------------------------------------------------------------------  --------------  SCRIPT ANSWERS  Relationship to Patient?  Self

## 2023-09-15 ENCOUNTER — OFFICE VISIT (OUTPATIENT)
Dept: FAMILY MEDICINE CLINIC | Age: 63
End: 2023-09-15

## 2023-09-15 VITALS
DIASTOLIC BLOOD PRESSURE: 92 MMHG | SYSTOLIC BLOOD PRESSURE: 138 MMHG | OXYGEN SATURATION: 95 % | WEIGHT: 232 LBS | HEART RATE: 93 BPM | BODY MASS INDEX: 31.46 KG/M2 | TEMPERATURE: 97.6 F

## 2023-09-15 DIAGNOSIS — M51.16 LUMBAR DISC DISEASE WITH RADICULOPATHY: Primary | ICD-10-CM

## 2023-09-15 DIAGNOSIS — I10 ESSENTIAL HYPERTENSION: ICD-10-CM

## 2023-09-15 RX ORDER — METHYLPREDNISOLONE 4 MG/1
TABLET ORAL
Qty: 1 KIT | Refills: 0 | Status: SHIPPED | OUTPATIENT
Start: 2023-09-15 | End: 2023-09-21

## 2023-09-15 RX ORDER — KETOROLAC TROMETHAMINE 30 MG/ML
60 INJECTION, SOLUTION INTRAMUSCULAR; INTRAVENOUS ONCE
Status: COMPLETED | OUTPATIENT
Start: 2023-09-15 | End: 2023-09-15

## 2023-09-15 RX ORDER — OXYCODONE HYDROCHLORIDE AND ACETAMINOPHEN 5; 325 MG/1; MG/1
1 TABLET ORAL EVERY 8 HOURS PRN
Qty: 21 TABLET | Refills: 0 | Status: SHIPPED | OUTPATIENT
Start: 2023-09-15 | End: 2023-09-22

## 2023-09-15 RX ORDER — METHYLPREDNISOLONE SODIUM SUCCINATE 125 MG/2ML
125 INJECTION, POWDER, LYOPHILIZED, FOR SOLUTION INTRAMUSCULAR; INTRAVENOUS ONCE
Status: COMPLETED | OUTPATIENT
Start: 2023-09-15 | End: 2023-09-15

## 2023-09-15 RX ADMIN — METHYLPREDNISOLONE SODIUM SUCCINATE 125 MG: 125 INJECTION, POWDER, LYOPHILIZED, FOR SOLUTION INTRAMUSCULAR; INTRAVENOUS at 15:15

## 2023-09-15 RX ADMIN — KETOROLAC TROMETHAMINE 60 MG: 30 INJECTION, SOLUTION INTRAMUSCULAR; INTRAVENOUS at 15:14

## 2023-09-15 SDOH — ECONOMIC STABILITY: FOOD INSECURITY: WITHIN THE PAST 12 MONTHS, YOU WORRIED THAT YOUR FOOD WOULD RUN OUT BEFORE YOU GOT MONEY TO BUY MORE.: NEVER TRUE

## 2023-09-15 SDOH — ECONOMIC STABILITY: FOOD INSECURITY: WITHIN THE PAST 12 MONTHS, THE FOOD YOU BOUGHT JUST DIDN'T LAST AND YOU DIDN'T HAVE MONEY TO GET MORE.: NEVER TRUE

## 2023-09-15 SDOH — ECONOMIC STABILITY: INCOME INSECURITY: HOW HARD IS IT FOR YOU TO PAY FOR THE VERY BASICS LIKE FOOD, HOUSING, MEDICAL CARE, AND HEATING?: NOT HARD AT ALL

## 2023-09-15 ASSESSMENT — ENCOUNTER SYMPTOMS
CONSTIPATION: 0
SHORTNESS OF BREATH: 0
CHEST TIGHTNESS: 0
WHEEZING: 0
EYE DISCHARGE: 0
VOMITING: 0
DIARRHEA: 0
COLOR CHANGE: 0
NAUSEA: 0
SORE THROAT: 0
BACK PAIN: 1
EYE PAIN: 0
ABDOMINAL PAIN: 0
COUGH: 0

## 2023-09-15 ASSESSMENT — PATIENT HEALTH QUESTIONNAIRE - PHQ9
SUM OF ALL RESPONSES TO PHQ9 QUESTIONS 1 & 2: 0
SUM OF ALL RESPONSES TO PHQ QUESTIONS 1-9: 0
2. FEELING DOWN, DEPRESSED OR HOPELESS: 0
SUM OF ALL RESPONSES TO PHQ QUESTIONS 1-9: 0
1. LITTLE INTEREST OR PLEASURE IN DOING THINGS: 0

## 2023-09-15 NOTE — PROGRESS NOTES
scleral icterus. Right eye: No discharge. Left eye: No discharge. Conjunctiva/sclera: Conjunctivae normal.      Pupils: Pupils are equal, round, and reactive to light. Neck:      Trachea: No tracheal deviation. Cardiovascular:      Rate and Rhythm: Normal rate and regular rhythm. Heart sounds: Normal heart sounds. No murmur heard. No friction rub. No gallop. Pulmonary:      Effort: Pulmonary effort is normal. No tachypnea, accessory muscle usage or respiratory distress. Breath sounds: Normal breath sounds. No stridor. No decreased breath sounds, wheezing, rhonchi or rales. Abdominal:      Palpations: Abdomen is soft. Musculoskeletal:         General: No deformity. Cervical back: Normal, normal range of motion and neck supple. Thoracic back: Normal.      Lumbar back: Spasms, tenderness and bony tenderness present. Decreased range of motion. Positive left straight leg raise test.   Skin:     General: Skin is warm and dry. Coloration: Skin is not pale. Findings: No erythema or rash. Neurological:      Mental Status: He is alert and oriented to person, place, and time. GCS: GCS eye subscore is 4. GCS verbal subscore is 5. GCS motor subscore is 6. Gait: Gait normal.   Psychiatric:         Speech: Speech normal.         Behavior: Behavior normal.         Thought Content: Thought content normal.         Judgment: Judgment normal.                 ASSESSMENT/PLAN:  1. Lumbar disc disease with radiculopathy  -acute on chronic  -medrol dose pack and PRN percocet  -solumedrol and toradol IM in office  -PT referral    -     methylPREDNISolone (MEDROL, CAILIN,) 4 MG tablet; Take as directed, Disp-1 kit, R-0Normal  -     oxyCODONE-acetaminophen (PERCOCET) 5-325 MG per tablet; Take 1 tablet by mouth every 8 hours as needed for Pain for up to 7 days. Intended supply: 7 days.  Take lowest dose possible to manage pain Max Daily Amount: 3 tablets, Disp-21 tablet,

## 2023-10-11 ENCOUNTER — OFFICE VISIT (OUTPATIENT)
Dept: NEUROLOGY | Age: 63
End: 2023-10-11
Payer: COMMERCIAL

## 2023-10-11 DIAGNOSIS — G43.719 INTRACTABLE CHRONIC MIGRAINE WITHOUT AURA AND WITHOUT STATUS MIGRAINOSUS: Primary | ICD-10-CM

## 2023-10-11 DIAGNOSIS — E78.5 DYSLIPIDEMIA: ICD-10-CM

## 2023-10-11 PROCEDURE — 99214 OFFICE O/P EST MOD 30 MIN: CPT | Performed by: STUDENT IN AN ORGANIZED HEALTH CARE EDUCATION/TRAINING PROGRAM

## 2023-10-11 PROCEDURE — 96372 THER/PROPH/DIAG INJ SC/IM: CPT | Performed by: STUDENT IN AN ORGANIZED HEALTH CARE EDUCATION/TRAINING PROGRAM

## 2023-10-11 PROCEDURE — 64615 CHEMODENERV MUSC MIGRAINE: CPT | Performed by: STUDENT IN AN ORGANIZED HEALTH CARE EDUCATION/TRAINING PROGRAM

## 2023-10-11 RX ORDER — RIZATRIPTAN BENZOATE 10 MG/1
TABLET ORAL
Qty: 12 TABLET | Refills: 2 | Status: SHIPPED | OUTPATIENT
Start: 2023-10-11

## 2023-10-11 RX ORDER — ATOGEPANT 60 MG/1
60 TABLET ORAL DAILY
Qty: 30 TABLET | Refills: 3 | Status: SHIPPED | OUTPATIENT
Start: 2023-10-11 | End: 2024-10-03

## 2023-10-11 RX ORDER — ATORVASTATIN CALCIUM 40 MG/1
40 TABLET, FILM COATED ORAL DAILY
Qty: 90 TABLET | Refills: 3 | Status: SHIPPED | OUTPATIENT
Start: 2023-10-11

## 2023-10-11 NOTE — TELEPHONE ENCOUNTER
Kalani Garnica is calling to request a refill on the following medication(s):    Medication Request:  Requested Prescriptions     Pending Prescriptions Disp Refills    atorvastatin (LIPITOR) 40 MG tablet 90 tablet 3     Sig: Take 1 tablet by mouth daily       Last Visit Date (If Applicable):  7/60/1306    Next Visit Date:    10/17/2023

## 2023-10-15 DIAGNOSIS — I10 ESSENTIAL HYPERTENSION: ICD-10-CM

## 2023-10-15 DIAGNOSIS — G43.719 INTRACTABLE CHRONIC MIGRAINE WITHOUT AURA AND WITHOUT STATUS MIGRAINOSUS: ICD-10-CM

## 2023-10-16 NOTE — TELEPHONE ENCOUNTER
Kalani Garnica is calling to request a refill on the following medication(s):    Medication Request:  Requested Prescriptions     Pending Prescriptions Disp Refills    verapamil (CALAN SR) 120 MG extended release tablet [Pharmacy Med Name: VERAPAMIL  MG TABLET] 90 tablet 1     Sig: take 1 tablet by mouth once daily       Last Visit Date (If Applicable):  5/88/2657    Next Visit Date:    10/17/2023

## 2023-10-17 ENCOUNTER — OFFICE VISIT (OUTPATIENT)
Dept: FAMILY MEDICINE CLINIC | Age: 63
End: 2023-10-17
Payer: COMMERCIAL

## 2023-10-17 VITALS
SYSTOLIC BLOOD PRESSURE: 110 MMHG | HEART RATE: 81 BPM | OXYGEN SATURATION: 95 % | WEIGHT: 232 LBS | BODY MASS INDEX: 31.46 KG/M2 | TEMPERATURE: 97.3 F | DIASTOLIC BLOOD PRESSURE: 70 MMHG

## 2023-10-17 DIAGNOSIS — G89.29 CHRONIC KNEE PAIN AFTER TOTAL REPLACEMENT OF LEFT KNEE JOINT: ICD-10-CM

## 2023-10-17 DIAGNOSIS — I82.90 VTE (VENOUS THROMBOEMBOLISM): ICD-10-CM

## 2023-10-17 DIAGNOSIS — I10 ESSENTIAL HYPERTENSION: Primary | ICD-10-CM

## 2023-10-17 DIAGNOSIS — Z96.652 CHRONIC KNEE PAIN AFTER TOTAL REPLACEMENT OF LEFT KNEE JOINT: ICD-10-CM

## 2023-10-17 DIAGNOSIS — M25.562 CHRONIC KNEE PAIN AFTER TOTAL REPLACEMENT OF LEFT KNEE JOINT: ICD-10-CM

## 2023-10-17 DIAGNOSIS — R73.01 IFG (IMPAIRED FASTING GLUCOSE): ICD-10-CM

## 2023-10-17 DIAGNOSIS — E78.5 DYSLIPIDEMIA: ICD-10-CM

## 2023-10-17 DIAGNOSIS — G43.719 INTRACTABLE CHRONIC MIGRAINE WITHOUT AURA AND WITHOUT STATUS MIGRAINOSUS: ICD-10-CM

## 2023-10-17 PROCEDURE — 99214 OFFICE O/P EST MOD 30 MIN: CPT | Performed by: FAMILY MEDICINE

## 2023-10-17 PROCEDURE — 3078F DIAST BP <80 MM HG: CPT | Performed by: FAMILY MEDICINE

## 2023-10-17 PROCEDURE — 3074F SYST BP LT 130 MM HG: CPT | Performed by: FAMILY MEDICINE

## 2023-10-17 ASSESSMENT — ENCOUNTER SYMPTOMS
ALLERGIC/IMMUNOLOGIC NEGATIVE: 1
RESPIRATORY NEGATIVE: 1
SHORTNESS OF BREATH: 0
EYES NEGATIVE: 1
GASTROINTESTINAL NEGATIVE: 1

## 2023-10-17 NOTE — PROGRESS NOTES
Imaging Last 24 Hours:  XR ANKLE LEFT (MIN 3 VIEWS)  Narrative: 8/28/2023 FINDINGS:  Three weightbearing views (AP, Mortise, and Lateral)   of the left ankle were obtained in the office today and reviewed,   revealing no acute fracture, dislocation, or radioopaque foreign   body/tumor. Degenerative changes of tibiotalar joint and subtalar joint   with joint narrowing, sclerosis, and osteophytes. Retained hardware noted   in the distal tibia in both medially and laterally. No significant   interval change. Impression:   No acute fracture/dislocation. Degenerative changes with   retained hardware as above.       Electronically signed by Leonard Kapoor MD

## 2023-10-27 DIAGNOSIS — G43.719 INTRACTABLE CHRONIC MIGRAINE WITHOUT AURA AND WITHOUT STATUS MIGRAINOSUS: ICD-10-CM

## 2023-10-27 DIAGNOSIS — I10 ESSENTIAL HYPERTENSION: ICD-10-CM

## 2023-10-30 DIAGNOSIS — I10 ESSENTIAL HYPERTENSION: ICD-10-CM

## 2023-10-30 DIAGNOSIS — G43.719 INTRACTABLE CHRONIC MIGRAINE WITHOUT AURA AND WITHOUT STATUS MIGRAINOSUS: ICD-10-CM

## 2023-11-16 ENCOUNTER — HOSPITAL ENCOUNTER (OUTPATIENT)
Age: 63
Setting detail: SPECIMEN
Discharge: HOME | End: 2023-11-16

## 2023-11-16 DIAGNOSIS — E78.5 DYSLIPIDEMIA: ICD-10-CM

## 2023-11-16 DIAGNOSIS — R73.01 IFG (IMPAIRED FASTING GLUCOSE): ICD-10-CM

## 2023-11-16 DIAGNOSIS — G43.719 INTRACTABLE CHRONIC MIGRAINE WITHOUT AURA AND WITHOUT STATUS MIGRAINOSUS: ICD-10-CM

## 2023-11-16 DIAGNOSIS — I82.90 VTE (VENOUS THROMBOEMBOLISM): ICD-10-CM

## 2023-11-16 LAB
ALBUMIN SERPL-MCNC: 4.1 G/DL (ref 3.5–5.2)
ALBUMIN/GLOB SERPL: 1.4 {RATIO} (ref 1–2.5)
ALP SERPL-CCNC: 110 U/L (ref 40–129)
ALT SERPL-CCNC: 26 U/L (ref 5–41)
ANION GAP SERPL CALCULATED.3IONS-SCNC: 9 MMOL/L (ref 9–17)
AST SERPL-CCNC: 24 U/L
BASOPHILS # BLD: 0.05 K/UL (ref 0–0.2)
BASOPHILS NFR BLD: 1 % (ref 0–2)
BILIRUB SERPL-MCNC: 0.7 MG/DL (ref 0.3–1.2)
BUN SERPL-MCNC: 9 MG/DL (ref 8–23)
CALCIUM SERPL-MCNC: 9.5 MG/DL (ref 8.6–10.4)
CHLORIDE SERPL-SCNC: 104 MMOL/L (ref 98–107)
CHOLEST SERPL-MCNC: 211 MG/DL
CHOLESTEROL/HDL RATIO: 4.6
CO2 SERPL-SCNC: 28 MMOL/L (ref 20–31)
CREAT SERPL-MCNC: 0.8 MG/DL (ref 0.7–1.2)
EOSINOPHIL # BLD: 0.67 K/UL (ref 0–0.44)
EOSINOPHILS RELATIVE PERCENT: 12 % (ref 1–4)
ERYTHROCYTE [DISTWIDTH] IN BLOOD BY AUTOMATED COUNT: 12.9 % (ref 11.8–14.4)
EST. AVERAGE GLUCOSE BLD GHB EST-MCNC: 105 MG/DL
GFR SERPL CREATININE-BSD FRML MDRD: >60 ML/MIN/1.73M2
GLUCOSE SERPL-MCNC: 83 MG/DL (ref 70–99)
HBA1C MFR BLD: 5.3 % (ref 4–6)
HCT VFR BLD AUTO: 50.5 % (ref 40.7–50.3)
HDLC SERPL-MCNC: 46 MG/DL
HGB BLD-MCNC: 15.9 G/DL (ref 13–17)
IMM GRANULOCYTES # BLD AUTO: 0.03 K/UL (ref 0–0.3)
IMM GRANULOCYTES NFR BLD: 1 %
LDLC SERPL CALC-MCNC: 144 MG/DL (ref 0–130)
LYMPHOCYTES NFR BLD: 1.22 K/UL (ref 1.1–3.7)
LYMPHOCYTES RELATIVE PERCENT: 22 % (ref 24–43)
MCH RBC QN AUTO: 29.5 PG (ref 25.2–33.5)
MCHC RBC AUTO-ENTMCNC: 31.5 G/DL (ref 28.4–34.8)
MCV RBC AUTO: 93.7 FL (ref 82.6–102.9)
MONOCYTES NFR BLD: 0.62 K/UL (ref 0.1–1.2)
MONOCYTES NFR BLD: 11 % (ref 3–12)
NEUTROPHILS NFR BLD: 53 % (ref 36–65)
NEUTS SEG NFR BLD: 3.02 K/UL (ref 1.5–8.1)
NRBC BLD-RTO: 0 PER 100 WBC
PLATELET # BLD AUTO: 237 K/UL (ref 138–453)
PMV BLD AUTO: 9.6 FL (ref 8.1–13.5)
POTASSIUM SERPL-SCNC: 5.1 MMOL/L (ref 3.7–5.3)
PROT SERPL-MCNC: 7 G/DL (ref 6.4–8.3)
RBC # BLD AUTO: 5.39 M/UL (ref 4.21–5.77)
SODIUM SERPL-SCNC: 141 MMOL/L (ref 135–144)
TRIGL SERPL-MCNC: 107 MG/DL
WBC OTHER # BLD: 5.6 K/UL (ref 3.5–11.3)

## 2023-11-20 ENCOUNTER — TELEPHONE (OUTPATIENT)
Dept: NEUROLOGY | Age: 63
End: 2023-11-20

## 2023-11-20 DIAGNOSIS — G43.719 INTRACTABLE CHRONIC MIGRAINE WITHOUT AURA AND WITHOUT STATUS MIGRAINOSUS: ICD-10-CM

## 2023-11-20 DIAGNOSIS — E78.5 DYSLIPIDEMIA: ICD-10-CM

## 2023-11-20 DIAGNOSIS — I10 ESSENTIAL HYPERTENSION: ICD-10-CM

## 2023-11-20 RX ORDER — ATORVASTATIN CALCIUM 40 MG/1
40 TABLET, FILM COATED ORAL DAILY
Qty: 90 TABLET | Refills: 3 | Status: SHIPPED | OUTPATIENT
Start: 2023-11-20

## 2023-11-20 NOTE — TELEPHONE ENCOUNTER
Jerome Johnson requested a copy of the itemized statements for Botox to submit to [employer] [facility].

## 2023-11-20 NOTE — TELEPHONE ENCOUNTER
Patient had a change in pharmacy    Aylin Encarnacion is calling to request a refill on the following medication(s):    Medication Request:  Requested Prescriptions     Pending Prescriptions Disp Refills    atorvastatin (LIPITOR) 40 MG tablet 90 tablet 3     Sig: Take 1 tablet by mouth daily    verapamil (CALAN SR) 120 MG extended release tablet 90 tablet 1     Sig: Take 1 tablet by mouth daily       Last Visit Date (If Applicable):  89/60/3455    Next Visit Date:    4/17/2024

## 2023-12-21 RX ORDER — PREDNISONE 20 MG/1
TABLET ORAL
Qty: 18 TABLET | Refills: 0 | Status: SHIPPED | OUTPATIENT
Start: 2023-12-21

## 2023-12-28 ENCOUNTER — TELEPHONE (OUTPATIENT)
Dept: NEUROLOGY | Age: 63
End: 2023-12-28

## 2023-12-28 NOTE — TELEPHONE ENCOUNTER
Marla eduardo [address] [place] [address]  ([phone]) called the office to schedule the Botox delivery for the patient. This was discussed with Rose Marie Montes RN in the office. Call placed back to Fabiana Barrientos and delivery scheduled for January 3, 2024. Fabiana Barrientos stated that they will send the Botox as priority mail and we should receive it between 10:30 and 11:30 am.  Patient is scheduled with Dr. Jon Hanna on 1/9/24.

## 2024-01-09 ENCOUNTER — OFFICE VISIT (OUTPATIENT)
Dept: NEUROLOGY | Age: 64
End: 2024-01-09
Payer: COMMERCIAL

## 2024-01-09 DIAGNOSIS — G43.719 INTRACTABLE CHRONIC MIGRAINE WITHOUT AURA AND WITHOUT STATUS MIGRAINOSUS: Primary | ICD-10-CM

## 2024-01-09 PROCEDURE — 64615 CHEMODENERV MUSC MIGRAINE: CPT | Performed by: STUDENT IN AN ORGANIZED HEALTH CARE EDUCATION/TRAINING PROGRAM

## 2024-01-09 RX ORDER — ERENUMAB-AOOE 140 MG/ML
140 INJECTION, SOLUTION SUBCUTANEOUS
Qty: 2 ADJUSTABLE DOSE PRE-FILLED PEN SYRINGE | Refills: 3 | Status: SHIPPED | OUTPATIENT
Start: 2024-01-09

## 2024-01-09 NOTE — PROGRESS NOTES
[facility]  Offices: [address] [facility], [address], [place], [place] [address]  [address], [address], [place], [place] [id number] [facility], [place] [address]  Phone: [phone]  Fax: [phone]        Procedure: Chemodenervation CPT Code 48007  Indication for treatment: Chronic migraine (ICD 10 G43.709)  Consent form was signed.      Potential risks and benefits for the procedure were explained to the patient.      Procedure: 30-gauge half inch needle was used and 200 U vial Botox was prepared with 4ml 0.9% NS.155 units of Botox were used and 45 units of Botox were discarded.      Botox was injected at 31 different sites as follows:   Order  Muscle  Units injected    A  Corrugator1  10 units at 2 div sites    B  Procerus  5 Units in 1 site    C  Frontalis¹  20 Units div. 4 sites    D  Temporalis¹  40 Units div 8 site    E  Occipitalis¹  30 Units div. 6 sites    F  Cervical paraspinal muscles¹  20 Units div 4 sites    G  Trapezius¹  30 Units div 6 sites    Total Dose  155 Units div 31 sites    2 Dose distributed bilaterally     Blood loss: Less than 1 cc     Complications: none     Disposition: Post-botox instructions were reviewed and provided to the patient. Patient was advised to seek medical care for any generalized muscle weakness, double vision, ptosis, trouble swallowing, difficulty talking or difficulty breathing.  The patient will return in 3 months for subsequent botox treatments.

## 2024-01-17 RX ORDER — ERENUMAB-AOOE 140 MG/ML
140 INJECTION, SOLUTION SUBCUTANEOUS
Qty: 2 ADJUSTABLE DOSE PRE-FILLED PEN SYRINGE | Refills: 3 | Status: SHIPPED | OUTPATIENT
Start: 2024-01-17

## 2024-01-17 NOTE — TELEPHONE ENCOUNTER
A pharmacy change is needed as well as a new script; Please fill for Dr. Alarcon as she is not in the office;    Pharmacy requesting refill of Aimovig 140mg/mL.      Medication active on med list yes      Date of last Rx: 1/9/2024 with 3 refills          verified by BEVERLY BALL      Date of last appointment 1/9/2024    Next Visit Date:  4/10/2024

## 2024-01-19 ENCOUNTER — TELEPHONE (OUTPATIENT)
Dept: NEUROLOGY | Age: 64
End: 2024-01-19

## 2024-01-30 DIAGNOSIS — R21 RASH AND NONSPECIFIC SKIN ERUPTION: Primary | ICD-10-CM

## 2024-02-02 DIAGNOSIS — G43.719 INTRACTABLE CHRONIC MIGRAINE WITHOUT AURA AND WITHOUT STATUS MIGRAINOSUS: Primary | ICD-10-CM

## 2024-02-02 NOTE — TELEPHONE ENCOUNTER
Patient calling for refill of Qulipta.      Medication active on med list yes      Date of last fill: 10/11/23  verified on 2/2/2024   verified by SF LPN      Date of last appointment 1/9/24    Next Visit Date:  4/10/2024

## 2024-02-05 RX ORDER — ATOGEPANT 60 MG/1
60 TABLET ORAL DAILY
Qty: 30 TABLET | Refills: 3 | Status: SHIPPED | OUTPATIENT
Start: 2024-02-05 | End: 2025-01-28

## 2024-02-19 RX ORDER — FREMANEZUMAB-VFRM 225 MG/1.5ML
INJECTION SUBCUTANEOUS
Qty: 1.5 ML | OUTPATIENT
Start: 2024-02-19

## 2024-03-19 ENCOUNTER — TELEPHONE (OUTPATIENT)
Dept: NEUROLOGY | Age: 64
End: 2024-03-19

## 2024-03-19 NOTE — TELEPHONE ENCOUNTER
[facility] ([phone]) called this morning and left a message on the clinical VM that they were trying to schedule Botox delivery for the patient.  Writer spoke with Keysha ALFARO RN in our office, who stated that she was fine with any date.  Call placed back to the pharmacy and writer spoke with Hyacinth.  Botox will be shipped to our office on Tuesday, April 2, 2024.  Patient is scheduled for Botox with Dr. Alarcon on 4/11/24.

## 2024-03-22 ENCOUNTER — PATIENT MESSAGE (OUTPATIENT)
Dept: NEUROLOGY | Age: 64
End: 2024-03-22

## 2024-03-22 DIAGNOSIS — G43.719 INTRACTABLE CHRONIC MIGRAINE WITHOUT AURA AND WITHOUT STATUS MIGRAINOSUS: ICD-10-CM

## 2024-03-22 RX ORDER — RIZATRIPTAN BENZOATE 10 MG/1
TABLET ORAL
Qty: 12 TABLET | Refills: 2 | Status: SHIPPED | OUTPATIENT
Start: 2024-03-22

## 2024-03-22 NOTE — TELEPHONE ENCOUNTER
From: Giovanny Hendricks  To: Dr. Nyasia Alarcon  Sent: 3/22/2024 11:19 AM EDT  Subject: Rizatriptin Refill    Trying to get the \"rizatriptan\" refilled. I will going out of town all next week and wanted to be sure I had this before going.  Migraines have subsided the past week, but I don't want to take any chances on being out of town and without migraine medication.    Thank you

## 2024-03-22 NOTE — TELEPHONE ENCOUNTER
Patient requesting refill of Maxalt.      Medication active on med list yes      Date of last fill: 12/20/23  verified on 3/22/2024   verified by SF LPN      Date of last appointment 1/9/2024    Next Visit Date:  3/29/2024

## 2024-03-29 ENCOUNTER — OFFICE VISIT (OUTPATIENT)
Dept: NEUROLOGY | Age: 64
End: 2024-03-29
Payer: COMMERCIAL

## 2024-03-29 VITALS
WEIGHT: 230.2 LBS | HEIGHT: 74 IN | SYSTOLIC BLOOD PRESSURE: 136 MMHG | BODY MASS INDEX: 29.54 KG/M2 | HEART RATE: 71 BPM | DIASTOLIC BLOOD PRESSURE: 82 MMHG

## 2024-03-29 DIAGNOSIS — G43.719 INTRACTABLE CHRONIC MIGRAINE WITHOUT AURA AND WITHOUT STATUS MIGRAINOSUS: Primary | ICD-10-CM

## 2024-03-29 PROCEDURE — 3075F SYST BP GE 130 - 139MM HG: CPT | Performed by: STUDENT IN AN ORGANIZED HEALTH CARE EDUCATION/TRAINING PROGRAM

## 2024-03-29 PROCEDURE — 99214 OFFICE O/P EST MOD 30 MIN: CPT | Performed by: STUDENT IN AN ORGANIZED HEALTH CARE EDUCATION/TRAINING PROGRAM

## 2024-03-29 PROCEDURE — G2211 COMPLEX E/M VISIT ADD ON: HCPCS | Performed by: STUDENT IN AN ORGANIZED HEALTH CARE EDUCATION/TRAINING PROGRAM

## 2024-03-29 PROCEDURE — 3079F DIAST BP 80-89 MM HG: CPT | Performed by: STUDENT IN AN ORGANIZED HEALTH CARE EDUCATION/TRAINING PROGRAM

## 2024-03-29 NOTE — PROGRESS NOTES
[facility]            [address], [address]          [place], [place] [address]          Dept: [phone]          Dept Fax: [phone]             3/29/2024      HISTORY OF PRESENT ILLNESS:       I had the pleasure of seeing Giovanny Hendricks, who returns for treatment of intractable chronic migraine headaches.      For migraine prophylaxis, patient is on Botox every 90 days, Qulipta 60 daily, verapamil 120 mg daily. For abortive therapy he takes rizatriptan or sumatriptan  which is effective.  Patient stopped taking amitriptyline because he notes it made him drowsy.  Patient took his last Ajovy dose in January and has not been taking it since.    He got Botox on Jan 9th and notes he had a week of headaches after Botox. Patient notes that his headaches were well-controlled in February but then he had a week in March where he had headaches every day.  Patient notes that the headaches she gets respond well to rizatriptan or sumatriptan.  Patient would like to continue his current regimen.      Headache location: neck and back of the head  Headache quality: pressure  Associated factors:  nausea, cold sweat  Intensity: 10/10  Headache chronicity: 2 years  Headache frequency: daily  Aggravating factors : laying down, neck pain, bright lights  Relieving factors: Maxalt  Prophylactic medications:amitriptyline, Botox, nurtec QOD  Abortive medications: nurtec, Maxalt  Previously used medications: Topamax, Fioricet, nurtec, Aimovig, nurtec           Testing reviewed:   MRI Brain WO contrast (12/24/2020):  Impression   No acute intracranial abnormality.       Scattered foci of hyperintense signal mildly within the periventricular and   subcortical white matter of both cerebral hemispheres.  These changes likely   relate to either changes related to migraines versus chronic microvascular   disease                 PAST MEDICAL HISTORY:         Diagnosis Date    COVID-19 November

## 2024-04-11 ENCOUNTER — OFFICE VISIT (OUTPATIENT)
Dept: NEUROLOGY | Age: 64
End: 2024-04-11

## 2024-04-11 DIAGNOSIS — G43.719 INTRACTABLE CHRONIC MIGRAINE WITHOUT AURA AND WITHOUT STATUS MIGRAINOSUS: Primary | ICD-10-CM

## 2024-04-11 NOTE — PROGRESS NOTES
[facility]  Offices: [address], [address], [place], [place] [address]  [address], [address], [place], [place] [address] [address], [place] [address]  Phone: [phone]  Fax: [phone]        Procedure: Chemodenervation CPT Code 25829  Indication for treatment: Chronic migraine (ICD 10 G43.709)  Consent form was signed.      Potential risks and benefits for the procedure were explained to the patient.      Procedure: 30-gauge half inch needle was used and 200 U vial Botox was prepared with 4ml 0.9% NS.155 units of Botox were used and 45 units of Botox were discarded.      Botox was injected at 31 different sites as follows:   Order  Muscle  Units injected    A  Corrugator1  10 units at 2 div sites    B  Procerus  5 Units in 1 site    C  Frontalis¹  20 Units div. 4 sites    D  Temporalis¹  40 Units div 8 site    E  Occipitalis¹  30 Units div. 6 sites    F  Cervical paraspinal muscles¹  20 Units div 4 sites    G  Trapezius¹  30 Units div 6 sites    Total Dose  155 Units div 31 sites    2 Dose distributed bilaterally     Blood loss: Less than 1 cc     Complications: none     Disposition: Post-botox instructions were reviewed and provided to the patient. Patient was advised to seek medical care for any generalized muscle weakness, double vision, ptosis, trouble swallowing, difficulty talking or difficulty breathing.  The patient will return in 3 months for subsequent botox treatments.

## 2024-04-16 ASSESSMENT — PATIENT HEALTH QUESTIONNAIRE - PHQ9
SUM OF ALL RESPONSES TO PHQ9 QUESTIONS 1 & 2: 0
SUM OF ALL RESPONSES TO PHQ QUESTIONS 1-9: 0
SUM OF ALL RESPONSES TO PHQ QUESTIONS 1-9: 0
SUM OF ALL RESPONSES TO PHQ9 QUESTIONS 1 & 2: 0
SUM OF ALL RESPONSES TO PHQ QUESTIONS 1-9: 0
1. LITTLE INTEREST OR PLEASURE IN DOING THINGS: NOT AT ALL
SUM OF ALL RESPONSES TO PHQ QUESTIONS 1-9: 0
1. LITTLE INTEREST OR PLEASURE IN DOING THINGS: NOT AT ALL
2. FEELING DOWN, DEPRESSED OR HOPELESS: NOT AT ALL
2. FEELING DOWN, DEPRESSED OR HOPELESS: NOT AT ALL

## 2024-04-17 ENCOUNTER — OFFICE VISIT (OUTPATIENT)
Dept: FAMILY MEDICINE CLINIC | Age: 64
End: 2024-04-17

## 2024-04-17 VITALS
SYSTOLIC BLOOD PRESSURE: 144 MMHG | HEART RATE: 74 BPM | WEIGHT: 231 LBS | DIASTOLIC BLOOD PRESSURE: 90 MMHG | OXYGEN SATURATION: 99 % | BODY MASS INDEX: 29.66 KG/M2

## 2024-04-17 DIAGNOSIS — E78.5 DYSLIPIDEMIA: ICD-10-CM

## 2024-04-17 DIAGNOSIS — G43.719 INTRACTABLE CHRONIC MIGRAINE WITHOUT AURA AND WITHOUT STATUS MIGRAINOSUS: ICD-10-CM

## 2024-04-17 DIAGNOSIS — M54.50 RECURRENT LOW BACK PAIN: ICD-10-CM

## 2024-04-17 DIAGNOSIS — M25.562 CHRONIC KNEE PAIN AFTER TOTAL REPLACEMENT OF LEFT KNEE JOINT: ICD-10-CM

## 2024-04-17 DIAGNOSIS — M54.42 CHRONIC LEFT-SIDED LOW BACK PAIN WITH LEFT-SIDED SCIATICA: ICD-10-CM

## 2024-04-17 DIAGNOSIS — R73.01 IFG (IMPAIRED FASTING GLUCOSE): ICD-10-CM

## 2024-04-17 DIAGNOSIS — G89.29 CHRONIC KNEE PAIN AFTER TOTAL REPLACEMENT OF LEFT KNEE JOINT: ICD-10-CM

## 2024-04-17 DIAGNOSIS — Z96.652 CHRONIC KNEE PAIN AFTER TOTAL REPLACEMENT OF LEFT KNEE JOINT: ICD-10-CM

## 2024-04-17 DIAGNOSIS — I10 ESSENTIAL HYPERTENSION: Primary | ICD-10-CM

## 2024-04-17 DIAGNOSIS — M51.16 LUMBAR DISC DISEASE WITH RADICULOPATHY: ICD-10-CM

## 2024-04-17 DIAGNOSIS — Z12.5 PROSTATE CANCER SCREENING: ICD-10-CM

## 2024-04-17 DIAGNOSIS — G89.29 CHRONIC LEFT-SIDED LOW BACK PAIN WITH LEFT-SIDED SCIATICA: ICD-10-CM

## 2024-04-17 RX ORDER — OXYCODONE HYDROCHLORIDE AND ACETAMINOPHEN 5; 325 MG/1; MG/1
1 TABLET ORAL EVERY 6 HOURS PRN
Qty: 28 TABLET | Refills: 0 | Status: SHIPPED | OUTPATIENT
Start: 2024-04-17 | End: 2024-04-24

## 2024-04-17 RX ORDER — PREDNISONE 20 MG/1
20 TABLET ORAL 2 TIMES DAILY
Qty: 10 TABLET | Refills: 0 | Status: SHIPPED | OUTPATIENT
Start: 2024-04-18 | End: 2024-04-23

## 2024-04-17 RX ORDER — METHYLPREDNISOLONE SODIUM SUCCINATE 125 MG/2ML
60 INJECTION, POWDER, LYOPHILIZED, FOR SOLUTION INTRAMUSCULAR; INTRAVENOUS ONCE
Status: COMPLETED | OUTPATIENT
Start: 2024-04-17 | End: 2024-04-17

## 2024-04-17 RX ORDER — LISINOPRIL 40 MG/1
40 TABLET ORAL DAILY
Qty: 90 TABLET | Refills: 1 | Status: SHIPPED | OUTPATIENT
Start: 2024-04-17

## 2024-04-17 RX ORDER — KETOROLAC TROMETHAMINE 30 MG/ML
60 INJECTION, SOLUTION INTRAMUSCULAR; INTRAVENOUS ONCE
Status: COMPLETED | OUTPATIENT
Start: 2024-04-17 | End: 2024-04-17

## 2024-04-17 RX ADMIN — METHYLPREDNISOLONE SODIUM SUCCINATE 60 MG: 125 INJECTION, POWDER, LYOPHILIZED, FOR SOLUTION INTRAMUSCULAR; INTRAVENOUS at 12:14

## 2024-04-17 RX ADMIN — KETOROLAC TROMETHAMINE 60 MG: 30 INJECTION, SOLUTION INTRAMUSCULAR; INTRAVENOUS at 12:12

## 2024-04-17 ASSESSMENT — ENCOUNTER SYMPTOMS
BACK PAIN: 1
SHORTNESS OF BREATH: 0

## 2024-04-17 NOTE — PROGRESS NOTES
LEFT (MIN 3 VIEWS)  Narrative: 8/28/2023 FINDINGS:  Three weightbearing views (AP, Mortise, and Lateral)   of the left ankle were obtained in the office today and reviewed,   revealing no acute fracture, dislocation, or radioopaque foreign   body/tumor. Degenerative changes of tibiotalar joint and subtalar joint   with joint narrowing, sclerosis, and osteophytes.  Retained hardware noted   in the distal tibia in both medially and laterally.  No significant   interval change.      Impression:   No acute fracture/dislocation. Degenerative changes with   retained hardware as above.      Electronically signed by Rafa Rojas MD

## 2024-04-23 ENCOUNTER — HOSPITAL ENCOUNTER (OUTPATIENT)
Age: 64
Setting detail: SPECIMEN
Discharge: HOME | End: 2024-04-23

## 2024-04-23 DIAGNOSIS — E78.5 DYSLIPIDEMIA: ICD-10-CM

## 2024-04-23 DIAGNOSIS — Z12.5 PROSTATE CANCER SCREENING: ICD-10-CM

## 2024-04-23 DIAGNOSIS — I10 ESSENTIAL HYPERTENSION: ICD-10-CM

## 2024-04-23 DIAGNOSIS — R73.01 IFG (IMPAIRED FASTING GLUCOSE): ICD-10-CM

## 2024-04-23 DIAGNOSIS — G43.719 INTRACTABLE CHRONIC MIGRAINE WITHOUT AURA AND WITHOUT STATUS MIGRAINOSUS: ICD-10-CM

## 2024-04-23 LAB
ALBUMIN SERPL-MCNC: 4.5 G/DL (ref 3.5–5.2)
ALBUMIN/GLOB SERPL: 2 {RATIO} (ref 1–2.5)
ALP SERPL-CCNC: 72 U/L (ref 40–129)
ALT SERPL-CCNC: 17 U/L (ref 10–50)
ANION GAP SERPL CALCULATED.3IONS-SCNC: 11 MMOL/L (ref 9–16)
AST SERPL-CCNC: 18 U/L (ref 10–50)
BASOPHILS # BLD: 0 K/UL (ref 0–0.2)
BASOPHILS NFR BLD: 0 % (ref 0–2)
BILIRUB SERPL-MCNC: 0.4 MG/DL (ref 0–1.2)
BUN SERPL-MCNC: 13 MG/DL (ref 8–23)
CALCIUM SERPL-MCNC: 9.6 MG/DL (ref 8.6–10.4)
CHLORIDE SERPL-SCNC: 104 MMOL/L (ref 98–107)
CHOLEST SERPL-MCNC: 199 MG/DL (ref 0–199)
CHOLESTEROL/HDL RATIO: 3
CO2 SERPL-SCNC: 27 MMOL/L (ref 20–31)
CREAT SERPL-MCNC: 0.9 MG/DL (ref 0.7–1.2)
EOSINOPHIL # BLD: 0 K/UL (ref 0–0.4)
EOSINOPHILS RELATIVE PERCENT: 0 % (ref 1–4)
ERYTHROCYTE [DISTWIDTH] IN BLOOD BY AUTOMATED COUNT: 13 % (ref 11.8–14.4)
EST. AVERAGE GLUCOSE BLD GHB EST-MCNC: 103 MG/DL
GFR SERPL CREATININE-BSD FRML MDRD: >90 ML/MIN/1.73M2
GLUCOSE SERPL-MCNC: 105 MG/DL (ref 74–99)
HBA1C MFR BLD: 5.2 % (ref 4–6)
HCT VFR BLD AUTO: 47.6 % (ref 40.7–50.3)
HDLC SERPL-MCNC: 66 MG/DL
HGB BLD-MCNC: 15.3 G/DL (ref 13–17)
IMM GRANULOCYTES # BLD AUTO: 0 K/UL (ref 0–0.3)
IMM GRANULOCYTES NFR BLD: 0 %
LDLC SERPL CALC-MCNC: 120 MG/DL (ref 0–100)
LYMPHOCYTES NFR BLD: 0.58 K/UL (ref 1–4.8)
LYMPHOCYTES RELATIVE PERCENT: 9 % (ref 24–44)
MCH RBC QN AUTO: 29.9 PG (ref 25.2–33.5)
MCHC RBC AUTO-ENTMCNC: 32.1 G/DL (ref 28.4–34.8)
MCV RBC AUTO: 93 FL (ref 82.6–102.9)
MONOCYTES NFR BLD: 0.32 K/UL (ref 0.1–0.8)
MONOCYTES NFR BLD: 5 % (ref 1–7)
MORPHOLOGY: NORMAL
NEUTROPHILS NFR BLD: 86 % (ref 36–66)
NEUTS SEG NFR BLD: 5.5 K/UL (ref 1.8–7.7)
NRBC BLD-RTO: 0 PER 100 WBC
PLATELET # BLD AUTO: 206 K/UL (ref 138–453)
PMV BLD AUTO: 10 FL (ref 8.1–13.5)
POTASSIUM SERPL-SCNC: 4.4 MMOL/L (ref 3.7–5.3)
PROT SERPL-MCNC: 6.7 G/DL (ref 6.6–8.7)
PSA SERPL-MCNC: 2.4 NG/ML (ref 0–4)
RBC # BLD AUTO: 5.12 M/UL (ref 4.21–5.77)
SODIUM SERPL-SCNC: 142 MMOL/L (ref 136–145)
TRIGL SERPL-MCNC: 64 MG/DL
VLDLC SERPL CALC-MCNC: 13 MG/DL
WBC OTHER # BLD: 6.4 K/UL (ref 3.5–11.3)

## 2024-05-13 DIAGNOSIS — Z96.652 HISTORY OF TOTAL LEFT KNEE REPLACEMENT: Primary | ICD-10-CM

## 2024-05-16 ENCOUNTER — OFFICE VISIT (OUTPATIENT)
Dept: ORTHOPEDIC SURGERY | Age: 64
End: 2024-05-16
Payer: COMMERCIAL

## 2024-05-16 VITALS — BODY MASS INDEX: 28.75 KG/M2 | HEIGHT: 74 IN | WEIGHT: 224 LBS | RESPIRATION RATE: 14 BRPM

## 2024-05-16 DIAGNOSIS — Z96.652 S/P TOTAL KNEE ARTHROPLASTY, LEFT: Primary | ICD-10-CM

## 2024-05-16 DIAGNOSIS — Z96.9 RETAINED ORTHOPEDIC HARDWARE: ICD-10-CM

## 2024-05-16 PROCEDURE — 99214 OFFICE O/P EST MOD 30 MIN: CPT | Performed by: ORTHOPAEDIC SURGERY

## 2024-05-17 ENCOUNTER — HOSPITAL ENCOUNTER (OUTPATIENT)
Age: 64
Discharge: HOME | End: 2024-05-17
Payer: COMMERCIAL

## 2024-05-17 DIAGNOSIS — Z96.652 S/P TOTAL KNEE ARTHROPLASTY, LEFT: ICD-10-CM

## 2024-05-17 DIAGNOSIS — Z96.9 RETAINED ORTHOPEDIC HARDWARE: ICD-10-CM

## 2024-05-17 LAB
ALBUMIN SERPL-MCNC: 4.4 G/DL (ref 3.5–5.2)
ALBUMIN/GLOB SERPL: 2 {RATIO} (ref 1–2.5)
ALP SERPL-CCNC: 83 U/L (ref 40–129)
ALT SERPL-CCNC: 25 U/L (ref 10–50)
ANION GAP SERPL CALCULATED.3IONS-SCNC: 9 MMOL/L (ref 9–16)
AST SERPL-CCNC: 28 U/L (ref 10–50)
BASOPHILS # BLD: 0.05 K/UL (ref 0–0.2)
BASOPHILS NFR BLD: 1 % (ref 0–2)
BILIRUB SERPL-MCNC: 0.7 MG/DL (ref 0–1.2)
BUN SERPL-MCNC: 9 MG/DL (ref 8–23)
CALCIUM SERPL-MCNC: 9.5 MG/DL (ref 8.6–10.4)
CHLORIDE SERPL-SCNC: 104 MMOL/L (ref 98–107)
CO2 SERPL-SCNC: 28 MMOL/L (ref 20–31)
CREAT SERPL-MCNC: 1 MG/DL (ref 0.7–1.2)
CRP SERPL HS-MCNC: <3 MG/L (ref 0–5)
EOSINOPHIL # BLD: 0.5 K/UL (ref 0–0.44)
EOSINOPHILS RELATIVE PERCENT: 10 % (ref 1–4)
ERYTHROCYTE [DISTWIDTH] IN BLOOD BY AUTOMATED COUNT: 12.6 % (ref 11.8–14.4)
ERYTHROCYTE [SEDIMENTATION RATE] IN BLOOD BY PHOTOMETRIC METHOD: 7 MM/HR (ref 0–20)
GFR, ESTIMATED: 87 ML/MIN/1.73M2
GLUCOSE SERPL-MCNC: 98 MG/DL (ref 74–99)
HCT VFR BLD AUTO: 48.6 % (ref 40.7–50.3)
HGB BLD-MCNC: 15.9 G/DL (ref 13–17)
IMM GRANULOCYTES # BLD AUTO: <0.03 K/UL (ref 0–0.3)
IMM GRANULOCYTES NFR BLD: 0 %
LYMPHOCYTES NFR BLD: 1.52 K/UL (ref 1.1–3.7)
LYMPHOCYTES RELATIVE PERCENT: 31 % (ref 24–43)
MCH RBC QN AUTO: 29.5 PG (ref 25.2–33.5)
MCHC RBC AUTO-ENTMCNC: 32.7 G/DL (ref 28.4–34.8)
MCV RBC AUTO: 90.2 FL (ref 82.6–102.9)
MONOCYTES NFR BLD: 0.55 K/UL (ref 0.1–1.2)
MONOCYTES NFR BLD: 11 % (ref 3–12)
NEUTROPHILS NFR BLD: 47 % (ref 36–65)
NEUTS SEG NFR BLD: 2.22 K/UL (ref 1.5–8.1)
NRBC BLD-RTO: 0 PER 100 WBC
PLATELET # BLD AUTO: 199 K/UL (ref 138–453)
PMV BLD AUTO: 9.5 FL (ref 8.1–13.5)
POTASSIUM SERPL-SCNC: 4 MMOL/L (ref 3.7–5.3)
PROT SERPL-MCNC: 7.1 G/DL (ref 6.6–8.7)
RBC # BLD AUTO: 5.39 M/UL (ref 4.21–5.77)
SODIUM SERPL-SCNC: 141 MMOL/L (ref 136–145)
WBC OTHER # BLD: 4.9 K/UL (ref 3.5–11.3)

## 2024-05-17 PROCEDURE — 80053 COMPREHEN METABOLIC PANEL: CPT

## 2024-05-17 PROCEDURE — 85025 COMPLETE CBC W/AUTO DIFF WBC: CPT

## 2024-05-17 PROCEDURE — 85652 RBC SED RATE AUTOMATED: CPT

## 2024-05-17 PROCEDURE — 36415 COLL VENOUS BLD VENIPUNCTURE: CPT

## 2024-05-17 PROCEDURE — 86140 C-REACTIVE PROTEIN: CPT

## 2024-05-20 ASSESSMENT — ENCOUNTER SYMPTOMS
ROS SKIN COMMENTS: NEGATIVE FOR RASH
EYE DISCHARGE: 0
ABDOMINAL PAIN: 0
SHORTNESS OF BREATH: 0

## 2024-05-20 NOTE — PROGRESS NOTES
plan as necessary at that time.     Follow up:No follow-ups on file.    No orders of the defined types were placed in this encounter.        Orders Placed This Encounter   Procedures    CT KNEE LEFT WO CONTRAST     With MARS     Standing Status:   Future     Standing Expiration Date:   5/16/2025    Sedimentation Rate     Standing Status:   Future     Number of Occurrences:   1     Standing Expiration Date:   5/16/2025    Comprehensive Metabolic Panel     Standing Status:   Future     Number of Occurrences:   1     Standing Expiration Date:   5/16/2025    C-Reactive Protein     Standing Status:   Future     Number of Occurrences:   1     Standing Expiration Date:   5/16/2025    CBC with Auto Differential     Standing Status:   Future     Number of Occurrences:   1     Standing Expiration Date:   5/16/2025       This note is created with the assistance of a speech recognition program.  While intending to generate a document that actually reflects the content of the visit, the document can still have some errors including those of syntax and sound a like substitutions which may escape proof reading.  In such instances, actual meaning can be extrapolated by contextual diversion      Electronically signed by Rizwan Adkins DO, FAOAO on 5/20/2024 at 6:28 PM

## 2024-06-03 ENCOUNTER — HOSPITAL ENCOUNTER (OUTPATIENT)
Dept: CT IMAGING | Age: 64
Discharge: HOME | End: 2024-06-05
Attending: ORTHOPAEDIC SURGERY
Payer: COMMERCIAL

## 2024-06-03 DIAGNOSIS — Z96.652 S/P TOTAL KNEE ARTHROPLASTY, LEFT: ICD-10-CM

## 2024-06-03 DIAGNOSIS — Z96.9 RETAINED ORTHOPEDIC HARDWARE: ICD-10-CM

## 2024-06-03 PROCEDURE — 73700 CT LOWER EXTREMITY W/O DYE: CPT

## 2024-06-04 DIAGNOSIS — G43.719 INTRACTABLE CHRONIC MIGRAINE WITHOUT AURA AND WITHOUT STATUS MIGRAINOSUS: ICD-10-CM

## 2024-06-04 RX ORDER — ATOGEPANT 60 MG/1
1 TABLET ORAL DAILY
Qty: 30 TABLET | Refills: 5 | Status: SHIPPED | OUTPATIENT
Start: 2024-06-04

## 2024-06-04 NOTE — TELEPHONE ENCOUNTER
Pharmacy requesting refill of Qulipta 60mg.      Medication active on med list yes      Date of last Rx: 2/5/2024 with 3 refills          verified by BEVERLY BALL      Date of last appointment 4/11/2024    Next Visit Date:  7/10/2024

## 2024-06-10 ENCOUNTER — TELEPHONE (OUTPATIENT)
Dept: NEUROLOGY | Age: 64
End: 2024-06-10

## 2024-06-10 NOTE — TELEPHONE ENCOUNTER
Giovanny called the office regarding issues with Botox billing.  Patient states that two of the four dates were resolved.  Writer advised that I would forward this information on to my .  Patient verbally stated his understanding.  Writer gave this information to  Yara. Call placed back to Giovanny and message left on his VM asking for a return call.

## 2024-06-11 ENCOUNTER — TELEPHONE (OUTPATIENT)
Dept: ORTHOPEDIC SURGERY | Age: 64
End: 2024-06-11

## 2024-06-11 DIAGNOSIS — G43.719 INTRACTABLE CHRONIC MIGRAINE WITHOUT AURA AND WITHOUT STATUS MIGRAINOSUS: ICD-10-CM

## 2024-06-11 RX ORDER — ATOGEPANT 60 MG/1
1 TABLET ORAL DAILY
Qty: 30 TABLET | Refills: 5 | OUTPATIENT
Start: 2024-06-11

## 2024-06-11 NOTE — TELEPHONE ENCOUNTER
I called the patient in regards to his left knee CT scan results.  There is no evidence of periprosthetic lucency or osseous abnormality noted around his left total knee arthroplasty components.  He does have a small joint effusion and heterotopic ossification noted along the distal quadriceps and proximal patellar tendon.    Patient has been continuing home exercises and has not done formal physical therapy for his left knee.  Of note the patient did have left total knee arthroplasty completed in 2009 with Dr. Duane Edmond.    A follow-up appointment with Dr. Adkins was made for 6/27/2024 at 8:30 AM at our [place] location.  Patient may continue activity as tolerated on the left lower extremity and will follow-up at that appointment.  All questions were answered.

## 2024-06-27 ENCOUNTER — OFFICE VISIT (OUTPATIENT)
Dept: ORTHOPEDIC SURGERY | Age: 64
End: 2024-06-27

## 2024-06-27 VITALS — BODY MASS INDEX: 28.23 KG/M2 | HEIGHT: 74 IN | RESPIRATION RATE: 14 BRPM | WEIGHT: 220 LBS

## 2024-06-27 DIAGNOSIS — Z96.652 S/P TOTAL KNEE ARTHROPLASTY, LEFT: ICD-10-CM

## 2024-06-27 DIAGNOSIS — M25.572 CHRONIC PAIN OF LEFT ANKLE: ICD-10-CM

## 2024-06-27 DIAGNOSIS — Z96.652 HISTORY OF TOTAL LEFT KNEE REPLACEMENT: Primary | ICD-10-CM

## 2024-06-27 DIAGNOSIS — G89.29 CHRONIC PAIN OF LEFT ANKLE: ICD-10-CM

## 2024-06-29 ASSESSMENT — ENCOUNTER SYMPTOMS
ABDOMINAL PAIN: 0
ROS SKIN COMMENTS: NEGATIVE FOR RASH
EYE DISCHARGE: 0
SHORTNESS OF BREATH: 0

## 2024-06-29 NOTE — PROGRESS NOTES
effusion. Tiny loose bodies measuring up to 6 mm in the suprapatellar joint space on image 41, series 602. Prior left knee arthroplasty and patellar resurfacing.     1. Prior left knee arthroplasty and patellar resurfacing.  No significant periprosthetic lucency or acute osseous abnormality. 2. Small joint effusion.  Loose bodies in the suprapatellar joint space measuring up to 6 mm. 3. Heterotopic ossification and calcific densities along the distal quadriceps tendon and patellar tendon.        Assessment:      1. History of total left knee replacement    2. S/P total knee arthroplasty, left    3. Chronic pain of left ankle       Plan:   Assessment & Plan   Labs were reviewed today and do not indicate any concerns for an infection.  CT scan also look good.  As result with the patient having recurrent effusions and persistent pain especially over the last year allowing him not to be able to golf or do usual activities of daily living, the patient would like to consider revision total knee arthroplasty.  We talked about frozen sections being sent off at the time of surgery and a potential, albeit small, of doing a two-stage procedure with a spacer.  He notes understanding and wishes to proceed.    All details of the procedure, as well as risks, benefits and alternatives, including the option of non operative versus operative treatment were discussed.  The patient understands that risks of the surgery include but are not limited to: bleeding, malunion/nonunion, loss of fixation, loss of reduction, hardware failure, angular or rotational deformity, length discrepancy, limp, transfusion, skin blistering or breakdown, progressive post traumatic degenerative joint disease, possible need for further surgery, bone grafting, infection, nerve injury, paralysis, numbness, blood vessel injury, excessive scaring, wound complication or breakdown, failure of symptoms to improve or actual deterioration in condition, significant

## 2024-07-02 ENCOUNTER — TELEPHONE (OUTPATIENT)
Dept: NEUROLOGY | Age: 64
End: 2024-07-02

## 2024-07-02 NOTE — TELEPHONE ENCOUNTER
I received a call from Shari with [facility]  billing department. She verified with me that DOS 1/9/24 and 4/11/24 were billed [id number] for the [id number].  In the MAR the MA did not pam that pt. supplied the medication.   For both dates of service the patient supplied the Botox via [facility]. It was not to be buy and bill.  Shari can be reached by calling [phone] and leave a message. I discussed this with Yara SPANGLER our  and she advised it is okay for me to verify that it was patient did supplied.

## 2024-07-09 DIAGNOSIS — I10 ESSENTIAL HYPERTENSION: ICD-10-CM

## 2024-07-09 RX ORDER — LISINOPRIL 40 MG/1
40 TABLET ORAL DAILY
Qty: 90 TABLET | Refills: 1 | Status: SHIPPED | OUTPATIENT
Start: 2024-07-09

## 2024-07-09 NOTE — TELEPHONE ENCOUNTER
Giovanny Hendricks is calling to request a refill on the following medication(s):    Last Visit Date (If Applicable):  4/17/2024    Next Visit Date:    10/17/2024    Medication Request:  Requested Prescriptions     Pending Prescriptions Disp Refills    lisinopril (PRINIVIL;ZESTRIL) 40 MG tablet 90 tablet 1     Sig: Take 1 tablet by mouth daily

## 2024-07-10 ENCOUNTER — TELEPHONE (OUTPATIENT)
Dept: ORTHOPEDIC SURGERY | Age: 64
End: 2024-07-10

## 2024-07-10 DIAGNOSIS — Z01.818 PRE-OP TESTING: Primary | ICD-10-CM

## 2024-07-12 DIAGNOSIS — G43.719 INTRACTABLE CHRONIC MIGRAINE WITHOUT AURA AND WITHOUT STATUS MIGRAINOSUS: ICD-10-CM

## 2024-07-12 RX ORDER — ATOGEPANT 60 MG/1
1 TABLET ORAL DAILY
Qty: 30 TABLET | Refills: 5 | OUTPATIENT
Start: 2024-07-12

## 2024-07-12 NOTE — TELEPHONE ENCOUNTER
Pharmacy requesting refill of QULIPTA 60 MG TABS      Medication active on med list yes      Date of last Rx: 6/4/2024 with 5 refills          verified by SANA BORDEN      Date of last appointment 3/29/2024    Next Visit Date:  Visit date not found    Please resend as [facility] has closed. Thank you.

## 2024-07-15 RX ORDER — ATOGEPANT 60 MG/1
1 TABLET ORAL DAILY
Qty: 30 TABLET | Refills: 5 | Status: SHIPPED | OUTPATIENT
Start: 2024-07-15

## 2024-08-01 ENCOUNTER — OFFICE VISIT (OUTPATIENT)
Dept: NEUROLOGY | Age: 64
End: 2024-08-01
Payer: COMMERCIAL

## 2024-08-01 DIAGNOSIS — G43.719 INTRACTABLE CHRONIC MIGRAINE WITHOUT AURA AND WITHOUT STATUS MIGRAINOSUS: Primary | ICD-10-CM

## 2024-08-01 PROCEDURE — 64615 CHEMODENERV MUSC MIGRAINE: CPT | Performed by: PSYCHIATRY & NEUROLOGY

## 2024-08-12 NOTE — PROGRESS NOTES
[facility] [place] [address], [address]  [place] [address]  Ph: [phone] or [phone]  FAX: [phone]          Indication for treatment: Chronic migraine without aura, intractable, with status migrainosus (G 43.718)  Consent form was signed. Please see the associated scanned paper.   Potential risks and benefits for the procedure were explained to the patient.   Procedure: 30-gauge half inch needle was used and 200 U vial Botox was prepared with 4ml 0.9% NS.155 units of Botox were used and 45 units of Botox were discarded.   Botox was injected at 31 different sites as follows:     Order  Muscle  Units injected    A  Corrugator1  10 units at 2 div sites    B  Procerus  5 Units in 1 site    C  Frontalis¹  20 Units div. 4 sites    D  Temporalis¹  40 Units div 8 site    E  Occipitalis¹  30 Units div. 6 sites    F  Cervical paraspinal muscles¹  20 Units div 4 sites    G  Trapezius¹  30 Units div 6 sites    Total Dose  155 Units div 31 sites    1 Dose distributed bilaterally  Blood loss: Less than 1 cc  BOTOX Units used: 155 Units  BOTOX units discarded: 45 Units   Complications: none

## 2024-08-20 ENCOUNTER — TELEPHONE (OUTPATIENT)
Dept: NEUROLOGY | Age: 64
End: 2024-08-20

## 2024-08-21 NOTE — TELEPHONE ENCOUNTER
PA for Qulipta was approved through August 21, 2024 to August 21, 2025      Attempted to notify patient; a message was left with this information as well as the office phone number in the event there are further questions.

## 2024-09-10 ENCOUNTER — TELEPHONE (OUTPATIENT)
Dept: FAMILY MEDICINE CLINIC | Age: 64
End: 2024-09-10

## 2024-09-13 ENCOUNTER — OFFICE VISIT (OUTPATIENT)
Dept: FAMILY MEDICINE CLINIC | Age: 64
End: 2024-09-13

## 2024-09-13 VITALS
SYSTOLIC BLOOD PRESSURE: 142 MMHG | OXYGEN SATURATION: 97 % | WEIGHT: 221 LBS | BODY MASS INDEX: 28.36 KG/M2 | DIASTOLIC BLOOD PRESSURE: 82 MMHG | HEART RATE: 74 BPM

## 2024-09-13 DIAGNOSIS — M54.42 ACUTE LEFT-SIDED LOW BACK PAIN WITH LEFT-SIDED SCIATICA: Primary | ICD-10-CM

## 2024-09-13 DIAGNOSIS — M51.16 LUMBAR DISC DISEASE WITH RADICULOPATHY: ICD-10-CM

## 2024-09-13 RX ORDER — KETOROLAC TROMETHAMINE 30 MG/ML
60 INJECTION, SOLUTION INTRAMUSCULAR; INTRAVENOUS ONCE
Status: COMPLETED | OUTPATIENT
Start: 2024-09-13 | End: 2024-09-13

## 2024-09-13 RX ORDER — OXYCODONE AND ACETAMINOPHEN 5; 325 MG/1; MG/1
1 TABLET ORAL EVERY 6 HOURS PRN
Qty: 28 TABLET | Refills: 0 | Status: SHIPPED | OUTPATIENT
Start: 2024-09-13 | End: 2024-09-20

## 2024-09-13 RX ORDER — PREDNISONE 20 MG/1
20 TABLET ORAL 2 TIMES DAILY
Qty: 10 TABLET | Refills: 0 | Status: SHIPPED | OUTPATIENT
Start: 2024-09-14 | End: 2024-09-19

## 2024-09-13 RX ORDER — METHYLPREDNISOLONE SODIUM SUCCINATE 125 MG/2ML
60 INJECTION, POWDER, LYOPHILIZED, FOR SOLUTION INTRAMUSCULAR; INTRAVENOUS ONCE
Status: COMPLETED | OUTPATIENT
Start: 2024-09-13 | End: 2024-09-13

## 2024-09-13 RX ORDER — RIZATRIPTAN BENZOATE 10 MG/1
TABLET ORAL
COMMUNITY
Start: 2024-08-18

## 2024-09-13 RX ADMIN — KETOROLAC TROMETHAMINE 60 MG: 30 INJECTION, SOLUTION INTRAMUSCULAR; INTRAVENOUS at 07:38

## 2024-09-13 RX ADMIN — METHYLPREDNISOLONE SODIUM SUCCINATE 60 MG: 125 INJECTION, POWDER, LYOPHILIZED, FOR SOLUTION INTRAMUSCULAR; INTRAVENOUS at 07:39

## 2024-09-13 ASSESSMENT — ENCOUNTER SYMPTOMS
ABDOMINAL PAIN: 0
BACK PAIN: 1
BOWEL INCONTINENCE: 0

## 2024-09-30 RX ORDER — RIZATRIPTAN BENZOATE 10 MG/1
10 TABLET ORAL
Qty: 12 TABLET | Refills: 2 | Status: SHIPPED | OUTPATIENT
Start: 2024-09-30 | End: 2024-09-30

## 2024-09-30 NOTE — TELEPHONE ENCOUNTER
Pharmacy requesting refill of Rizatriptan 10 mg tablets.    Medication active on med list yes    Date of last Rx: 3/22/24 #12 with 2 refills   verified by MINAL Nogueira    Date of last appointment 3/29/24 with Dr. Alarcon. Patient saw Dr. Quesada on 8/1/24 for his botox.    Next Visit Date:  10/31/2024 for botox with Dr. Quesada.

## 2024-10-10 ENCOUNTER — ANESTHESIA EVENT (OUTPATIENT)
Dept: OPERATING ROOM | Age: 64
End: 2024-10-10
Payer: COMMERCIAL

## 2024-10-10 ENCOUNTER — HOSPITAL ENCOUNTER (EMERGENCY)
Age: 64
Discharge: HOME | End: 2024-10-10
Attending: EMERGENCY MEDICINE
Payer: COMMERCIAL

## 2024-10-10 ENCOUNTER — ANESTHESIA (OUTPATIENT)
Dept: OPERATING ROOM | Age: 64
End: 2024-10-10
Payer: COMMERCIAL

## 2024-10-10 VITALS
DIASTOLIC BLOOD PRESSURE: 101 MMHG | BODY MASS INDEX: 28.23 KG/M2 | HEIGHT: 74 IN | RESPIRATION RATE: 10 BRPM | HEART RATE: 78 BPM | TEMPERATURE: 97.4 F | WEIGHT: 220 LBS | OXYGEN SATURATION: 94 % | SYSTOLIC BLOOD PRESSURE: 131 MMHG

## 2024-10-10 DIAGNOSIS — T18.128A FOOD IMPACTION OF ESOPHAGUS, INITIAL ENCOUNTER: ICD-10-CM
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
## 2024-10-10 DIAGNOSIS — T18.108A FOREIGN BODY IN ESOPHAGUS, INITIAL ENCOUNTER: Primary | ICD-10-CM

## 2024-10-10 PROBLEM — K25.9 GASTRIC ULCER: Status: ACTIVE | Noted: 2024-10-10

## 2024-10-10 PROBLEM — K22.2 ESOPHAGEAL STRICTURE: Status: ACTIVE | Noted: 2024-10-10

## 2024-10-10 PROBLEM — K29.60 EROSIVE GASTRITIS: Status: ACTIVE | Noted: 2024-10-10

## 2024-10-10 LAB
ANION GAP SERPL CALCULATED.3IONS-SCNC: 12 MMOL/L (ref 9–17)
BASOPHILS # BLD: 0 K/UL (ref 0–0.2)
BASOPHILS NFR BLD: 1 % (ref 0–2)
BUN SERPL-MCNC: 13 MG/DL (ref 8–23)
CALCIUM SERPL-MCNC: 9.9 MG/DL (ref 8.6–10.4)
CHLORIDE SERPL-SCNC: 104 MMOL/L (ref 98–107)
CO2 SERPL-SCNC: 25 MMOL/L (ref 20–31)
CREAT SERPL-MCNC: 0.9 MG/DL (ref 0.7–1.2)
EOSINOPHIL # BLD: 0.3 K/UL (ref 0–0.4)
EOSINOPHILS RELATIVE PERCENT: 5 % (ref 1–4)
ERYTHROCYTE [DISTWIDTH] IN BLOOD BY AUTOMATED COUNT: 13.2 % (ref 12.5–15.4)
GFR, ESTIMATED: >90 ML/MIN/1.73M2
GLUCOSE SERPL-MCNC: 101 MG/DL (ref 70–99)
HCT VFR BLD AUTO: 47.6 % (ref 41–53)
HGB BLD-MCNC: 16.2 G/DL (ref 13.5–17.5)
LYMPHOCYTES NFR BLD: 1.4 K/UL (ref 1–4.8)
LYMPHOCYTES RELATIVE PERCENT: 22 % (ref 24–44)
MCH RBC QN AUTO: 30.5 PG (ref 26–34)
MCHC RBC AUTO-ENTMCNC: 33.9 G/DL (ref 31–37)
MCV RBC AUTO: 90 FL (ref 80–100)
MONOCYTES NFR BLD: 0.6 K/UL (ref 0.1–1.2)
MONOCYTES NFR BLD: 9 % (ref 2–11)
NEUTROPHILS NFR BLD: 63 % (ref 36–66)
NEUTS SEG NFR BLD: 4.2 K/UL (ref 1.8–7.7)
PLATELET # BLD AUTO: 212 K/UL (ref 140–450)
PMV BLD AUTO: 7.9 FL (ref 6–12)
POTASSIUM SERPL-SCNC: 4.3 MMOL/L (ref 3.7–5.3)
RBC # BLD AUTO: 5.29 M/UL (ref 4.5–5.9)
SODIUM SERPL-SCNC: 141 MMOL/L (ref 135–144)
WBC OTHER # BLD: 6.6 K/UL (ref 3.5–11)

## 2024-10-10 PROCEDURE — 3700000000 HC ANESTHESIA ATTENDED CARE: Performed by: STUDENT IN AN ORGANIZED HEALTH CARE EDUCATION/TRAINING PROGRAM

## 2024-10-10 PROCEDURE — 7100000000 HC PACU RECOVERY - FIRST 15 MIN: Performed by: STUDENT IN AN ORGANIZED HEALTH CARE EDUCATION/TRAINING PROGRAM

## 2024-10-10 PROCEDURE — 43247 EGD REMOVE FOREIGN BODY: CPT | Performed by: STUDENT IN AN ORGANIZED HEALTH CARE EDUCATION/TRAINING PROGRAM

## 2024-10-10 PROCEDURE — 99285 EMERGENCY DEPT VISIT HI MDM: CPT

## 2024-10-10 PROCEDURE — 80048 BASIC METABOLIC PNL TOTAL CA: CPT

## 2024-10-10 PROCEDURE — 6360000002 HC RX W HCPCS: Performed by: ANESTHESIOLOGY

## 2024-10-10 PROCEDURE — 7100000011 HC PHASE II RECOVERY - ADDTL 15 MIN: Performed by: STUDENT IN AN ORGANIZED HEALTH CARE EDUCATION/TRAINING PROGRAM

## 2024-10-10 PROCEDURE — 96375 TX/PRO/DX INJ NEW DRUG ADDON: CPT

## 2024-10-10 PROCEDURE — 88342 IMHCHEM/IMCYTCHM 1ST ANTB: CPT

## 2024-10-10 PROCEDURE — 99222 1ST HOSP IP/OBS MODERATE 55: CPT | Performed by: STUDENT IN AN ORGANIZED HEALTH CARE EDUCATION/TRAINING PROGRAM

## 2024-10-10 PROCEDURE — 2580000003 HC RX 258: Performed by: ANESTHESIOLOGY

## 2024-10-10 PROCEDURE — 43239 EGD BIOPSY SINGLE/MULTIPLE: CPT | Performed by: STUDENT IN AN ORGANIZED HEALTH CARE EDUCATION/TRAINING PROGRAM

## 2024-10-10 PROCEDURE — 2709999900 HC NON-CHARGEABLE SUPPLY: Performed by: STUDENT IN AN ORGANIZED HEALTH CARE EDUCATION/TRAINING PROGRAM

## 2024-10-10 PROCEDURE — 3700000001 HC ADD 15 MINUTES (ANESTHESIA): Performed by: STUDENT IN AN ORGANIZED HEALTH CARE EDUCATION/TRAINING PROGRAM

## 2024-10-10 PROCEDURE — 6360000002 HC RX W HCPCS: Performed by: NURSE PRACTITIONER

## 2024-10-10 PROCEDURE — 7100000010 HC PHASE II RECOVERY - FIRST 15 MIN: Performed by: STUDENT IN AN ORGANIZED HEALTH CARE EDUCATION/TRAINING PROGRAM

## 2024-10-10 PROCEDURE — 85025 COMPLETE CBC W/AUTO DIFF WBC: CPT

## 2024-10-10 PROCEDURE — 88305 TISSUE EXAM BY PATHOLOGIST: CPT

## 2024-10-10 PROCEDURE — 2500000003 HC RX 250 WO HCPCS: Performed by: ANESTHESIOLOGY

## 2024-10-10 PROCEDURE — 36415 COLL VENOUS BLD VENIPUNCTURE: CPT

## 2024-10-10 PROCEDURE — 7100000001 HC PACU RECOVERY - ADDTL 15 MIN: Performed by: STUDENT IN AN ORGANIZED HEALTH CARE EDUCATION/TRAINING PROGRAM

## 2024-10-10 PROCEDURE — 3609012900 HC EGD FOREIGN BODY REMOVAL: Performed by: STUDENT IN AN ORGANIZED HEALTH CARE EDUCATION/TRAINING PROGRAM

## 2024-10-10 PROCEDURE — 96374 THER/PROPH/DIAG INJ IV PUSH: CPT

## 2024-10-10 RX ORDER — PANTOPRAZOLE SODIUM 40 MG/1
40 TABLET, DELAYED RELEASE ORAL
Qty: 90 TABLET | Refills: 1 | Status: SHIPPED | OUTPATIENT
Start: 2024-10-10

## 2024-10-10 RX ORDER — ROCURONIUM BROMIDE 10 MG/ML
INJECTION, SOLUTION INTRAVENOUS
Status: DISCONTINUED | OUTPATIENT
Start: 2024-10-10 | End: 2024-10-10 | Stop reason: SDUPTHER

## 2024-10-10 RX ORDER — SODIUM CHLORIDE 0.9 % (FLUSH) 0.9 %
5-40 SYRINGE (ML) INJECTION PRN
Status: DISCONTINUED | OUTPATIENT
Start: 2024-10-10 | End: 2024-10-10 | Stop reason: HOSPADM

## 2024-10-10 RX ORDER — MORPHINE SULFATE 2 MG/ML
2 INJECTION, SOLUTION INTRAMUSCULAR; INTRAVENOUS EVERY 5 MIN PRN
Status: DISCONTINUED | OUTPATIENT
Start: 2024-10-10 | End: 2024-10-10 | Stop reason: HOSPADM

## 2024-10-10 RX ORDER — FENTANYL CITRATE 50 UG/ML
INJECTION, SOLUTION INTRAMUSCULAR; INTRAVENOUS
Status: DISCONTINUED | OUTPATIENT
Start: 2024-10-10 | End: 2024-10-10 | Stop reason: SDUPTHER

## 2024-10-10 RX ORDER — LIDOCAINE HYDROCHLORIDE 10 MG/ML
INJECTION, SOLUTION EPIDURAL; INFILTRATION; INTRACAUDAL; PERINEURAL
Status: DISCONTINUED | OUTPATIENT
Start: 2024-10-10 | End: 2024-10-10 | Stop reason: SDUPTHER

## 2024-10-10 RX ORDER — DEXAMETHASONE SODIUM PHOSPHATE 10 MG/ML
INJECTION, SOLUTION INTRAMUSCULAR; INTRAVENOUS
Status: DISCONTINUED | OUTPATIENT
Start: 2024-10-10 | End: 2024-10-10 | Stop reason: SDUPTHER

## 2024-10-10 RX ORDER — NALOXONE HYDROCHLORIDE 0.4 MG/ML
INJECTION, SOLUTION INTRAMUSCULAR; INTRAVENOUS; SUBCUTANEOUS PRN
Status: DISCONTINUED | OUTPATIENT
Start: 2024-10-10 | End: 2024-10-10 | Stop reason: HOSPADM

## 2024-10-10 RX ORDER — DIPHENHYDRAMINE HYDROCHLORIDE 50 MG/ML
12.5 INJECTION INTRAMUSCULAR; INTRAVENOUS
Status: DISCONTINUED | OUTPATIENT
Start: 2024-10-10 | End: 2024-10-10 | Stop reason: HOSPADM

## 2024-10-10 RX ORDER — KETOROLAC TROMETHAMINE 30 MG/ML
INJECTION, SOLUTION INTRAMUSCULAR; INTRAVENOUS
Status: DISCONTINUED | OUTPATIENT
Start: 2024-10-10 | End: 2024-10-10 | Stop reason: SDUPTHER

## 2024-10-10 RX ORDER — OXYCODONE AND ACETAMINOPHEN 5; 325 MG/1; MG/1
1 TABLET ORAL
Status: DISCONTINUED | OUTPATIENT
Start: 2024-10-10 | End: 2024-10-10 | Stop reason: HOSPADM

## 2024-10-10 RX ORDER — METOPROLOL TARTRATE 1 MG/ML
INJECTION, SOLUTION INTRAVENOUS
Status: DISCONTINUED | OUTPATIENT
Start: 2024-10-10 | End: 2024-10-10 | Stop reason: SDUPTHER

## 2024-10-10 RX ORDER — GLYCOPYRROLATE 0.2 MG/ML
0.4 INJECTION INTRAMUSCULAR; INTRAVENOUS ONCE
Status: DISCONTINUED | OUTPATIENT
Start: 2024-10-10 | End: 2024-10-10 | Stop reason: HOSPADM

## 2024-10-10 RX ORDER — MIDAZOLAM HYDROCHLORIDE 2 MG/2ML
2 INJECTION, SOLUTION INTRAMUSCULAR; INTRAVENOUS
Status: DISCONTINUED | OUTPATIENT
Start: 2024-10-10 | End: 2024-10-10 | Stop reason: HOSPADM

## 2024-10-10 RX ORDER — GLUCAGON 1 MG/ML
1 KIT INJECTION ONCE
Status: COMPLETED | OUTPATIENT
Start: 2024-10-10 | End: 2024-10-10

## 2024-10-10 RX ORDER — IPRATROPIUM BROMIDE AND ALBUTEROL SULFATE 2.5; .5 MG/3ML; MG/3ML
1 SOLUTION RESPIRATORY (INHALATION)
Status: DISCONTINUED | OUTPATIENT
Start: 2024-10-10 | End: 2024-10-10 | Stop reason: HOSPADM

## 2024-10-10 RX ORDER — HYDRALAZINE HYDROCHLORIDE 20 MG/ML
10 INJECTION INTRAMUSCULAR; INTRAVENOUS
Status: DISCONTINUED | OUTPATIENT
Start: 2024-10-10 | End: 2024-10-10 | Stop reason: HOSPADM

## 2024-10-10 RX ORDER — ONDANSETRON 2 MG/ML
INJECTION INTRAMUSCULAR; INTRAVENOUS
Status: DISCONTINUED | OUTPATIENT
Start: 2024-10-10 | End: 2024-10-10 | Stop reason: SDUPTHER

## 2024-10-10 RX ORDER — ONDANSETRON 2 MG/ML
4 INJECTION INTRAMUSCULAR; INTRAVENOUS ONCE
Status: COMPLETED | OUTPATIENT
Start: 2024-10-10 | End: 2024-10-10

## 2024-10-10 RX ORDER — METOCLOPRAMIDE HYDROCHLORIDE 5 MG/ML
10 INJECTION INTRAMUSCULAR; INTRAVENOUS
Status: DISCONTINUED | OUTPATIENT
Start: 2024-10-10 | End: 2024-10-10 | Stop reason: HOSPADM

## 2024-10-10 RX ORDER — OXYCODONE AND ACETAMINOPHEN 5; 325 MG/1; MG/1
2 TABLET ORAL
Status: DISCONTINUED | OUTPATIENT
Start: 2024-10-10 | End: 2024-10-10 | Stop reason: HOSPADM

## 2024-10-10 RX ORDER — SODIUM CHLORIDE 0.9 % (FLUSH) 0.9 %
5-40 SYRINGE (ML) INJECTION EVERY 12 HOURS SCHEDULED
Status: DISCONTINUED | OUTPATIENT
Start: 2024-10-10 | End: 2024-10-10 | Stop reason: HOSPADM

## 2024-10-10 RX ORDER — SODIUM CHLORIDE, SODIUM LACTATE, POTASSIUM CHLORIDE, CALCIUM CHLORIDE 600; 310; 30; 20 MG/100ML; MG/100ML; MG/100ML; MG/100ML
INJECTION, SOLUTION INTRAVENOUS
Status: DISCONTINUED | OUTPATIENT
Start: 2024-10-10 | End: 2024-10-10 | Stop reason: SDUPTHER

## 2024-10-10 RX ORDER — MEPERIDINE HYDROCHLORIDE 50 MG/ML
12.5 INJECTION INTRAMUSCULAR; INTRAVENOUS; SUBCUTANEOUS EVERY 5 MIN PRN
Status: DISCONTINUED | OUTPATIENT
Start: 2024-10-10 | End: 2024-10-10 | Stop reason: HOSPADM

## 2024-10-10 RX ORDER — SUCCINYLCHOLINE/SOD CL,ISO/PF 100 MG/5ML
SYRINGE (ML) INTRAVENOUS
Status: DISCONTINUED | OUTPATIENT
Start: 2024-10-10 | End: 2024-10-10 | Stop reason: SDUPTHER

## 2024-10-10 RX ORDER — SODIUM CHLORIDE 9 MG/ML
INJECTION, SOLUTION INTRAVENOUS PRN
Status: DISCONTINUED | OUTPATIENT
Start: 2024-10-10 | End: 2024-10-10 | Stop reason: HOSPADM

## 2024-10-10 RX ORDER — ONDANSETRON 2 MG/ML
4 INJECTION INTRAMUSCULAR; INTRAVENOUS
Status: DISCONTINUED | OUTPATIENT
Start: 2024-10-10 | End: 2024-10-10 | Stop reason: HOSPADM

## 2024-10-10 RX ORDER — PROPOFOL 10 MG/ML
INJECTION, EMULSION INTRAVENOUS
Status: DISCONTINUED | OUTPATIENT
Start: 2024-10-10 | End: 2024-10-10 | Stop reason: SDUPTHER

## 2024-10-10 RX ORDER — LABETALOL HYDROCHLORIDE 5 MG/ML
10 INJECTION, SOLUTION INTRAVENOUS
Status: DISCONTINUED | OUTPATIENT
Start: 2024-10-10 | End: 2024-10-10 | Stop reason: HOSPADM

## 2024-10-10 RX ADMIN — PHENYLEPHRINE HYDROCHLORIDE 200 MCG: 10 INJECTION INTRAVENOUS at 20:45

## 2024-10-10 RX ADMIN — FENTANYL CITRATE 25 MCG: 50 INJECTION, SOLUTION INTRAMUSCULAR; INTRAVENOUS at 20:35

## 2024-10-10 RX ADMIN — PHENYLEPHRINE HYDROCHLORIDE 200 MCG: 10 INJECTION INTRAVENOUS at 20:25

## 2024-10-10 RX ADMIN — ROCURONIUM BROMIDE 20 MG: 10 INJECTION, SOLUTION INTRAVENOUS at 19:51

## 2024-10-10 RX ADMIN — PHENYLEPHRINE HYDROCHLORIDE 200 MCG: 10 INJECTION INTRAVENOUS at 20:27

## 2024-10-10 RX ADMIN — FENTANYL CITRATE 100 MCG: 50 INJECTION, SOLUTION INTRAMUSCULAR; INTRAVENOUS at 19:51

## 2024-10-10 RX ADMIN — LIDOCAINE HYDROCHLORIDE 50 MG: 10 INJECTION, SOLUTION EPIDURAL; INFILTRATION; INTRACAUDAL; PERINEURAL at 19:51

## 2024-10-10 RX ADMIN — FENTANYL CITRATE 75 MCG: 50 INJECTION, SOLUTION INTRAMUSCULAR; INTRAVENOUS at 20:54

## 2024-10-10 RX ADMIN — ONDANSETRON 4 MG: 2 INJECTION INTRAMUSCULAR; INTRAVENOUS at 20:40

## 2024-10-10 RX ADMIN — Medication 140 MG: at 19:51

## 2024-10-10 RX ADMIN — ONDANSETRON 4 MG: 2 INJECTION INTRAMUSCULAR; INTRAVENOUS at 18:13

## 2024-10-10 RX ADMIN — PHENYLEPHRINE HYDROCHLORIDE 100 MCG: 10 INJECTION INTRAVENOUS at 20:35

## 2024-10-10 RX ADMIN — SODIUM CHLORIDE, POTASSIUM CHLORIDE, SODIUM LACTATE AND CALCIUM CHLORIDE: 600; 310; 30; 20 INJECTION, SOLUTION INTRAVENOUS at 19:47

## 2024-10-10 RX ADMIN — KETOROLAC TROMETHAMINE 30 MG: 30 INJECTION, SOLUTION INTRAMUSCULAR at 20:40

## 2024-10-10 RX ADMIN — PROPOFOL 200 MG: 10 INJECTION, EMULSION INTRAVENOUS at 19:51

## 2024-10-10 RX ADMIN — SUGAMMADEX 200 MG: 100 INJECTION, SOLUTION INTRAVENOUS at 20:41

## 2024-10-10 RX ADMIN — DEXAMETHASONE SODIUM PHOSPHATE 10 MG: 10 INJECTION, SOLUTION INTRAMUSCULAR; INTRAVENOUS at 19:51

## 2024-10-10 RX ADMIN — METOPROLOL TARTRATE 5 MG: 5 INJECTION INTRAVENOUS at 20:00

## 2024-10-10 RX ADMIN — PHENYLEPHRINE HYDROCHLORIDE 200 MCG: 10 INJECTION INTRAVENOUS at 20:19

## 2024-10-10 RX ADMIN — GLUCAGON 1 MG: 1 INJECTION, POWDER, LYOPHILIZED, FOR SOLUTION INTRAMUSCULAR; INTRAVENOUS at 18:13

## 2024-10-10 ASSESSMENT — PAIN SCALES - GENERAL
PAINLEVEL_OUTOF10: 0
PAINLEVEL_OUTOF10: 8
PAINLEVEL_OUTOF10: 0

## 2024-10-10 ASSESSMENT — PAIN DESCRIPTION - LOCATION: LOCATION: CHEST

## 2024-10-10 ASSESSMENT — PAIN - FUNCTIONAL ASSESSMENT: PAIN_FUNCTIONAL_ASSESSMENT: 0-10

## 2024-10-10 NOTE — CONSULTS
pain  Neurological:  negative for headaches, dizziness, lightheadedness, numbness, pain and tingling extrimities  Behavior/Psych:  negative for depression and anxiety    Code Status:  Prior    Physical Exam:     Vitals:  BP (!) 178/110   Pulse 88   Temp 98 °F (36.7 °C)   Resp 15   Ht 1.88 m (6' 2\")   Wt 99.8 kg (220 lb)   SpO2 98%   BMI 28.25 kg/m²   Temp (24hrs), Av °F (36.7 °C), Min:98 °F (36.7 °C), Max:98 °F (36.7 °C)      General appearance - alert, well appearing, and in no acute distress  Mental status - oriented to person, place, and time with normal affect  Head - normocephalic and atraumatic  Eyes - pupils equal and reactive, extraocular eye movements intact, conjunctiva clear  Ears - hearing appears to be intact  Nose - no drainage noted  Mouth - mucous membranes moist  Neck - supple, no carotid bruits, thyroid not palpable  Chest - normal effort  Heart - normal rate, regular rhythm  Abdomen - soft, nontender, nondistended, no masses, hepatomegaly or splenomegaly. No hernias  Neurological - normal speech, no focal findings or movement disorder noted, cranial nerves II through XII grossly intact  Extremities - peripheral pulses palpable, no pedal edema or calf pain with palpation  Skin - no gross lesions, rashes, or induration noted  Cranial Nerves : grossly intact    Data:   CBC:   Lab Results   Component Value Date/Time    WBC 6.6 10/10/2024 05:52 PM    RBC 5.29 10/10/2024 05:52 PM    HGB 16.2 10/10/2024 05:52 PM    HCT 47.6 10/10/2024 05:52 PM    MCV 90.0 10/10/2024 05:52 PM    MCH 30.5 10/10/2024 05:52 PM    MCHC 33.9 10/10/2024 05:52 PM    RDW 13.2 10/10/2024 05:52 PM     10/10/2024 05:52 PM    MPV 7.9 10/10/2024 05:52 PM     CBC with Differential:    Lab Results   Component Value Date/Time    WBC 6.6 10/10/2024 05:52 PM    RBC 5.29 10/10/2024 05:52 PM    HGB 16.2 10/10/2024 05:52 PM    HCT 47.6 10/10/2024 05:52 PM     10/10/2024 05:52 PM    MCV 90.0 10/10/2024 05:52 PM    MCH  30.5 10/10/2024 05:52 PM    MCHC 33.9 10/10/2024 05:52 PM    RDW 13.2 10/10/2024 05:52 PM    LYMPHOPCT 22 10/10/2024 05:52 PM    MONOPCT 9 10/10/2024 05:52 PM    EOSPCT 5 10/10/2024 05:52 PM    BASOPCT 1 10/10/2024 05:52 PM    MONOSABS 0.60 10/10/2024 05:52 PM    LYMPHSABS 1.40 10/10/2024 05:52 PM    EOSABS 0.30 10/10/2024 05:52 PM    BASOSABS 0.00 10/10/2024 05:52 PM    DIFFTYPE NOT REPORTED 07/23/2013 09:05 AM     Hemoglobin/Hematocrit:    Lab Results   Component Value Date/Time    HGB 16.2 10/10/2024 05:52 PM    HCT 47.6 10/10/2024 05:52 PM     CMP:    Lab Results   Component Value Date/Time     10/10/2024 05:52 PM    K 4.3 10/10/2024 05:52 PM     10/10/2024 05:52 PM    CO2 25 10/10/2024 05:52 PM    BUN 13 10/10/2024 05:52 PM    CREATININE 0.9 10/10/2024 05:52 PM    GFRAA >60 12/04/2021 05:24 AM    LABGLOM >90 10/10/2024 05:52 PM    LABGLOM >90 04/23/2024 09:11 AM    LABGLOM 83 04/19/2023 12:00 AM    GLUCOSE 101 10/10/2024 05:52 PM    CALCIUM 9.9 10/10/2024 05:52 PM    BILITOT 0.7 05/17/2024 10:58 AM    ALKPHOS 83 05/17/2024 10:58 AM    AST 28 05/17/2024 10:58 AM    ALT 25 05/17/2024 10:58 AM     BMP:    Lab Results   Component Value Date/Time     10/10/2024 05:52 PM    K 4.3 10/10/2024 05:52 PM     10/10/2024 05:52 PM    CO2 25 10/10/2024 05:52 PM    BUN 13 10/10/2024 05:52 PM    CREATININE 0.9 10/10/2024 05:52 PM    CALCIUM 9.9 10/10/2024 05:52 PM    GFRAA >60 12/04/2021 05:24 AM    LABGLOM >90 10/10/2024 05:52 PM    LABGLOM >90 04/23/2024 09:11 AM    LABGLOM 83 04/19/2023 12:00 AM    GLUCOSE 101 10/10/2024 05:52 PM     PT/INR:  No results found for: \"PROTIME\", \"INR\"  PTT:  No results found for: \"APTT\"[APTT}    Assesment:     Esophageal food impaction steak since 2 PM today  - No prior GERD, asthma, dysphagia    Hx of diverticular disease  HTN  BMI 28.25    Plan:     EGD today  NPO  After the proc will need PPI BID and follow office visit in 2-3 weeks.         Thank you for allowing me

## 2024-10-10 NOTE — FLOWSHEET NOTE
Spoke with Shyanne with surgery scheduling. EGD scheduled for tonight with Dr. Erwin. Surgery team ETA [id number].

## 2024-10-10 NOTE — H&P
Authorizing Provider   Atogepant (QULIPTA) 60 MG TABS Take 1 tablet by mouth daily 7/15/24   Yes Antelmo Quesada MD   lisinopril (PRINIVIL;ZESTRIL) 40 MG tablet Take 1 tablet by mouth daily 7/9/24   Yes Stanton Martinez DO   atorvastatin (LIPITOR) 40 MG tablet Take 1 tablet by mouth daily 11/20/23   Yes Stanton Martinez DO   rizatriptan (MAXALT) 10 MG tablet Take 1 tablet by mouth once as needed for Migraine May repeat in 2 hours if needed. Do not exceed 2 doses in a 24 hour period. 9/30/24 9/30/24   Antelmo Quesada MD   Onabotulinumtoxin A (BOTOX) 200 units injection Inject IM into 31 FDA approved sites on head, face and neck every 90 days 12/20/23     Nyasia Alarcon MD   diclofenac sodium (VOLTAREN) 1 % GEL Apply 4 g topically 4 times daily as needed for Pain 7/21/22     Rafa Rojas MD   tiZANidine (ZANAFLEX) 4 MG tablet Take 1 tablet by mouth 3 times daily as needed (back pain) 5/24/22     Stanton Martinez DO            Allergies:       Patient has no known allergies.     Social History:      Tobacco:    reports that he quit smoking about 16 years ago. His smoking use included cigarettes. He started smoking about 39 years ago. He has a 23 pack-year smoking history. He has never used smokeless tobacco.  Alcohol:      reports current alcohol use of about 6.0 standard drinks of alcohol per week.  Drug Use:  reports no history of drug use.     Family History:      Family History         Family History   Problem Relation Age of Onset    Cancer Mother           Breast CA            Review of Systems:      Positive and Negative as described in HPI     Constitutional:  negative for  fevers, chills, sweats, fatigue, and weight loss  HEENT:  negative for vision or hearing changes,   Respiratory:  negative for shortness of breath, cough, or congestion  Cardiovascular:  negative for  chest pain, palpitations  Gastrointestinal:  as above  Genitourinary:  negative for frequency, dysuria  Integument:   dysphagia     Hx of diverticular disease  HTN  BMI 28.25     Plan:      EGD today  NPO  After the proc will need PPI BID and follow office visit in 2-3 weeks.            Thank you for allowing me to participate in the care of your patient.  Please feel free to contact me with any questions or concerns.      Electronically signed by Osorio Erwin MD on 10/10/2024 at 7:40 PM      Note is dictated utilizing voice recognition software. Unfortunately this leads to occasional typographical errors. Please contact our office if you have any questions.

## 2024-10-10 NOTE — ANESTHESIA PRE PROCEDURE
Department of Anesthesiology  Preprocedure Note       Name:  Giovanny Hendricks   Age:  64 y.o.  :  1960                                          MRN:  [id number]         Date:  10/10/2024      Surgeon: Surgeon(s):  Osorio Erwin MD    Procedure: Procedure(s):  ESOPHAGOGASTRODUODENOSCOPY FOREIGN BODY REMOVAL FOOD BOLUS    Medications prior to admission:   Prior to Admission medications    Medication Sig Start Date End Date Taking? Authorizing Provider   Atogepant (QULIPTA) 60 MG TABS Take 1 tablet by mouth daily 7/15/24  Yes Antelmo Quesada MD   lisinopril (PRINIVIL;ZESTRIL) 40 MG tablet Take 1 tablet by mouth daily 24  Yes Stanton Martinez DO   atorvastatin (LIPITOR) 40 MG tablet Take 1 tablet by mouth daily 23  Yes Stanton Martinez DO   rizatriptan (MAXALT) 10 MG tablet Take 1 tablet by mouth once as needed for Migraine May repeat in 2 hours if needed. Do not exceed 2 doses in a 24 hour period. 24  Antelmo Quesada MD   Onabotulinumtoxin A (BOTOX) 200 units injection Inject IM into 31 FDA approved sites on head, face and neck every 90 days 23   Nyasia Alarcon MD   diclofenac sodium (VOLTAREN) 1 % GEL Apply 4 g topically 4 times daily as needed for Pain 22   Rafa Rojas MD   tiZANidine (ZANAFLEX) 4 MG tablet Take 1 tablet by mouth 3 times daily as needed (back pain) 22   Stanton Martinez DO       Current medications:    No current facility-administered medications for this encounter.       Allergies:  No Known Allergies    Problem List:    Patient Active Problem List   Diagnosis Code   • Dyslipidemia E78.5   • Diverticulosis K57.90   • Parastomal hernia (HCC) K43.5   • Lumbar disc disease with radiculopathy M51.16   • Chronic knee pain after total replacement of left knee joint M25.562, G89.29, Z96.652   • VTE (venous thromboembolism) I82.90   • Essential hypertension I10   • Intractable chronic migraine without aura and without status

## 2024-10-11 NOTE — ED PROVIDER NOTES
[facility] OR  EMERGENCY DEPARTMENT ENCOUNTER      Pt Name: Giovanny Hendricks  MRN: [id number]  Birthdate 1960  Date of evaluation: 10/10/2024  Provider: HONG Lea CNP  8:25 PM    CHIEF COMPLAINT       Chief Complaint   Patient presents with    Foreign Body     Got meat stuck in esophogus, can't swallow his own spit         HISTORY OF PRESENT ILLNESS    Giovanny Hendricks is a 64 y.o. male who presents to the emergency department for evaluation of a food bolus foreign body sensation in the esophagus.  Patient was eating prime rib, felt to get stuck tried to swallow water felt like he was choking on the water and since then has not been able to swallow his own saliva.  He arrives about an hour after initial incident.  No history of this    HPI    Nursing Notes were reviewed.    REVIEW OF SYSTEMS       Review of Systems   All other systems reviewed and are negative.      Except as noted above the remainder of the review of systems was reviewed and negative.       PAST MEDICAL HISTORY     Past Medical History:   Diagnosis Date    COVID-19     November 28th, 2020    Diverticular disease of colon     Hyperlipidemia     Hypertension     Pulmonary emboli (HCC)     covid related         SURGICAL HISTORY       Past Surgical History:   Procedure Laterality Date    COLECTOMY  2/1/15        HERNIA REPAIR  1990's    HERNIA REPAIR  12/03/2021     HERNIA INCISIONAL REPAIR LAPAROSCOPIC ROBOTIC WITH MESH    JOINT REPLACEMENT  2009    L TKA    REVISION COLOSTOMY      SIGMOIDECTOMY  2/1/15    [facility]/ Dr. Heidi Reis    VENTRAL HERNIA REPAIR N/A 12/3/2021    HERNIA INCISIONAL REPAIR LAPAROSCOPIC ROBOTIC WITH MESH CONVERTED TO OPEN AT 15:20 performed by Danish Slade DO at [facility] OR         CURRENT MEDICATIONS       Current Discharge Medication List        CONTINUE these medications which have NOT CHANGED    Details   Atogepant (QULIPTA) 60 MG TABS Take 1 tablet by mouth daily  Qty: 30 tablet,    Performed by ED Physician - none    LABS:  Labs Reviewed   CBC WITH AUTO DIFFERENTIAL - Abnormal; Notable for the following components:       Result Value    Lymphocytes % 22 (*)     Eosinophils % 5 (*)     All other components within normal limits   BASIC METABOLIC PANEL - Abnormal; Notable for the following components:    Glucose 101 (*)     All other components within normal limits       All other labs were within normal range or not returned as of this dictation.    EMERGENCY DEPARTMENT COURSE and DIFFERENTIAL DIAGNOSIS/MDM:   Vitals:    Vitals:    10/10/24 1744 10/10/24 1751 10/10/24 1924   BP:  (!) 169/114 (!) 178/110   Pulse: (!) 118  88   Resp:  20 15   Temp:   98 °F (36.7 °C)   SpO2: 100%  98%   Weight: 99.8 kg (220 lb)     Height: 1.88 m (6' 2\")             Medical Decision Making  Amount and/or Complexity of Data Reviewed  Labs: ordered.  Radiology: ordered.    Risk  Prescription drug management.  Decision regarding hospitalization.    IV was initiated upon arrival to the room and we did administer glucagon and Zofran.    Patient remains intolerable of his own secretions and I did contact GI Dr. Avitia who called in the endoscopy team            REASSESSMENT          CRITICAL CARE TIME       CONSULTS:  None    PROCEDURES:  Unless otherwise noted below, none     Procedures        FINAL IMPRESSION      1. Foreign body in esophagus, initial encounter          DISPOSITION/PLAN   DISPOSITION Send To Endoscopy 10/10/2024 06:42:47 PM  Condition at Disposition: Data Unavailable      PATIENT REFERRED TO:  No follow-up provider specified.    DISCHARGE MEDICATIONS:  Current Discharge Medication List        Controlled Substances Monitoring:     RX Monitoring Acute Pain Prescriptions   12/7/2021   1:48 PM Prescription exceeds daily limit for a specific reason. See comments or note.       (Please note that portions of this note were completed with a voice recognition program.  Efforts were made to edit the

## 2024-10-11 NOTE — OP NOTE
Findings:    Retropharyngeal area was grossly normal appearing    Esophagus:   Normal upper and middle esophagus- biopsy obtained for rule out of EOE  Large amount of steak in lower esophagus due to . Foreign body removal successful with rat tooth forceps and Hartley net    Stomach:    Fundus: visualization hindered with food in the stomach    Body: abnormal: two 1 cm ulcers with pigmented spots    Antrum: abnormal: erosive gastritis with clean based erosions  Biopsy obtained to r/o- H pylori     Stomach appropriately distensible with gas on direct and retroflexion view    Duodenum:     Descending: normal    Bulb: normal    The scope was removed and the patient tolerated the procedure well.     Impression-   Food impaction steak at the level of GE junction due to GE junction strictures- foreign body removal with rat tooth and hartley net  Erosive gastritis in antrum and two 1 cm proximal body gastric ulcers with pigmented spots- gastritis biopsied  NL proximal and mid Esophagus-  biopsied     Recommendations/Plan:   Discharge home  May have CLD today then soft diet till next EGD  PPI BID  Avoid NSAIDs  Office visit in 2 weeks and Repeat EGD in 4-8 weeks for dilation and gastric ulcer healing check    Electronically signed by Osorio Erwin MD  on 10/10/2024 at 8:44 PM

## 2024-10-11 NOTE — DISCHARGE INSTRUCTIONS
Protonix twice a day till EGD in 2 months  Soft food till then  Avoid NSAIDs like ibuprofen, motrin, aleve, naproxen    Activity   You have had anesthesia today  Do not drive, operate heavy equipment, consume alcoholic beverages, or make any important decisions  for 24 hours   If you are taking pain medication: Do not drive or consume alcohol.  Take your time changing positions today. You may feel light headed or dizzy if you move too quickly.   Continue your home medications as ordered by your physician.  Diet   You can eat your normal diet when you feel well. You should start off with bland foods like chicken soup, toast, or yogurt. Then advance as tolerated.  Drink plenty of fluids (unless your doctor tells you not to). Your urine should be very lightly colored without a strong odor.

## 2024-10-11 NOTE — ANESTHESIA POSTPROCEDURE EVALUATION
Department of Anesthesiology  Postprocedure Note    Patient: Giovanny Hendricks  MRN: [id number]  YOB: 1960  Date of evaluation: 10/10/2024    Procedure Summary       Date: 10/10/24 Room / Location: [address]    Anesthesia Start: 1947 Anesthesia Stop: 2059    Procedure: ESOPHAGOGASTRODUODENOSCOPY FOREIGN BODY REMOVAL FOOD BOLUS (Esophagus) Diagnosis:       Food impaction of esophagus, initial encounter      (Food impaction of esophagus, initial encounter [T18.128A, W44.F3XA])    Surgeons: Osorio Erwin MD Responsible Provider: Dio Maldonado MD    Anesthesia Type: general ASA Status: 2 - Emergent            Anesthesia Type: No value filed.    Esther Phase I: Esther Score: 8    Esther Phase II:      Anesthesia Post Evaluation    Patient location during evaluation: PACU  Patient participation: complete - patient participated  Level of consciousness: awake and alert  Airway patency: patent  Nausea & Vomiting: no nausea and no vomiting  Cardiovascular status: hemodynamically stable  Respiratory status: nasal cannula and spontaneous ventilation  Hydration status: euvolemic  Multimodal analgesia pain management approach  Pain management: adequate    No notable events documented.

## 2024-10-15 LAB — SURGICAL PATHOLOGY REPORT: NORMAL

## 2024-10-15 NOTE — ED PROVIDER NOTES
[facility] OR  [address] [address] [place] [address]  Phone: [phone]      Attending Physician Attestation          CHIEF COMPLAINT       Chief Complaint   Patient presents with    Foreign Body     Got meat stuck in esophogus, can't swallow his own spit       DIAGNOSTIC RESULTS     LABS:  Labs Reviewed   CBC WITH AUTO DIFFERENTIAL - Abnormal; Notable for the following components:       Result Value    Lymphocytes % 22 (*)     Eosinophils % 5 (*)     All other components within normal limits   BASIC METABOLIC PANEL - Abnormal; Notable for the following components:    Glucose 101 (*)     All other components within normal limits   SURGICAL PATHOLOGY   SURGICAL PATHOLOGY   SURGICAL PATHOLOGY REPORT       All other labs were within normal range or not returned as of this dictation.    RADIOLOGY:  No orders to display         EMERGENCY DEPARTMENT COURSE:   Vitals:    Vitals:    10/10/24 2105 10/10/24 2108 10/10/24 2115 10/10/24 2130   BP:  (!) 131/101 (!) 131/101    Pulse: 84 85 78 78   Resp: 16 13 10    Temp:       SpO2: 98% 93% 94%    Weight:       Height:         -------------------------  BP: (!) 131/101, Temp: 97.4 °F (36.3 °C), Pulse: 78, Respirations: 10             PERTINENT ATTENDING PHYSICIAN COMMENTS:    I performed a history and physical examination of the patient and discussed management with the mid level provider. I reviewed the mid level provider's note and agree with the documented findings and plan of care. Any areas of disagreement are noted on the chart. I was personally present for the key portions of any procedures. I have documented in the chart those procedures where I was not present during the key portions. I have reviewed the emergency nurses triage note. I agree with the chief complaint, past medical history, past surgical history, allergies, medications, social and family history as documented unless otherwise noted below. Documentation of the HPI, Physical Exam and

## 2024-10-16 NOTE — PROGRESS NOTES
Reason for Referral:     No referring provider defined for this encounter.    Chief Complaint   Patient presents with    Follow-up     F/u egd on 10-10-24  Pt denies any GI issues at this time        1. GERD with stricture    2. Eosinophilic esophagitis    3. Erosive gastritis    4. Chronic gastric ulcer without hemorrhage and without perforation    5. Diverticular disease    6. Esophageal stricture        HISTORY OF PRESENT ILLNESS: Mr.Brian Hendricks is a 64 y.o. male with a past history remarkable for diverticular disease, referred as follow up of food bolus.    He is feeling good after the procedure.  He is taking soft foods.  Eosinophil count was more than 15. He is continuing PPI BID.  He denies any asthma or seasonal allergies in the past.  He denies being on blood thinners.  He has a knee surgery end of November and he wants to make sure there is no interference with that surgery    Previous Endoscopies  EGD 10/10/24  Food impaction steak at the level of GE junction due to GE junction strictures- foreign body removal with rat tooth and servin net  Erosive gastritis in antrum and two 1 cm proximal body gastric ulcers with pigmented spots- gastritis biopsied  NL proximal and mid Esophagus-  biopsied     PATH  A. STOMACH, BIOPSY:  Unremarkable gastric mucosa. Immunostain for H.   pylori is negative (with appropriate control).      B. ESOPHAGUS, BIOPSY:  Squamous mucosa with mild basal cell   hyperplasia and focally increased intraepithelial eosinophils (up to   17/hpf).     According to patient, colonoscopy was few years ago and it was normal.  Not in records    Previous GI workup     Patient's PMH/PSH,SH,PSYCH Hx, MEDs, ALLERGIES, and ROS were all reviewed and updated in the appropriate sections.    PAST MEDICAL HISTORY:  Past Medical History:   Diagnosis Date    COVID-19 November 28th, 2020    Diverticular disease of colon     Hyperlipidemia     Hypertension     Pulmonary emboli (HCC)     covid

## 2024-10-17 ENCOUNTER — OFFICE VISIT (OUTPATIENT)
Dept: GASTROENTEROLOGY | Age: 64
End: 2024-10-17
Payer: COMMERCIAL

## 2024-10-17 ENCOUNTER — TELEPHONE (OUTPATIENT)
Dept: GASTROENTEROLOGY | Age: 64
End: 2024-10-17

## 2024-10-17 ENCOUNTER — OFFICE VISIT (OUTPATIENT)
Dept: FAMILY MEDICINE CLINIC | Age: 64
End: 2024-10-17
Payer: COMMERCIAL

## 2024-10-17 VITALS
DIASTOLIC BLOOD PRESSURE: 92 MMHG | BODY MASS INDEX: 29.02 KG/M2 | OXYGEN SATURATION: 98 % | HEART RATE: 74 BPM | WEIGHT: 226 LBS | SYSTOLIC BLOOD PRESSURE: 142 MMHG

## 2024-10-17 VITALS
OXYGEN SATURATION: 98 % | HEART RATE: 77 BPM | TEMPERATURE: 97.8 F | WEIGHT: 226 LBS | BODY MASS INDEX: 29 KG/M2 | DIASTOLIC BLOOD PRESSURE: 90 MMHG | HEIGHT: 74 IN | RESPIRATION RATE: 18 BRPM | SYSTOLIC BLOOD PRESSURE: 141 MMHG

## 2024-10-17 DIAGNOSIS — K21.9 GERD WITH STRICTURE: Primary | ICD-10-CM

## 2024-10-17 DIAGNOSIS — K57.90 DIVERTICULAR DISEASE: ICD-10-CM

## 2024-10-17 DIAGNOSIS — T18.128A ESOPHAGEAL OBSTRUCTION DUE TO FOOD IMPACTION: ICD-10-CM

## 2024-10-17 DIAGNOSIS — K21.9 GERD WITH STRICTURE: ICD-10-CM

## 2024-10-17 DIAGNOSIS — K29.60 EROSIVE GASTRITIS: ICD-10-CM

## 2024-10-17 DIAGNOSIS — G89.29 CHRONIC KNEE PAIN AFTER TOTAL REPLACEMENT OF LEFT KNEE JOINT: ICD-10-CM

## 2024-10-17 DIAGNOSIS — K25.7 CHRONIC GASTRIC ULCER WITHOUT HEMORRHAGE AND WITHOUT PERFORATION: ICD-10-CM

## 2024-10-17 DIAGNOSIS — Z96.652 CHRONIC KNEE PAIN AFTER TOTAL REPLACEMENT OF LEFT KNEE JOINT: ICD-10-CM
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
## 2024-10-17 DIAGNOSIS — K20.0 EOSINOPHILIC ESOPHAGITIS: ICD-10-CM

## 2024-10-17 DIAGNOSIS — I10 ESSENTIAL HYPERTENSION: Primary | ICD-10-CM

## 2024-10-17 DIAGNOSIS — K22.2 ESOPHAGEAL STRICTURE: ICD-10-CM

## 2024-10-17 DIAGNOSIS — M25.562 CHRONIC KNEE PAIN AFTER TOTAL REPLACEMENT OF LEFT KNEE JOINT: ICD-10-CM

## 2024-10-17 DIAGNOSIS — K22.2 GERD WITH STRICTURE: Primary | ICD-10-CM

## 2024-10-17 DIAGNOSIS — K22.2 GERD WITH STRICTURE: ICD-10-CM

## 2024-10-17 PROCEDURE — 3077F SYST BP >= 140 MM HG: CPT | Performed by: FAMILY MEDICINE

## 2024-10-17 PROCEDURE — 99214 OFFICE O/P EST MOD 30 MIN: CPT | Performed by: FAMILY MEDICINE

## 2024-10-17 PROCEDURE — 3080F DIAST BP >= 90 MM HG: CPT | Performed by: FAMILY MEDICINE

## 2024-10-17 PROCEDURE — 3077F SYST BP >= 140 MM HG: CPT | Performed by: STUDENT IN AN ORGANIZED HEALTH CARE EDUCATION/TRAINING PROGRAM

## 2024-10-17 PROCEDURE — 3080F DIAST BP >= 90 MM HG: CPT | Performed by: STUDENT IN AN ORGANIZED HEALTH CARE EDUCATION/TRAINING PROGRAM

## 2024-10-17 PROCEDURE — 99214 OFFICE O/P EST MOD 30 MIN: CPT | Performed by: STUDENT IN AN ORGANIZED HEALTH CARE EDUCATION/TRAINING PROGRAM

## 2024-10-17 RX ORDER — LISINOPRIL AND HYDROCHLOROTHIAZIDE 12.5; 2 MG/1; MG/1
2 TABLET ORAL DAILY
Qty: 180 TABLET | Refills: 1 | Status: SHIPPED | OUTPATIENT
Start: 2024-10-17

## 2024-10-17 SDOH — ECONOMIC STABILITY: FOOD INSECURITY: WITHIN THE PAST 12 MONTHS, YOU WORRIED THAT YOUR FOOD WOULD RUN OUT BEFORE YOU GOT MONEY TO BUY MORE.: NEVER TRUE

## 2024-10-17 SDOH — ECONOMIC STABILITY: FOOD INSECURITY: WITHIN THE PAST 12 MONTHS, THE FOOD YOU BOUGHT JUST DIDN'T LAST AND YOU DIDN'T HAVE MONEY TO GET MORE.: NEVER TRUE

## 2024-10-17 SDOH — ECONOMIC STABILITY: INCOME INSECURITY: HOW HARD IS IT FOR YOU TO PAY FOR THE VERY BASICS LIKE FOOD, HOUSING, MEDICAL CARE, AND HEATING?: NOT HARD AT ALL

## 2024-10-17 ASSESSMENT — ENCOUNTER SYMPTOMS
GASTROINTESTINAL NEGATIVE: 1
RESPIRATORY NEGATIVE: 1
BACK PAIN: 1
EYES NEGATIVE: 1
ALLERGIC/IMMUNOLOGIC NEGATIVE: 1

## 2024-10-17 NOTE — TELEPHONE ENCOUNTER
An EGD was ordered at the time of appointment.  The patient is having a knee surgery on 11/26/24 and wanted to hold off on this for now.  Will let us know when he is ready to schedule.  Also, per Dr. Erwin please schedule the follow up appointment 2-4 weeks after procedure.  Thank you.

## 2024-10-17 NOTE — PROGRESS NOTES
weight bearing. Treatments tried: s/p replacement - going to see knee doc.       Review of Systems   Review of Systems   Constitutional: Negative.    HENT: Negative.     Eyes: Negative.    Respiratory: Negative.     Cardiovascular: Negative.    Gastrointestinal: Negative.    Endocrine: Negative.    Skin: Negative.    Allergic/Immunologic: Negative.    Hematological: Negative.    Psychiatric/Behavioral: Negative.     All other systems reviewed and are negative.      Medications     Current Outpatient Medications   Medication Sig Dispense Refill    lisinopril-hydroCHLOROthiazide (PRINZIDE;ZESTORETIC) 20-12.5 MG per tablet Take 2 tablets by mouth daily 180 tablet 1    pantoprazole (PROTONIX) 40 MG tablet Take 1 tablet by mouth 2 times daily (before meals) 90 tablet 1    rizatriptan (MAXALT) 10 MG tablet Take 1 tablet by mouth once as needed for Migraine May repeat in 2 hours if needed. Do not exceed 2 doses in a 24 hour period. 12 tablet 2    Atogepant (QULIPTA) 60 MG TABS Take 1 tablet by mouth daily 30 tablet 5    Onabotulinumtoxin A (BOTOX) 200 units injection Inject IM into 31 FDA approved sites on head, face and neck every 90 days 1 each 4    atorvastatin (LIPITOR) 40 MG tablet Take 1 tablet by mouth daily 90 tablet 3    diclofenac sodium (VOLTAREN) 1 % GEL Apply 4 g topically 4 times daily as needed for Pain 200 g 0    tiZANidine (ZANAFLEX) 4 MG tablet Take 1 tablet by mouth 3 times daily as needed (back pain) 30 tablet 0     No current facility-administered medications for this visit.       Past History    Past Medical History:   has a past medical history of COVID-19, Diverticular disease of colon, Hyperlipidemia, Hypertension, and Pulmonary emboli (HCC).    Social History:   reports that he quit smoking about 16 years ago. His smoking use included cigarettes. He started smoking about 39 years ago. He has a 23 pack-year smoking history. He has never used smokeless tobacco. He reports current alcohol use of

## 2024-10-30 ENCOUNTER — HOSPITAL ENCOUNTER (OUTPATIENT)
Dept: PREADMISSION TESTING | Age: 64
Discharge: HOME | End: 2024-11-03
Payer: COMMERCIAL

## 2024-10-30 ENCOUNTER — HOSPITAL ENCOUNTER (OUTPATIENT)
Dept: GENERAL RADIOLOGY | Age: 64
Discharge: HOME | End: 2024-11-01
Payer: COMMERCIAL

## 2024-10-30 VITALS
SYSTOLIC BLOOD PRESSURE: 125 MMHG | HEART RATE: 75 BPM | TEMPERATURE: 97.7 F | HEIGHT: 74 IN | DIASTOLIC BLOOD PRESSURE: 84 MMHG | OXYGEN SATURATION: 99 % | WEIGHT: 220.4 LBS | BODY MASS INDEX: 28.28 KG/M2 | RESPIRATION RATE: 18 BRPM

## 2024-10-30 DIAGNOSIS — Z01.818 PRE-OP TESTING: ICD-10-CM

## 2024-10-30 LAB
ABO + RH BLD: NORMAL
ALBUMIN SERPL-MCNC: 4.3 G/DL (ref 3.5–5.2)
ALP SERPL-CCNC: 111 U/L (ref 40–129)
ALT SERPL-CCNC: 19 U/L (ref 5–41)
ANION GAP SERPL CALCULATED.3IONS-SCNC: 11 MMOL/L (ref 9–17)
ARM BAND NUMBER: NORMAL
AST SERPL-CCNC: 21 U/L
BILIRUB SERPL-MCNC: 0.5 MG/DL (ref 0.3–1.2)
BILIRUB UR QL STRIP: NEGATIVE
BLOOD BANK SAMPLE EXPIRATION: NORMAL
BLOOD GROUP ANTIBODIES SERPL: NEGATIVE
BUN SERPL-MCNC: 11 MG/DL (ref 8–23)
BUN/CREAT SERPL: 14 (ref 9–20)
CALCIUM SERPL-MCNC: 9.5 MG/DL (ref 8.6–10.4)
CHLORIDE SERPL-SCNC: 98 MMOL/L (ref 98–107)
CLARITY UR: CLEAR
CO2 SERPL-SCNC: 29 MMOL/L (ref 20–31)
COLOR UR: YELLOW
COMMENT: NORMAL
CREAT SERPL-MCNC: 0.8 MG/DL (ref 0.7–1.2)
EKG ATRIAL RATE: 75 BPM
EKG P AXIS: 39 DEGREES
EKG P-R INTERVAL: 188 MS
EKG Q-T INTERVAL: 392 MS
EKG QRS DURATION: 94 MS
EKG QTC CALCULATION (BAZETT): 437 MS
EKG R AXIS: -19 DEGREES
EKG T AXIS: 5 DEGREES
EKG VENTRICULAR RATE: 75 BPM
ERYTHROCYTE [DISTWIDTH] IN BLOOD BY AUTOMATED COUNT: 12.2 % (ref 11.8–14.4)
EST. AVERAGE GLUCOSE BLD GHB EST-MCNC: 111 MG/DL
GFR, ESTIMATED: >90 ML/MIN/1.73M2
GLUCOSE SERPL-MCNC: 77 MG/DL (ref 70–99)
GLUCOSE UR STRIP-MCNC: NEGATIVE MG/DL
HBA1C MFR BLD: 5.5 % (ref 4–6)
HCT VFR BLD AUTO: 50.2 % (ref 40.7–50.3)
HGB BLD-MCNC: 16.7 G/DL (ref 13–17)
HGB UR QL STRIP.AUTO: NEGATIVE
KETONES UR STRIP-MCNC: NEGATIVE MG/DL
LEUKOCYTE ESTERASE UR QL STRIP: NEGATIVE
MCH RBC QN AUTO: 30.8 PG (ref 25.2–33.5)
MCHC RBC AUTO-ENTMCNC: 33.3 G/DL (ref 28.4–34.8)
MCV RBC AUTO: 92.4 FL (ref 82.6–102.9)
NITRITE UR QL STRIP: NEGATIVE
NRBC BLD-RTO: 0 PER 100 WBC
PH UR STRIP: 7 [PH] (ref 5–8)
PLATELET # BLD AUTO: 202 K/UL (ref 138–453)
PMV BLD AUTO: 9.5 FL (ref 8.1–13.5)
POTASSIUM SERPL-SCNC: 3.9 MMOL/L (ref 3.7–5.3)
PROT SERPL-MCNC: 7.3 G/DL (ref 6.4–8.3)
PROT UR STRIP-MCNC: NEGATIVE MG/DL
RBC # BLD AUTO: 5.43 M/UL (ref 4.21–5.77)
SODIUM SERPL-SCNC: 138 MMOL/L (ref 135–144)
SP GR UR STRIP: 1.01 (ref 1–1.03)
UROBILINOGEN UR STRIP-ACNC: NORMAL EU/DL (ref 0–1)
WBC OTHER # BLD: 7.2 K/UL (ref 3.5–11.3)

## 2024-10-30 PROCEDURE — 93005 ELECTROCARDIOGRAM TRACING: CPT

## 2024-10-30 PROCEDURE — 80053 COMPREHEN METABOLIC PANEL: CPT

## 2024-10-30 PROCEDURE — 87641 MR-STAPH DNA AMP PROBE: CPT

## 2024-10-30 PROCEDURE — 81003 URINALYSIS AUTO W/O SCOPE: CPT

## 2024-10-30 PROCEDURE — 36415 COLL VENOUS BLD VENIPUNCTURE: CPT

## 2024-10-30 PROCEDURE — 86900 BLOOD TYPING SEROLOGIC ABO: CPT

## 2024-10-30 PROCEDURE — 85027 COMPLETE CBC AUTOMATED: CPT

## 2024-10-30 PROCEDURE — 86901 BLOOD TYPING SEROLOGIC RH(D): CPT

## 2024-10-30 PROCEDURE — 93010 ELECTROCARDIOGRAM REPORT: CPT | Performed by: INTERNAL MEDICINE

## 2024-10-30 PROCEDURE — 83036 HEMOGLOBIN GLYCOSYLATED A1C: CPT

## 2024-10-30 PROCEDURE — 86850 RBC ANTIBODY SCREEN: CPT

## 2024-10-30 PROCEDURE — 71046 X-RAY EXAM CHEST 2 VIEWS: CPT

## 2024-10-30 RX ORDER — ACETAMINOPHEN 500 MG
1000 TABLET ORAL ONCE
OUTPATIENT
Start: 2024-11-26

## 2024-10-30 RX ORDER — GABAPENTIN 300 MG/1
300 CAPSULE ORAL ONCE
OUTPATIENT
Start: 2024-11-26

## 2024-10-30 RX ORDER — CELECOXIB 200 MG/1
200 CAPSULE ORAL ONCE
OUTPATIENT
Start: 2024-11-26

## 2024-10-30 ASSESSMENT — PAIN DESCRIPTION - DESCRIPTORS: DESCRIPTORS: ACHING

## 2024-10-30 ASSESSMENT — PAIN DESCRIPTION - ORIENTATION: ORIENTATION: LEFT

## 2024-10-30 ASSESSMENT — PAIN SCALES - GENERAL: PAINLEVEL_OUTOF10: 2

## 2024-10-30 ASSESSMENT — PAIN DESCRIPTION - LOCATION: LOCATION: KNEE
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
## 2024-10-30 NOTE — PRE-PROCEDURE INSTRUCTIONS
On the Day of Your Surgery, Tuesday, 11/26/2024, Please Arrive At Dr. Adkins's office will call the week prior to surgery with arrival time     Enter the hospital through the Main Entrance, take the lobby elevators to the second floor and check in at the Surgery Registration desk.     Continue to take your home medications as you normally do up to and including the night before surgery with the exception of blood thinning medications.    Blood Thinning Medications:  Please stop prescription blood thinning medications such as Apixaban (Eliquis); Clopidogrel (Plavix); Dabigatran (Pradaxa); Prasugrel (Effient); Rivaroxaban (Xarelto); Ticagrelor (Brilinta); Warfarin (Coumadin) only as directed by your surgeon and/or the prescribing physician    Some common examples of other medications that can thin your blood are: Aspirin, Ibuprofen (Advil, Motrin), Naproxen (Aleve), Meloxicam (Mobic), Celecoxib (Celebrex), Fish Oil, many Herbal Supplements.  These medications should usually be stopped at least 7 days prior to surgery.    Voltaren gel    Tylenol is OK to take for pain the week prior to surgery.    Failure to stop certain medications may interfere with your scheduled surgery.    If you receive instructions from your surgeon regarding what medications to stop prior to surgery, please follow those specific instructions.    If You Have Diabetes:  Do not take any of your diabetic medications, (injectables or by mouth) the morning of surgery unless otherwise instructed by the doctor who manages your diabetes. If you are taking insulin, contact the doctor the manages your diabetes for instructions about any changes to your insulin dosages the day before surgery.      Please take the following medication(s) the day of surgery with small sips of water:              Pantoprazole, Qulipta    Please use your inhaler(s) if needed and bring your inhaler(s) from home the day of surgery.    Showering Before Surgery:     You can play

## 2024-10-31 ENCOUNTER — ANESTHESIA EVENT (OUTPATIENT)
Dept: OPERATING ROOM | Age: 64
End: 2024-10-31
Payer: COMMERCIAL

## 2024-10-31 ENCOUNTER — OFFICE VISIT (OUTPATIENT)
Dept: NEUROLOGY | Age: 64
End: 2024-10-31

## 2024-10-31 DIAGNOSIS — G43.719 INTRACTABLE CHRONIC MIGRAINE WITHOUT AURA AND WITHOUT STATUS MIGRAINOSUS: Primary | ICD-10-CM

## 2024-10-31 LAB
MRSA, DNA, NASAL: NEGATIVE
SPECIMEN DESCRIPTION: NORMAL

## 2024-10-31 NOTE — PROGRESS NOTES
[facility] [place] [id number], [address]  [place] [address]  Ph: [phone] or [phone]  FAX: [phone]          Indication for treatment: Chronic migraine without aura, intractable, with status migrainosus (G 43.249)  Consent form was signed. Please see the associated scanned paper.   Potential risks and benefits for the procedure were explained to the patient.   Procedure: 30-gauge half inch needle was used and 200 U vial Botox was prepared with 4ml 0.9% NS.155 units of Botox were used and 45 units of Botox were discarded.   Botox was injected at 31 different sites as follows:     Order  Muscle  Units injected    A  Corrugator1  10 units at 2 div sites    B  Procerus  5 Units in 1 site    C  Frontalis¹  20 Units div. 4 sites    D  Temporalis¹  40 Units div 8 site    E  Occipitalis¹  30 Units div. 6 sites    F  Cervical paraspinal muscles¹  20 Units div 4 sites    G  Trapezius¹  30 Units div 6 sites    Total Dose  155 Units div 31 sites    1 Dose distributed bilaterally  Blood loss: Less than 1 cc  BOTOX Units used: 155 Units  BOTOX units discarded: 45 Units   Complications: none
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
## 2024-11-11 NOTE — TELEPHONE ENCOUNTER
Pharmacy requesting refill of Maxalt 10 mg tablets.    Medication active on med list yes    Date of last Rx: 9/30/2024 #12 with 2 refills   verified by MINAL Nogueira    Date of last appointment 3/29/2024    Next Visit Date:  2/5/2025

## 2024-11-12 ENCOUNTER — PATIENT MESSAGE (OUTPATIENT)
Dept: ORTHOPEDIC SURGERY | Age: 64
End: 2024-11-12

## 2024-11-12 NOTE — TELEPHONE ENCOUNTER
Blaine Baltazar, It looks like the time for your surgery is going to be 11:15am! Good luck, we will see you after surgery. If there is anything else you need please let us know.

## 2024-11-13 RX ORDER — RIZATRIPTAN BENZOATE 10 MG/1
TABLET ORAL
Qty: 12 TABLET | Refills: 2 | Status: SHIPPED | OUTPATIENT
Start: 2024-11-13

## 2024-11-14 ENCOUNTER — OFFICE VISIT (OUTPATIENT)
Dept: FAMILY MEDICINE CLINIC | Age: 64
End: 2024-11-14
Payer: COMMERCIAL

## 2024-11-14 VITALS
OXYGEN SATURATION: 95 % | BODY MASS INDEX: 29.13 KG/M2 | RESPIRATION RATE: 16 BRPM | SYSTOLIC BLOOD PRESSURE: 112 MMHG | DIASTOLIC BLOOD PRESSURE: 62 MMHG | TEMPERATURE: 98.4 F | WEIGHT: 227 LBS | HEART RATE: 86 BPM | HEIGHT: 74 IN

## 2024-11-14 DIAGNOSIS — I10 ESSENTIAL HYPERTENSION: ICD-10-CM

## 2024-11-14 DIAGNOSIS — Z01.818 PREOP EXAMINATION: Primary | ICD-10-CM

## 2024-11-14 DIAGNOSIS — Z96.652 CHRONIC KNEE PAIN AFTER TOTAL REPLACEMENT OF LEFT KNEE JOINT: ICD-10-CM

## 2024-11-14 DIAGNOSIS — G89.29 CHRONIC KNEE PAIN AFTER TOTAL REPLACEMENT OF LEFT KNEE JOINT: ICD-10-CM

## 2024-11-14 DIAGNOSIS — Z71.89 ENCOUNTER FOR MEDICATION REVIEW AND COUNSELING: ICD-10-CM

## 2024-11-14 DIAGNOSIS — M25.562 CHRONIC KNEE PAIN AFTER TOTAL REPLACEMENT OF LEFT KNEE JOINT: ICD-10-CM

## 2024-11-14 PROCEDURE — 3074F SYST BP LT 130 MM HG: CPT | Performed by: FAMILY MEDICINE

## 2024-11-14 PROCEDURE — 3078F DIAST BP <80 MM HG: CPT | Performed by: FAMILY MEDICINE

## 2024-11-14 PROCEDURE — 99214 OFFICE O/P EST MOD 30 MIN: CPT | Performed by: FAMILY MEDICINE

## 2024-11-14 ASSESSMENT — ENCOUNTER SYMPTOMS
ALLERGIC/IMMUNOLOGIC NEGATIVE: 1
GASTROINTESTINAL NEGATIVE: 1
EYES NEGATIVE: 1
RESPIRATORY NEGATIVE: 1

## 2024-11-14 NOTE — PROGRESS NOTES
[facility]     Date of Visit:  2024  Patient Name: Giovanny Hendricks   Patient :  1960     CHIEF COMPLAINT:     Giovanny Hendricks is a 64 y.o. male who presents today for an general visit to be evaluated for the following condition(s):    Chief Complaint   Patient presents with    Pre-op Exam       HISTORY OF PRESENT ILLNESS:         LEFT KNEE TOTAL ARTHROPLASTY REVISION - planned on  with Dr. Rizwan Adkins.    Preop labs, work up and EKG all reviewed this afternoon.         REVIEW OF SYSTEMS:        Review of Systems   Constitutional: Negative.    HENT: Negative.     Eyes: Negative.    Respiratory: Negative.     Cardiovascular: Negative.    Gastrointestinal: Negative.    Endocrine: Negative.    Genitourinary: Negative.    Musculoskeletal: Negative.    Skin: Negative.    Allergic/Immunologic: Negative.    Neurological: Negative.    Hematological: Negative.    Psychiatric/Behavioral: Negative.          REVIEWED INFORMATION      Current Outpatient Medications   Medication Sig Dispense Refill    rizatriptan (MAXALT) 10 MG tablet Take one tablet at onset of migraine. May repeat in 2 hours if needed. Do not exceed 2 doses in a 24 hour period. 12 tablet 2    lisinopril-hydroCHLOROthiazide (PRINZIDE;ZESTORETIC) 20-12.5 MG per tablet Take 2 tablets by mouth daily 180 tablet 1    pantoprazole (PROTONIX) 40 MG tablet Take 1 tablet by mouth 2 times daily (before meals) 90 tablet 1    Atogepant (QULIPTA) 60 MG TABS Take 1 tablet by mouth daily 30 tablet 5    Onabotulinumtoxin A (BOTOX) 200 units injection Inject IM into 31 FDA approved sites on head, face and neck every 90 days 1 each 4    atorvastatin (LIPITOR) 40 MG tablet Take 1 tablet by mouth daily 90 tablet 3    diclofenac sodium (VOLTAREN) 1 % GEL Apply 4 g topically 4 times daily as needed for Pain 200 g 0    tiZANidine (ZANAFLEX) 4 MG tablet Take 1 tablet by mouth 3 times daily as needed (back pain) 30 tablet 0     No current

## 2024-11-18 DIAGNOSIS — M19.172 POST-TRAUMATIC ARTHRITIS OF LEFT ANKLE: ICD-10-CM

## 2024-11-18 DIAGNOSIS — Z98.890 STATUS POST SURGERY: Primary | ICD-10-CM

## 2024-11-18 DIAGNOSIS — Z96.652 S/P TOTAL KNEE ARTHROPLASTY, LEFT: ICD-10-CM

## 2024-11-22 RX ORDER — RIZATRIPTAN BENZOATE 10 MG/1
TABLET ORAL
Qty: 12 TABLET | Refills: 2 | OUTPATIENT
Start: 2024-11-22

## 2024-11-26 ENCOUNTER — HOSPITAL ENCOUNTER (OUTPATIENT)
Age: 64
Setting detail: SURGERY ADMIT
Discharge: HOME | End: 2024-11-26
Attending: ORTHOPAEDIC SURGERY | Admitting: ORTHOPAEDIC SURGERY
Payer: COMMERCIAL

## 2024-11-26 ENCOUNTER — APPOINTMENT (OUTPATIENT)
Dept: GENERAL RADIOLOGY | Age: 64
End: 2024-11-26
Attending: ORTHOPAEDIC SURGERY
Payer: COMMERCIAL

## 2024-11-26 ENCOUNTER — ANESTHESIA (OUTPATIENT)
Dept: OPERATING ROOM | Age: 64
End: 2024-11-26
Payer: COMMERCIAL

## 2024-11-26 VITALS
TEMPERATURE: 97.3 F | DIASTOLIC BLOOD PRESSURE: 77 MMHG | OXYGEN SATURATION: 97 % | BODY MASS INDEX: 28.23 KG/M2 | WEIGHT: 220 LBS | RESPIRATION RATE: 18 BRPM | SYSTOLIC BLOOD PRESSURE: 130 MMHG | HEART RATE: 82 BPM | HEIGHT: 74 IN

## 2024-11-26 DIAGNOSIS — Z96.652 PRESENCE OF LEFT ARTIFICIAL KNEE JOINT: ICD-10-CM

## 2024-11-26 DIAGNOSIS — G89.18 POST-OP PAIN: Primary | ICD-10-CM

## 2024-11-26 PROCEDURE — 2580000003 HC RX 258: Performed by: ANESTHESIOLOGY

## 2024-11-26 PROCEDURE — 87205 SMEAR GRAM STAIN: CPT

## 2024-11-26 PROCEDURE — 7100000000 HC PACU RECOVERY - FIRST 15 MIN: Performed by: ORTHOPAEDIC SURGERY

## 2024-11-26 PROCEDURE — 6370000000 HC RX 637 (ALT 250 FOR IP): Performed by: ANESTHESIOLOGY

## 2024-11-26 PROCEDURE — 6360000002 HC RX W HCPCS: Performed by: ANESTHESIOLOGY

## 2024-11-26 PROCEDURE — 7100000011 HC PHASE II RECOVERY - ADDTL 15 MIN: Performed by: ORTHOPAEDIC SURGERY

## 2024-11-26 PROCEDURE — 88332 PATH CONSLTJ SURG EA ADD BLK: CPT

## 2024-11-26 PROCEDURE — 2709999900 HC NON-CHARGEABLE SUPPLY: Performed by: ORTHOPAEDIC SURGERY

## 2024-11-26 PROCEDURE — 97162 PT EVAL MOD COMPLEX 30 MIN: CPT

## 2024-11-26 PROCEDURE — C1713 ANCHOR/SCREW BN/BN,TIS/BN: HCPCS | Performed by: ORTHOPAEDIC SURGERY

## 2024-11-26 PROCEDURE — 87075 CULTR BACTERIA EXCEPT BLOOD: CPT

## 2024-11-26 PROCEDURE — 2720000010 HC SURG SUPPLY STERILE: Performed by: ORTHOPAEDIC SURGERY

## 2024-11-26 PROCEDURE — 2500000003 HC RX 250 WO HCPCS: Performed by: NURSE ANESTHETIST, CERTIFIED REGISTERED

## 2024-11-26 PROCEDURE — 97530 THERAPEUTIC ACTIVITIES: CPT

## 2024-11-26 PROCEDURE — 73562 X-RAY EXAM OF KNEE 3: CPT

## 2024-11-26 PROCEDURE — 87070 CULTURE OTHR SPECIMN AEROBIC: CPT

## 2024-11-26 PROCEDURE — 27487 REVISE/REPLACE KNEE JOINT: CPT | Performed by: ORTHOPAEDIC SURGERY

## 2024-11-26 PROCEDURE — 6360000002 HC RX W HCPCS: Performed by: NURSE ANESTHETIST, CERTIFIED REGISTERED

## 2024-11-26 PROCEDURE — 87176 TISSUE HOMOGENIZATION CULTR: CPT

## 2024-11-26 PROCEDURE — 7100000010 HC PHASE II RECOVERY - FIRST 15 MIN: Performed by: ORTHOPAEDIC SURGERY

## 2024-11-26 PROCEDURE — C1776 JOINT DEVICE (IMPLANTABLE): HCPCS | Performed by: ORTHOPAEDIC SURGERY

## 2024-11-26 PROCEDURE — 6360000002 HC RX W HCPCS: Performed by: ORTHOPAEDIC SURGERY

## 2024-11-26 PROCEDURE — 3700000000 HC ANESTHESIA ATTENDED CARE: Performed by: ORTHOPAEDIC SURGERY

## 2024-11-26 PROCEDURE — 3600000005 HC SURGERY LEVEL 5 BASE: Performed by: ORTHOPAEDIC SURGERY

## 2024-11-26 PROCEDURE — 2500000003 HC RX 250 WO HCPCS: Performed by: ANESTHESIOLOGY

## 2024-11-26 PROCEDURE — 88305 TISSUE EXAM BY PATHOLOGIST: CPT

## 2024-11-26 PROCEDURE — 3600000015 HC SURGERY LEVEL 5 ADDTL 15MIN: Performed by: ORTHOPAEDIC SURGERY

## 2024-11-26 PROCEDURE — 97116 GAIT TRAINING THERAPY: CPT

## 2024-11-26 PROCEDURE — 97535 SELF CARE MNGMENT TRAINING: CPT

## 2024-11-26 PROCEDURE — 73560 X-RAY EXAM OF KNEE 1 OR 2: CPT

## 2024-11-26 PROCEDURE — 97166 OT EVAL MOD COMPLEX 45 MIN: CPT

## 2024-11-26 PROCEDURE — 7100000001 HC PACU RECOVERY - ADDTL 15 MIN: Performed by: ORTHOPAEDIC SURGERY

## 2024-11-26 PROCEDURE — 3700000001 HC ADD 15 MINUTES (ANESTHESIA): Performed by: ORTHOPAEDIC SURGERY

## 2024-11-26 PROCEDURE — 88331 PATH CONSLTJ SURG 1 BLK 1SPC: CPT

## 2024-11-26 DEVICE — IMPLANTABLE DEVICE
Type: IMPLANTABLE DEVICE | Site: KNEE | Status: FUNCTIONAL
Brand: BIOMET® BONE CEMENT R

## 2024-11-26 DEVICE — IMPLANTABLE DEVICE
Type: IMPLANTABLE DEVICE | Site: KNEE | Status: FUNCTIONAL
Brand: PERSONA®

## 2024-11-26 DEVICE — IMPLANTABLE DEVICE
Type: IMPLANTABLE DEVICE | Site: KNEE | Status: FUNCTIONAL
Brand: PERSONA® VIVACIT-E®

## 2024-11-26 DEVICE — SCREW BNE HEX HD 3.5 MM: Type: IMPLANTABLE DEVICE | Site: KNEE | Status: FUNCTIONAL

## 2024-11-26 RX ORDER — ASPIRIN 81 MG/1
81 TABLET ORAL 2 TIMES DAILY
Status: CANCELLED | OUTPATIENT
Start: 2024-11-27

## 2024-11-26 RX ORDER — OXYCODONE HYDROCHLORIDE 5 MG/1
5 TABLET ORAL EVERY 4 HOURS PRN
Status: CANCELLED | OUTPATIENT
Start: 2024-11-26

## 2024-11-26 RX ORDER — DOCUSATE SODIUM 100 MG/1
100 CAPSULE, LIQUID FILLED ORAL 2 TIMES DAILY PRN
Qty: 20 CAPSULE | Refills: 0 | Status: SHIPPED | OUTPATIENT
Start: 2024-11-26

## 2024-11-26 RX ORDER — ASPIRIN 81 MG/1
81 TABLET ORAL 2 TIMES DAILY
Qty: 84 TABLET | Refills: 0 | Status: SHIPPED | OUTPATIENT
Start: 2024-11-26 | End: 2025-01-07

## 2024-11-26 RX ORDER — ACETAMINOPHEN 325 MG/1
650 TABLET ORAL EVERY 6 HOURS
Status: CANCELLED | OUTPATIENT
Start: 2024-11-26

## 2024-11-26 RX ORDER — PHENYLEPHRINE HCL IN 0.9% NACL 1 MG/10 ML
SYRINGE (ML) INTRAVENOUS
Status: DISCONTINUED | OUTPATIENT
Start: 2024-11-26 | End: 2024-11-26 | Stop reason: SDUPTHER

## 2024-11-26 RX ORDER — NALOXONE HYDROCHLORIDE 0.4 MG/ML
INJECTION, SOLUTION INTRAMUSCULAR; INTRAVENOUS; SUBCUTANEOUS PRN
Status: DISCONTINUED | OUTPATIENT
Start: 2024-11-26 | End: 2024-11-26 | Stop reason: HOSPADM

## 2024-11-26 RX ORDER — PROPOFOL 10 MG/ML
INJECTION, EMULSION INTRAVENOUS
Status: DISCONTINUED | OUTPATIENT
Start: 2024-11-26 | End: 2024-11-26 | Stop reason: SDUPTHER

## 2024-11-26 RX ORDER — CEFAZOLIN SODIUM/WATER 2 G/20 ML
2000 SYRINGE (ML) INTRAVENOUS EVERY 8 HOURS
Status: CANCELLED | OUTPATIENT
Start: 2024-11-26 | End: 2024-11-27

## 2024-11-26 RX ORDER — FENTANYL CITRATE 50 UG/ML
25 INJECTION, SOLUTION INTRAMUSCULAR; INTRAVENOUS EVERY 5 MIN PRN
Status: DISCONTINUED | OUTPATIENT
Start: 2024-11-26 | End: 2024-11-26 | Stop reason: HOSPADM

## 2024-11-26 RX ORDER — MEPERIDINE HYDROCHLORIDE 50 MG/ML
12.5 INJECTION INTRAMUSCULAR; INTRAVENOUS; SUBCUTANEOUS EVERY 5 MIN PRN
Status: DISCONTINUED | OUTPATIENT
Start: 2024-11-26 | End: 2024-11-26 | Stop reason: HOSPADM

## 2024-11-26 RX ORDER — SODIUM CHLORIDE 0.9 % (FLUSH) 0.9 %
5-40 SYRINGE (ML) INJECTION PRN
Status: DISCONTINUED | OUTPATIENT
Start: 2024-11-26 | End: 2024-11-26 | Stop reason: HOSPADM

## 2024-11-26 RX ORDER — GABAPENTIN 300 MG/1
300 CAPSULE ORAL ONCE
Status: COMPLETED | OUTPATIENT
Start: 2024-11-26 | End: 2024-11-26

## 2024-11-26 RX ORDER — METOCLOPRAMIDE HYDROCHLORIDE 5 MG/ML
10 INJECTION INTRAMUSCULAR; INTRAVENOUS
Status: DISCONTINUED | OUTPATIENT
Start: 2024-11-26 | End: 2024-11-26 | Stop reason: HOSPADM

## 2024-11-26 RX ORDER — OXYCODONE HYDROCHLORIDE 5 MG/1
10 TABLET ORAL EVERY 4 HOURS PRN
Status: CANCELLED | OUTPATIENT
Start: 2024-11-26

## 2024-11-26 RX ORDER — DIPHENHYDRAMINE HYDROCHLORIDE 50 MG/ML
12.5 INJECTION INTRAMUSCULAR; INTRAVENOUS
Status: DISCONTINUED | OUTPATIENT
Start: 2024-11-26 | End: 2024-11-26 | Stop reason: HOSPADM

## 2024-11-26 RX ORDER — SODIUM CHLORIDE 9 MG/ML
INJECTION, SOLUTION INTRAVENOUS PRN
Status: DISCONTINUED | OUTPATIENT
Start: 2024-11-26 | End: 2024-11-26 | Stop reason: HOSPADM

## 2024-11-26 RX ORDER — OXYCODONE AND ACETAMINOPHEN 5; 325 MG/1; MG/1
1-2 TABLET ORAL EVERY 6 HOURS PRN
Qty: 56 TABLET | Refills: 0 | Status: SHIPPED | OUTPATIENT
Start: 2024-11-26 | End: 2024-12-03

## 2024-11-26 RX ORDER — MIDAZOLAM HYDROCHLORIDE 1 MG/ML
INJECTION, SOLUTION INTRAMUSCULAR; INTRAVENOUS
Status: DISCONTINUED | OUTPATIENT
Start: 2024-11-26 | End: 2024-11-26 | Stop reason: SDUPTHER

## 2024-11-26 RX ORDER — FENTANYL CITRATE 50 UG/ML
INJECTION, SOLUTION INTRAMUSCULAR; INTRAVENOUS
Status: DISCONTINUED | OUTPATIENT
Start: 2024-11-26 | End: 2024-11-26 | Stop reason: SDUPTHER

## 2024-11-26 RX ORDER — SODIUM CHLORIDE, SODIUM LACTATE, POTASSIUM CHLORIDE, CALCIUM CHLORIDE 600; 310; 30; 20 MG/100ML; MG/100ML; MG/100ML; MG/100ML
INJECTION, SOLUTION INTRAVENOUS CONTINUOUS
Status: DISCONTINUED | OUTPATIENT
Start: 2024-11-26 | End: 2024-11-26 | Stop reason: HOSPADM

## 2024-11-26 RX ORDER — SODIUM CHLORIDE 9 MG/ML
INJECTION, SOLUTION INTRAVENOUS PRN
Status: CANCELLED | OUTPATIENT
Start: 2024-11-26

## 2024-11-26 RX ORDER — SODIUM CHLORIDE 9 MG/ML
INJECTION, SOLUTION INTRAVENOUS CONTINUOUS
Status: CANCELLED | OUTPATIENT
Start: 2024-11-26

## 2024-11-26 RX ORDER — ONDANSETRON 2 MG/ML
INJECTION INTRAMUSCULAR; INTRAVENOUS
Status: DISCONTINUED | OUTPATIENT
Start: 2024-11-26 | End: 2024-11-26 | Stop reason: SDUPTHER

## 2024-11-26 RX ORDER — SODIUM CHLORIDE 0.9 % (FLUSH) 0.9 %
5-40 SYRINGE (ML) INJECTION EVERY 12 HOURS SCHEDULED
Status: DISCONTINUED | OUTPATIENT
Start: 2024-11-26 | End: 2024-11-26 | Stop reason: HOSPADM

## 2024-11-26 RX ORDER — CELECOXIB 200 MG/1
200 CAPSULE ORAL ONCE
Status: COMPLETED | OUTPATIENT
Start: 2024-11-26 | End: 2024-11-26

## 2024-11-26 RX ORDER — TRANEXAMIC ACID 100 MG/ML
INJECTION, SOLUTION INTRAVENOUS
Status: DISCONTINUED | OUTPATIENT
Start: 2024-11-26 | End: 2024-11-26 | Stop reason: SDUPTHER

## 2024-11-26 RX ORDER — BISACODYL 5 MG/1
5 TABLET, DELAYED RELEASE ORAL DAILY PRN
Status: CANCELLED | OUTPATIENT
Start: 2024-11-26

## 2024-11-26 RX ORDER — PROCHLORPERAZINE EDISYLATE 5 MG/ML
10 INJECTION INTRAMUSCULAR; INTRAVENOUS
Status: DISCONTINUED | OUTPATIENT
Start: 2024-11-26 | End: 2024-11-26 | Stop reason: HOSPADM

## 2024-11-26 RX ORDER — SODIUM CHLORIDE 9 MG/ML
INJECTION, SOLUTION INTRAVENOUS CONTINUOUS
Status: DISCONTINUED | OUTPATIENT
Start: 2024-11-26 | End: 2024-11-26 | Stop reason: HOSPADM

## 2024-11-26 RX ORDER — SODIUM CHLORIDE 0.9 % (FLUSH) 0.9 %
5-40 SYRINGE (ML) INJECTION PRN
Status: CANCELLED | OUTPATIENT
Start: 2024-11-26

## 2024-11-26 RX ORDER — HYDROMORPHONE HYDROCHLORIDE 1 MG/ML
0.5 INJECTION, SOLUTION INTRAMUSCULAR; INTRAVENOUS; SUBCUTANEOUS EVERY 5 MIN PRN
Status: DISCONTINUED | OUTPATIENT
Start: 2024-11-26 | End: 2024-11-26 | Stop reason: HOSPADM

## 2024-11-26 RX ORDER — ONDANSETRON 2 MG/ML
4 INJECTION INTRAMUSCULAR; INTRAVENOUS EVERY 6 HOURS PRN
Status: CANCELLED | OUTPATIENT
Start: 2024-11-26

## 2024-11-26 RX ORDER — ONDANSETRON 4 MG/1
4 TABLET, ORALLY DISINTEGRATING ORAL EVERY 8 HOURS PRN
Status: CANCELLED | OUTPATIENT
Start: 2024-11-26

## 2024-11-26 RX ORDER — SODIUM CHLORIDE 0.9 % (FLUSH) 0.9 %
5-40 SYRINGE (ML) INJECTION EVERY 12 HOURS SCHEDULED
Status: CANCELLED | OUTPATIENT
Start: 2024-11-26

## 2024-11-26 RX ORDER — OXYCODONE HYDROCHLORIDE 5 MG/1
5 TABLET ORAL
Status: COMPLETED | OUTPATIENT
Start: 2024-11-26 | End: 2024-11-26

## 2024-11-26 RX ORDER — LIDOCAINE HYDROCHLORIDE 20 MG/ML
INJECTION, SOLUTION EPIDURAL; INFILTRATION; INTRACAUDAL; PERINEURAL
Status: DISCONTINUED | OUTPATIENT
Start: 2024-11-26 | End: 2024-11-26 | Stop reason: SDUPTHER

## 2024-11-26 RX ORDER — VANCOMYCIN HYDROCHLORIDE 1 G/20ML
INJECTION, POWDER, LYOPHILIZED, FOR SOLUTION INTRAVENOUS PRN
Status: DISCONTINUED | OUTPATIENT
Start: 2024-11-26 | End: 2024-11-26 | Stop reason: ALTCHOICE

## 2024-11-26 RX ORDER — DEXAMETHASONE SODIUM PHOSPHATE 10 MG/ML
INJECTION, SOLUTION INTRAMUSCULAR; INTRAVENOUS
Status: DISCONTINUED | OUTPATIENT
Start: 2024-11-26 | End: 2024-11-26 | Stop reason: SDUPTHER

## 2024-11-26 RX ORDER — CEFAZOLIN SODIUM/WATER 2 G/20 ML
2000 SYRINGE (ML) INTRAVENOUS ONCE
Status: COMPLETED | OUTPATIENT
Start: 2024-11-26 | End: 2024-11-26

## 2024-11-26 RX ORDER — ACETAMINOPHEN 500 MG
1000 TABLET ORAL ONCE
Status: COMPLETED | OUTPATIENT
Start: 2024-11-26 | End: 2024-11-26

## 2024-11-26 RX ORDER — LIDOCAINE HYDROCHLORIDE 10 MG/ML
1 INJECTION, SOLUTION EPIDURAL; INFILTRATION; INTRACAUDAL; PERINEURAL
Status: DISCONTINUED | OUTPATIENT
Start: 2024-11-27 | End: 2024-11-26 | Stop reason: HOSPADM

## 2024-11-26 RX ADMIN — Medication 2000 MG: at 11:20

## 2024-11-26 RX ADMIN — FENTANYL CITRATE 50 MCG: 50 INJECTION, SOLUTION INTRAMUSCULAR; INTRAVENOUS at 11:41

## 2024-11-26 RX ADMIN — CELECOXIB 200 MG: 200 CAPSULE ORAL at 09:13

## 2024-11-26 RX ADMIN — HYDROMORPHONE HYDROCHLORIDE 0.5 MG: 1 INJECTION, SOLUTION INTRAMUSCULAR; INTRAVENOUS; SUBCUTANEOUS at 13:30

## 2024-11-26 RX ADMIN — Medication 100 MCG: at 12:30

## 2024-11-26 RX ADMIN — LIDOCAINE HYDROCHLORIDE 80 MG: 20 INJECTION, SOLUTION EPIDURAL; INFILTRATION; INTRACAUDAL; PERINEURAL at 11:09

## 2024-11-26 RX ADMIN — GABAPENTIN 300 MG: 300 CAPSULE ORAL at 09:13

## 2024-11-26 RX ADMIN — OXYCODONE HYDROCHLORIDE 5 MG: 5 TABLET ORAL at 13:52

## 2024-11-26 RX ADMIN — FAMOTIDINE 20 MG: 10 INJECTION, SOLUTION INTRAVENOUS at 09:44

## 2024-11-26 RX ADMIN — Medication 100 MCG: at 12:11

## 2024-11-26 RX ADMIN — Medication 50 MCG: at 11:32

## 2024-11-26 RX ADMIN — Medication 200 MCG: at 13:01

## 2024-11-26 RX ADMIN — FENTANYL CITRATE 50 MCG: 50 INJECTION, SOLUTION INTRAMUSCULAR; INTRAVENOUS at 12:20

## 2024-11-26 RX ADMIN — TRANEXAMIC ACID 1000 MG: 100 INJECTION, SOLUTION INTRAVENOUS at 11:25

## 2024-11-26 RX ADMIN — DEXAMETHASONE SODIUM PHOSPHATE 10 MG: 10 INJECTION INTRAMUSCULAR; INTRAVENOUS at 11:16

## 2024-11-26 RX ADMIN — ACETAMINOPHEN 1000 MG: 500 TABLET ORAL at 09:13

## 2024-11-26 RX ADMIN — TRANEXAMIC ACID 1000 MG: 100 INJECTION, SOLUTION INTRAVENOUS at 13:06

## 2024-11-26 RX ADMIN — FENTANYL CITRATE 50 MCG: 50 INJECTION, SOLUTION INTRAMUSCULAR; INTRAVENOUS at 11:46

## 2024-11-26 RX ADMIN — SODIUM CHLORIDE: 9 INJECTION, SOLUTION INTRAVENOUS at 12:33

## 2024-11-26 RX ADMIN — HYDROMORPHONE HYDROCHLORIDE 0.5 MG: 1 INJECTION, SOLUTION INTRAMUSCULAR; INTRAVENOUS; SUBCUTANEOUS at 13:45

## 2024-11-26 RX ADMIN — Medication 200 MCG: at 12:45

## 2024-11-26 RX ADMIN — Medication 100 MCG: at 12:25

## 2024-11-26 RX ADMIN — ONDANSETRON 4 MG: 2 INJECTION INTRAMUSCULAR; INTRAVENOUS at 13:06

## 2024-11-26 RX ADMIN — Medication 100 MCG: at 12:07

## 2024-11-26 RX ADMIN — FENTANYL CITRATE 100 MCG: 50 INJECTION, SOLUTION INTRAMUSCULAR; INTRAVENOUS at 11:09

## 2024-11-26 RX ADMIN — Medication 50 MCG: at 11:37

## 2024-11-26 RX ADMIN — MIDAZOLAM 2 MG: 1 INJECTION INTRAMUSCULAR; INTRAVENOUS at 11:03

## 2024-11-26 RX ADMIN — SODIUM CHLORIDE: 9 INJECTION, SOLUTION INTRAVENOUS at 09:12

## 2024-11-26 RX ADMIN — PROPOFOL 200 MG: 10 INJECTION, EMULSION INTRAVENOUS at 11:09

## 2024-11-26 ASSESSMENT — PAIN DESCRIPTION - ORIENTATION
ORIENTATION: LEFT

## 2024-11-26 ASSESSMENT — PAIN DESCRIPTION - DESCRIPTORS
DESCRIPTORS: PATIENT UNABLE TO DESCRIBE
DESCRIPTORS: PATIENT UNABLE TO DESCRIBE
DESCRIPTORS: ACHING;BURNING
DESCRIPTORS: ACHING

## 2024-11-26 ASSESSMENT — PAIN DESCRIPTION - LOCATION
LOCATION: KNEE

## 2024-11-26 ASSESSMENT — PAIN DESCRIPTION - PAIN TYPE
TYPE: SURGICAL PAIN

## 2024-11-26 ASSESSMENT — PAIN - FUNCTIONAL ASSESSMENT
PAIN_FUNCTIONAL_ASSESSMENT: 0-10
PAIN_FUNCTIONAL_ASSESSMENT: 0-10

## 2024-11-26 ASSESSMENT — PAIN SCALES - GENERAL
PAINLEVEL_OUTOF10: 9
PAINLEVEL_OUTOF10: 5
PAINLEVEL_OUTOF10: 9
PAINLEVEL_OUTOF10: 0
PAINLEVEL_OUTOF10: 10

## 2024-11-26 ASSESSMENT — PAIN DESCRIPTION - FREQUENCY
FREQUENCY: CONTINUOUS

## 2024-11-26 NOTE — DISCHARGE INSTRUCTIONS
ANY ORTHOPEDIC QUESTIONS OR ANY OTHER CONCERNS YOU MAY CALL THE ORTHOPEDIC COORDINATOR:  GABRIELA WOLF RN, MSN  [phone]  [email or web address]    DISCHARGE INSTRUCTIONS  Caring for yourself after joint replacement surgery (Total Hip and Total Knee Replacement)    Activity and Therapy  Receive physical therapy three times per week. (Pain medication one hour prior to therapy)   Perform PT exercises on own when not receiving home or outpatient PT.  Ideally exercises should be at least two times a day.  Increase level of activity and ambulation each day.  Perform deep breathing exercises daily.  Patient provides self-care when possible.  Work on Range of motion for Total knee patients.   No pillow under the knee for Total knee patients.  Elevate the surgical leg when seated.  No driving until cleared by surgeon      Diet:  Increase oral intake of fruits, fiber and water to prevent constipation.  Drink fluids frequently and take stool softeners to aid in bowel motility.  Increase protein intake/reduce high-sugar intake to help promote healing and prevent infection.    Incision Care:  Keep Primaseal or other dressing intact and remove as directed by surgeon, unless saturated, in which case, call surgeon and request instructions.  If dressing falls off, call surgeon.  Rich Hose on in the am and off in the pm to reduce swelling.  Ice affected area four times a day, for twenty minutes.    Pain Medications and Anticoagulant  You have been place on an anticoagulant to prevent blood clots.  Take this medication exactly as prescribed.  Be alert for signs of bleeding.  Take care not to injure yourself.  You have been provided pain medicine to control your pain.  Do not take more narcotics than prescribed.  You may begin weaning from narcotics as your pain level improves by decreasing the amount or frequency of the narcotics.  You may also take plain acetaminophen as an alternate to the narcotics.   Never exceed the  pool, or hot tub until your incision is fully closed and any drains are removed.  Don’t scrub, pick, scratch, or pull at your incision.  Don’t use oils, lotions, or creams on your incision unless your healthcare provider approves it.    Follow-up  You will have one or more follow-up visits with your healthcare provider. These are needed to check how well you’re healing. Your drain, stitches, or staples may also be removed during these visits. Do not miss your follow-up visit, even if you are feeling better.      Call your healthcare provider right away if you have the following:  Fever of 100.4°F (38°C) or higher, or as advised by your healthcare provider.  Chest pain or trouble breathing.  Pain or tenderness in your leg(s).  Increased pain, redness, swelling, bleeding, or foul-smelling drainage at the incision site.  Incision changes, separates or is hot to the touch.  Problems with the drain if you have one.  Itchy, swollen skin; skin rash.    Medicines, Diet, and Activity:   Refer to your discharge paperwork for further instructions

## 2024-11-26 NOTE — H&P
Interval H&P Note    Pt Name: Giovanny Hendricks  MRN: [id number]  YOB: 1960  Date of evaluation: 11/26/2024      [x] I have reviewed in Epic the H&P by HONG Eaton CNP dated 10/30/2024, attached below for an Interval History and Physical note.     [x] I have examined  Giovanny Hendricks, a 64 y.o. male.There are no changes to the patient who is scheduled for LEFT KNEE TOTAL ARTHROPLASTY REVISION by Rizwan Adkins DO for Presence of left artificial knee joint. The patient denies new health changes, fever, chills, wheezing, cough, increased SOB, chest pain, open sores or wounds. Denies hx of diabetes. Denies any current blood thinning medications.     Patient received medical clearance for planned procedure.     Vital signs: BP (!) 130/96   Pulse 72   Temp 97 °F (36.1 °C)   Resp 18   Ht 1.88 m (6' 2\")   Wt 99.8 kg (220 lb)   SpO2 100%   BMI 28.25 kg/m²     Allergies:  Patient has no known allergies.    Medications:    Prior to Admission medications    Medication Sig Start Date End Date Taking? Authorizing Provider   rizatriptan (MAXALT) 10 MG tablet Take one tablet at onset of migraine. May repeat in 2 hours if needed. Do not exceed 2 doses in a 24 hour period. 11/13/24  Yes Antelmo Quesada MD   lisinopril-hydroCHLOROthiazide (PRINZIDE;ZESTORETIC) 20-12.5 MG per tablet Take 2 tablets by mouth daily 10/17/24  Yes Stanton Martinez DO   pantoprazole (PROTONIX) 40 MG tablet Take 1 tablet by mouth 2 times daily (before meals) 10/10/24  Yes Osorio Erwin MD   Atogepant (QULIPTA) 60 MG TABS Take 1 tablet by mouth daily 7/15/24  Yes Antelmo Quesada MD   Onabotulinumtoxin A (BOTOX) 200 units injection Inject IM into 31 FDA approved sites on head, face and neck every 90 days 12/20/23  Yes Nyasia Alarcon MD   atorvastatin (LIPITOR) 40 MG tablet Take 1 tablet by mouth daily 11/20/23  Yes Martinez, Stanton Michael, DO   diclofenac sodium (VOLTAREN) 1 % GEL Apply 4 g topically 4 times daily as

## 2024-11-26 NOTE — PROGRESS NOTES
Orthopedic Coordinator Note    Patient s/p left total Knee replacement WITH POSSIBLE ATB SPACER on 11/26/2024 WITH DR. CHILD.    The following appointments are currently scheduled:    Post-op with surgeon 12/09/2024 AT [address] WITH OZIEL GRANDA.    Physical Therapy OPPT AT [facility] 11/269/2024 A T10:45AM.    PT HAS A FWW.    DVT Prophylaxis:   81MG ASA BID X 6 WEEKS POSTOP.    PT CAME TO JOINT CLASS 10/30/2024.    PT IS A SDD IF NO ATB SPACER PLACED.    Any questions please contact Gabriela Cisneros RN, MSN  [phone]      Electronically signed by: GABRIELA CISNEROS RN on 11/26/2024 at 9:37 AM

## 2024-11-26 NOTE — OP NOTE
COMPONENT FEM AUG 11 5 MM DSTL PERSONA - [id number]  COMPONENT FEM AUG 11 5 MM DSTL PERSONA  [employer] [id number] Left 1 Implanted   COMPONENT FEM AUG 11 5 MM DSTL PERSONA - [id number]  COMPONENT FEM AUG 11 5 MM DSTL PERSONA  [employer]WD [id number] Left 1 Implanted   COMPONENT FEM DIOMEDES 11 STD LT KNEE REV COCR PERSONA - [id number]  COMPONENT FEM DIOMEDES 11 STD LT KNEE REV COCR PERSONA  [employer]WD [id number] Left 1 Implanted   PSN REV STRAIGHT SPLINE STEM EXT [id number] - [id number]  PSN REV STRAIGHT SPLINE STEM EXT [id number]  [employer] [id number] Left 1 Implanted         Drains: * No LDAs found *    Findings:  Infection Present At Time Of Surgery (PATOS) (choose all levels that have infection present):  No infection present  Other Findings: 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  Patient underwent total knee arthroplasty approximately 16 years ago.  Over the last 1 year has had progressively worsening pain which has caused decreased ability to do activities as desired.  For 15 years he had no pain was able to do almost everything.  He denies fevers, chills, nausea, vomiting, diarrhea.  Persistent pain and persistent swelling is causing significant debility and as a result the patient would like to undergo revision arthroplasty at this time.    Patient was identified preoperatively where consent was obtained, signed, placed on the chart.  The procedure was described.  His questions were answered.  All details of the procedure, as well as risks, benefits and alternatives, including the option of non operative versus operative treatment were discussed.  The patient understands that risks of the surgery include but are not limited to: bleeding,  the tibia was removed using osteotomes.  The polyethylene showed somewhere along the medial lateral aspect of the knee with some eburnation of the poly-.    Attention was then drawn to removal of the femoral and tibial components which was done utilizing a jiggly saw, micro saw, osteotomes.  Both the tibia and femoral components were removed without much bone loss and again no signs of infection were appreciated.    Sequential reaming of the femoral and tibial canals was done to the appropriately sized reamers and the appropriately sized cutting jig's were placed revising the tibial and the femoral cuts.    No cones were felt to be necessary.    The trial components were then assembled on the back table and impacted until fully seated.  Trial polyethylene was placed and the knee was put through range of motion was found to be stable throughout the arc of range of motion.  Portable x-rays were taken showing components in the appropriate position.    Trials were removed and all bony cuts were thoroughly irrigated and suction.    The joint capsule, surrounding joints periosteum, deep tissue, subcutaneous tissue were injected with the following local anesthesia mixture/cocktail:   20 mL Exparel, epinephrine 1:1000 0.3 mg, morphine 10 mg, ketorolac 15 mg, ketamine 50 mg, bupivacaine PF 0.5% 150 mg, normal saline 60 mL.    It should be noted that some cavitary defects in the posterior aspect of the lateral femoral condyle was appreciated from osteolysis.    5 mm augments were used on the distal femur.    The implantable components were assembled on the back table.  2 bags of cement were mixed on the back table and the bony cuts were pressurized with cement.  The implants were then placed and impacted until fully seated and marginal cement from the periphery of the implants was removed during the pressurization process.    The implantable polyethylene was placed and impacted until fully seated and the knee was held in full

## 2024-11-26 NOTE — BRIEF OP NOTE
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       Procedure(s):  LEFT KNEE TOTAL ARTHROPLASTY REVISION    Surgeon(s):  Rizwan Adkins DO    Assistant:  Surgical Assistant: Alice Keen  Resident: Javi Romero MD    Anesthesia: General    Estimated Blood Loss (mL): 150    Complications: None    Specimens:   ID Type Source Tests Collected by Time Destination   1 : left total knee culture Tissue Joint, Knee CULTURE, TISSUE, CULTURE, ANAEROBIC AND AEROBIC Rizwan Adkins,  11/26/2024 1143    A : Left total knee tissue #1 Tissue Joint, Knee SURGICAL PATHOLOGY Rizwan Adkins,  11/26/2024 1139    B : Left total knee tissue #2 Tissue Joint, Knee SURGICAL PATHOLOGY Rizwan Adkins,  11/26/2024 1140    C : Left total knee tissue #3 Tissue Joint, Knee SURGICAL PATHOLOGY Rizwan Adkins,  11/26/2024 1140    D : Left total knee tissue #4 Tissue Joint, Knee SURGICAL PATHOLOGY Rizwan Adkins,  11/26/2024 1141        Implants:  Implant Name Type Inv. Item Serial No.  Lot No. LRB No. Used Action   CEMENT BNE 40GM HI VISC RADPQ FOR REV SURG - [id number]  CEMENT BNE 40GM HI VISC RADPQ FOR REV SURG  [facility] [id number] Left 2 Implanted   PSN REV 3MM OFFSET STEM EXT [id number] - [id number]  PSN REV 3MM OFFSET STEM EXT [id number]  [employer]- [id number] Left 1 Implanted   PSN REV TIB FIXED KEEL CMT SZ G L - [id number]  PSN REV TIB FIXED KEEL CMT SZ G L  [facility] [id number] Left 1 Implanted   SURFACE ARTC TIB GH FEM 10-12 H12MM LT KNEE VIVACIT-E - [id number]  SURFACE ARTC TIB GH FEM 10-12 H12MM LT KNEE VIVACIT-E  [facility] [id number] Left 1 Implanted    COMPONENT FEM AUG 11 5 MM DSTL PERSONA - [id number]  COMPONENT FEM AUG 11 5 MM DSTL PERSONA  [employer]WD [id number] Left 1 Implanted   COMPONENT FEM AUG 11 5 MM DSTL PERSONA - [id number]  COMPONENT FEM AUG 11 5 MM DSTL PERSONA  [employer] [id number] Left 1 Implanted   COMPONENT FEM DIOMEDES 11 STD LT KNEE REV COCR PERSONA - [id number]  COMPONENT FEM DIOMEDES 11 STD LT KNEE REV COCR PERSONA  [employer]WD [id number] Left 1 Implanted   PSN REV STRAIGHT SPLINE STEM EXT [id number] - [id number]  PSN REV STRAIGHT SPLINE STEM EXT [id number]  [employer]WD [id number] Left 1 Implanted         Drains: * No LDAs found *    Findings:  Infection Present At Time Of Surgery (PATOS) (choose all levels that have infection present):  No infection present  Other Findings: 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

## 2024-11-26 NOTE — PROGRESS NOTES
Occupational Therapy  Facility/Department: [facility] OR  Occupational Therapy Initial Assessment    Name: Giovanny Hendricks  : 1960  MRN: [id number]  Date of Service: 2024    MINAL Banegas reports patient is medically stable for therapy treatment this date.    Chart reviewed prior to treatment and patient is agreeable for therapy.  All lines intact and patient positioned comfortably at end of treatment.  All patient needs addressed prior to ending therapy session.      Discharge Recommendations:  Patient would benefit from continued therapy after discharge  Pt presenting with new musculoskeletal dysfunction and would benefit from additional therapy at time of discharge.  Please refer to the AM-PAC score for current functional status.     OT Equipment Recommendations  Equipment Needed: Yes  Mobility Devices: ADL Assistive Devices  ADL Assistive Devices: Emergency Alert System;Long-handled Shoe Horn;Long-handled Sponge;Reacher;Sock-Aid Hard;Transfer Tub Bench       Patient Diagnosis(es): The primary encounter diagnosis was Post-op pain. A diagnosis of Presence of left artificial knee joint was also pertinent to this visit.  Past Medical History:  has a past medical history of COVID-19, Diverticular disease of colon, Hx of blood clots, Hyperlipidemia, Hypertension, Migraines, and Pulmonary emboli (HCC).  Past Surgical History:  has a past surgical history that includes joint replacement (); hernia repair (); sigmoidectomy (2015); colectomy (2015); Revision Colostomy (); hernia repair (2021); ventral hernia repair (N/A, 2021); Upper gastrointestinal endoscopy (N/A, 10/10/2024); Hip fracture surgery (Left); and Ankle fracture surgery.     L TKA - Revision ; Dr. Adkins 24      Assessment  Performance deficits / Impairments: Decreased functional mobility ;Decreased ADL status;Decreased safe awareness;Decreased cognition;Decreased balance;Decreased coordination;Decreased  Daily Activity - Inpatient   How much help is needed for putting on and taking off regular lower body clothing?: A Lot  How much help is needed for bathing (which includes washing, rinsing, drying)?: A Lot  How much help is needed for toileting (which includes using toilet, bedpan, or urinal)?: A Little  How much help is needed for putting on and taking off regular upper body clothing?: A Little  How much help is needed for taking care of personal grooming?: A Little  How much help for eating meals?: None  AM-[facility] Inpatient Daily Activity Raw Score: 17  AM-PAC Inpatient ADL T-Scale Score : 37.26  ADL Inpatient CMS 0-100% Score: 50.11  ADL Inpatient CMS G-Code Modifier : CK           Goals  Short Term Goals  Time Frame for Short Term Goals: By discharge, pt to demo  Short Term Goal 1: ADL transfers and functional mobility to Mod I with use of AD and proper .  Short Term Goal 2: bed mobility to Mod I with use of bedrails as needed.  Short Term Goal 3: UB ADLs to SBA and LB ADLs to Min A with use of AD/AE as needed.  Short Term Goal 4: toileting to SBA with use of AD/grab bars as needed.  Short Term Goal 5: Pt/caregiver to be I with fall prevention edu, EC/WS tech, recommendations for discharge/AE, surgical precautions, FABRICE hose wear/purpose, safe car transfers with use of handouts as needed.  Patient Goals   Patient goals : To go home!      Therapy Time   Individual Concurrent Group Co-treatment   Time In 1424         Time Out 1514         Minutes 50          Tx time: 40 min    Co-treatment with PT warranted first time up day of surgery. Cotx due to potential risk of decreased sensation, muscle control and proprioception from Exparel, spinal epidural and/or regional block. Decreased safety and independence requiring 2 skilled therapy professionals to address individual discipline's goals.          Bita PAT, OT

## 2024-11-26 NOTE — ANESTHESIA POSTPROCEDURE EVALUATION
Department of Anesthesiology  Postprocedure Note    Patient: Giovanny Hendricks  MRN: [id number]  YOB: 1960  Date of evaluation: 11/26/2024    Procedure Summary       Date: 11/26/24 Room / Location: [address]    Anesthesia Start: 1106 Anesthesia Stop: 1325    Procedure: LEFT KNEE TOTAL ARTHROPLASTY REVISION (Left: Knee) Diagnosis:       Presence of left artificial knee joint      (Presence of left artificial knee joint [Z96.652])    Surgeons: Rizwan Adkins DO Responsible Provider: Carlos Gillis DO    Anesthesia Type: General ASA Status: 3            Anesthesia Type: General    Esther Phase I: Esther Score: 10    Esther Phase II: Esther Score: 10    Anesthesia Post Evaluation    Patient location during evaluation: PACU  Patient participation: complete - patient participated  Level of consciousness: awake and alert  Airway patency: patent  Nausea & Vomiting: no nausea and no vomiting  Cardiovascular status: hemodynamically stable  Respiratory status: acceptable  Hydration status: stable  Pain management: adequate    No notable events documented.

## 2024-11-26 NOTE — PLAN OF CARE
PROTOCOLS  NURSING IMPLEMENTED    TOTAL JOINT DVT/PE  VENOUS THROMBOEMBOLISM PROPHYLAXIS  (Nursing Automatically Implement)    Giovanny Hendricks  [id number]  [unfilled]  11/26/24    YES DVT RISK FACTOR SCORE YES MAJOR BLEEDING RISK FACTORS SCORE     [x] 50 years old or greater (1)   [] Hx. Easy Bleeding (1)      [] Heart failure (2)   [] NSAID Use in Last 5 Days (2)      [] Varicose veins - Hx. (1)   [] Gastrointestinal or Genitourinary bleeding in Last 14 Days (2)      [] Myocardial Infarction - Hx. (1)         [] Cancer - Hx. (2)         [] Atrial fibrillation - Hx. (1)         [] Ischemic Stroke - Hx. (1)         [] Diabetes Mellitus - Hx. (1)         [x] Previous DVT/PE - Hx. (2)         [] Hormone Replacement Therapy (1)         [] Obesity (1)         [] Paralysis (1)         [] Pregnancy (1)         [] Smoking (1)                   [] Thromophilia (1)   []   Mild to Moderate Bleeding (2)      [] Total Hip Arthroplasty (1)   [] Active Bleeding (4)      [] Family history of PE or DVT? (4) (Consider the following labs to test for presence of inhibitor deficiency state:) Factor V Leiden, Prothrombin Gene Mutation, Protein S Deficiency, Protien C Deficiency, Antithrombin Deficiency   [] Malignant Hypertension (2)        [] Thrombocytopenia 20k to 100k (2)        [] Thrombocytopenia less than 20k (4)        [] Bleeding Diathesis (4)        [] \"Bloody Stick\" Epidural or Spinal (2)     TOTAL DVT SCORE 3  TOTAL BLEEDING SCORE 0     [x] CLASS A   Standard Risk DVT (0-3)    [x] CLASS X Standard Risk Bleeding (0-4)      [] CLASS B Elevated Risk DVT (greater than 3)    [] CLASS Y High Risk Bleeding (greater than 4)     FINAL MATRIX (e.g. AY)       *If allergic to ASA use Warfarin  *BY patient consider no treatment  **Consider venous filter with high risk PE  **If on Coumadin pre-op, then restart night of surgery      [x]  DVT Prophylaxis: Class AX, AY    Ecotrin 81 mg by mouth BID starting day of surgery for 6 weeks for all

## 2024-11-26 NOTE — ANESTHESIA PRE PROCEDURE
Department of Anesthesiology  Preprocedure Note       Name:  Giovanny Hendricks   Age:  64 y.o.  :  1960                                          MRN:  [id number]         Date:  2024      Surgeon: Surgeon(s):  Rizwan Adkins DO    Procedure: Procedure(s):  LEFT KNEE TOTAL ARTHROPLASTY REVISION    Medications prior to admission:   Prior to Admission medications    Medication Sig Start Date End Date Taking? Authorizing Provider   rizatriptan (MAXALT) 10 MG tablet Take one tablet at onset of migraine. May repeat in 2 hours if needed. Do not exceed 2 doses in a 24 hour period. 24  Yes Antelmo Quesada MD   lisinopril-hydroCHLOROthiazide (PRINZIDE;ZESTORETIC) 20-12.5 MG per tablet Take 2 tablets by mouth daily 10/17/24  Yes Stanton Martinez DO   pantoprazole (PROTONIX) 40 MG tablet Take 1 tablet by mouth 2 times daily (before meals) 10/10/24  Yes Osorio Erwin MD   Atogepant (QULIPTA) 60 MG TABS Take 1 tablet by mouth daily 7/15/24  Yes Antelmo Quesada MD   Onabotulinumtoxin A (BOTOX) 200 units injection Inject IM into 31 FDA approved sites on head, face and neck every 90 days 23  Yes Nyasia Alarcon MD   atorvastatin (LIPITOR) 40 MG tablet Take 1 tablet by mouth daily 23  Yes Stanton Martinez DO   diclofenac sodium (VOLTAREN) 1 % GEL Apply 4 g topically 4 times daily as needed for Pain 22  Yes Rafa Rojas MD   tiZANidine (ZANAFLEX) 4 MG tablet Take 1 tablet by mouth 3 times daily as needed (back pain) 22  Yes Stanton Martinez DO       Current medications:    Current Facility-Administered Medications   Medication Dose Route Frequency Provider Last Rate Last Admin   • [START ON 2024] lidocaine PF 1 % injection 1 mL  1 mL IntraDERmal Once PRN Jovanna Campbell MD       • 0.9 % sodium chloride infusion   IntraVENous Continuous Jovanna Campbell  mL/hr at 24 0912 New Bag at 24 0912   • lactated ringers

## 2024-11-26 NOTE — PROGRESS NOTES
LLE  Comment: Quad set+, glute set+  Strength RUE  Strength RUE: WFL  Strength LUE  Strength LUE: WFL             Bed mobility  Supine to Sit: Stand by assistance  Sit to Supine: Stand by assistance  Scooting: Stand by assistance  Bed Mobility Comments: Pt w/o significant difficulty throughout bed mobility this date however requiring increased time and effort to perform tasks throughout.  Pt using BUE to assist w/ progression of LLE throughout.  Upon sitting at EOB, pt denying any dizziness/lightheadedness.    Transfers  Sit to Stand: Contact guard assistance  Stand to Sit: Contact guard assistance  Comment: Pt performed multiple STS transfers throughout session this date demonstrating fair steadiness throughout. Min-mod verbal cueing required for proper hand placement throughout transfers w/ RW w/ fair return demo. Pt denying any dizziness/lightheadedness upon standing. While standing at EOB initially, pt performed pre-gait lateral weight shifting and standing marches to assess quad control prior to initiating ambulation w/ fair steadiness noted throughout.    Ambulation  WB Status: LLE WBAT  Ambulation  Surface: Level tile  Device: Rolling Walker  Assistance: Contact guard assistance  Quality of Gait: fair steadiness, slow pace, step-to pattern  Gait Deviations: Slow Svetlana;Decreased step length;Decreased step height  Distance: 35ft x 2  Comments: Pt demonstrating fair steadiness throughout ambulation w/ RW.  Pt requiring min verbal cueing for proper gait sequence w/ RW post-op w/ good return demo.  More Ambulation?: No    Stairs/Curb  Stairs?: Yes  Stairs  # Steps : 1  Rails: None  Curbs: 8\" (platform step)  Device: Rolling walker  Assistance: Minimal assistance  Comment: Pt demonstrating fair steadiness throughout stair negotiation this date.  Pt requiring min verbal cueing for proper non-reciprocal stair negotiation pattern post-op w/ fair return demo.       Balance  Posture: Fair  Sitting - Static:

## 2024-11-29 ENCOUNTER — HOSPITAL ENCOUNTER (OUTPATIENT)
Age: 64
Setting detail: THERAPIES SERIES
Discharge: HOME | End: 2024-11-29
Attending: ORTHOPAEDIC SURGERY
Payer: COMMERCIAL

## 2024-11-29 ENCOUNTER — PATIENT MESSAGE (OUTPATIENT)
Dept: ORTHOPEDIC SURGERY | Age: 64
End: 2024-11-29

## 2024-11-29 DIAGNOSIS — Z96.652 S/P TOTAL KNEE ARTHROPLASTY, LEFT: Primary | ICD-10-CM

## 2024-11-29 LAB — SURGICAL PATHOLOGY REPORT: NORMAL

## 2024-11-29 PROCEDURE — 97016 VASOPNEUMATIC DEVICE THERAPY: CPT

## 2024-11-29 PROCEDURE — 97161 PT EVAL LOW COMPLEX 20 MIN: CPT

## 2024-11-29 PROCEDURE — 97110 THERAPEUTIC EXERCISES: CPT

## 2024-11-29 NOTE — CONSULTS
Bathroom has a []  Tub only  [] Tub/shower combo   [x] Walk in shower    [x]  Grab bars   Washing machine is on [x]  Main level   [] Second level   [] Basement   Employer [employer]   Job Status []  Normal duty   [] Light duty   [x] Off due to condition    []  Retired   [] Not employed   [] Disability  [] Other:  []  Return to work: January sometime   Work activities/duties Sitting and walking       ADL/IADL [x] Previously independent with all [] Currently independent with all Who currently assists the patient with task    [x] Previously independent with all except: [x] Currently independent with all except:    Bathing  [] Assist [x] Assist wife   Dress/grooming [] Assist [x] Assist wife   Transfer/mobility [] Assist [] Assist    Feeding [] Assist [] Assist    Toileting [] Assist [] Assist    Driving [] Assist [x] Assist wife   Housekeeping [] Assist [x] Assist wife   Grocery shop/meal prep [] Assist [x] Assist wife     Gait Prior level of function Current level of function    [x] Independent  [] Assist [x] Independent  [] Assist   Device: [x] Independent [x] Independent    [] Straight Cane [] Quad cane [] Straight Cane [] Quad cane    [] Standard walker [] Rolling walker   [] 4 wheeled walker [] Standard walker [x] Rolling walker   [] 4 wheeled walker    [] Wheelchair [] Wheelchair     Pain:  [x] Yes  [] No Location: L knee Pain Rating: (0-10 scale) 9/10  Pain altered Tx:  [x] Yes  [] No  Action: ex to tolerance  Symptoms:  [] Improving [] Worsening [x] Same  Better:  [] AM    [] PM    [] Sit    [] Rise/Sit    []Stand    [] Walk    [] Lying    [] Other:  Worse: [] AM    [] PM    [] Sit    [] Rise/Sit    []Stand    [] Walk    [] Lying    [] Bend                      [] Valsalva    [] Other:  Sleep: [] OK    [x] Disturbed    Objective:    ROM  ° A/P STRENGTH    Left Right Left Right   Hip Flex   2- 5   Ext       ER       IR       ABD       ADD       Knee Flex 58 132 3- 5   Ext Lacks7 0 1 5   Ankle DF   4+

## 2024-12-01 LAB
MICROORGANISM SPEC CULT: NORMAL
MICROORGANISM/AGENT SPEC: NORMAL
MICROORGANISM/AGENT SPEC: NORMAL
SERVICE CMNT-IMP: NORMAL
SPECIMEN DESCRIPTION: NORMAL

## 2024-12-02 RX ORDER — OXYCODONE AND ACETAMINOPHEN 5; 325 MG/1; MG/1
1 TABLET ORAL EVERY 6 HOURS PRN
Qty: 28 TABLET | Refills: 0 | Status: SHIPPED | OUTPATIENT
Start: 2024-12-02 | End: 2024-12-09

## 2024-12-03 ENCOUNTER — HOSPITAL ENCOUNTER (OUTPATIENT)
Age: 64
Setting detail: THERAPIES SERIES
Discharge: HOME | End: 2024-12-03
Attending: ORTHOPAEDIC SURGERY
Payer: COMMERCIAL

## 2024-12-03 PROCEDURE — 97110 THERAPEUTIC EXERCISES: CPT

## 2024-12-03 NOTE — FLOWSHEET NOTE
knee pain 0-4/10 with ADL and walking.  ? ROM: L knee ROM 0-100 deg for improved sit to stand.  ? Strength: Improve L LE strength to 4/5 for improved walking without AD.  ? Function:KOOS Jr less than 40% limitation for improved walking and stairs.  Patient to be independent with home exercise program as demonstrated by performance with correct form without cues.     LTG: (to be met in 20 treatments)  KOOS Jr less than 20% limitation for improved return to work.  Improve L SLS 10 sec or better to decrease fall risk.  Improve L LE strength to 5/5 MMT for reciprocal stairs.  Improve L knee AROM 0-120 deg for improved stairs.                    Patient goals: full ROM in knee and pain relief       Pt. Education:  [x] Yes  [] No  [x] Reviewed Prior HEP/Ed  Method of Education: [x] Verbal  [x] Demo  [] Written  Comprehension of Education:  [x] Verbalizes understanding.  [] Demonstrates understanding.  [] Needs review.  [x] Demonstrates/verbalizes HEP/Ed previously given.  Added seated knee flex stretch and calf stretch with belt;  pt reports he is familiar with these exercises and can add to HEP     Plan: [x] Continue current frequency toward long and short term goals.    [x] Specific Instructions for subsequent treatments: Work on left knee ROM and LE strength to reduce pain and improve mobility so he may resume all prior activities      Time In:07:55 AM            Time Out:  08:50  AM    Electronically signed by:  Alex Gonzalez PT

## 2024-12-05 ENCOUNTER — HOSPITAL ENCOUNTER (OUTPATIENT)
Age: 64
Setting detail: THERAPIES SERIES
Discharge: HOME | End: 2024-12-05
Attending: ORTHOPAEDIC SURGERY
Payer: COMMERCIAL

## 2024-12-05 PROCEDURE — 97110 THERAPEUTIC EXERCISES: CPT

## 2024-12-05 PROCEDURE — 97016 VASOPNEUMATIC DEVICE THERAPY: CPT

## 2024-12-05 NOTE — FLOWSHEET NOTE
[] [facility]  Outpatient Rehabilitation &  Therapy  [address]  [phone]  [phone] [] [facility]  Outpatient Rehabilitation &  Therapy  [address]  P: [phone]  F: [phone] [] [facility]  Outpatient Rehabilitation &  Therapy  [address]  P: [phone]  F: [phone] [] [facility]  Outpatient Rehabilitation &  Therapy  [address]  [phone]  [phone] [] [facility]  Outpatient Rehabilitation &  Therapy  [address]   P: [phone]  F: [phone]  [] [facility]  Outpatient Rehabilitation &  Therapy  [address]  P: [phone]  F: [phone] [x] [facility]  Outpatient Rehabilitation &  Therapy  [address]  P: [phone]  F: [phone] [] [facility]  Outpatient Rehabilitation &  Therapy  [address]  P: [phone]  F: [phone] [] [facility]  Outpatient Rehabilitation &  Therapy  [address]  P: [phone]  F: [phone]  [] [facility] Outpatient Rehabilitation &  Therapy  [address]  P: [phone]  F: [phone]     Physical Therapy Daily Treatment Note    Date:  2024  Patient Name:  Giovanny Figueroamartinlaangelyhaydee    :  1960  MRN: [id number]  Physician: Rizwan Adkins                               Insurance: [place], based on medical necessity  Medical Diagnosis: z98.890, z96.652                        Rehab Codes: m25.662, m25.562, m62.81, r26.89  Onset date: 24               Next Dr's appt.: 24     Visit# / total visits: 3/20     Cancels/No Shows: 0/0    Subjective:    Pain:  [x] Yes  [] No Location: Left knee N/A Pain Rating: (0-10 scale) 7/10  Pain altered Tx:  [x] No  [] Yes  Action:  Comments:  Pt reports he is still very sore but pain is

## 2024-12-06 ENCOUNTER — HOSPITAL ENCOUNTER (OUTPATIENT)
Age: 64
Setting detail: THERAPIES SERIES
Discharge: HOME | End: 2024-12-06
Attending: ORTHOPAEDIC SURGERY
Payer: COMMERCIAL

## 2024-12-06 PROCEDURE — 97110 THERAPEUTIC EXERCISES: CPT

## 2024-12-06 PROCEDURE — 97016 VASOPNEUMATIC DEVICE THERAPY: CPT

## 2024-12-06 NOTE — FLOWSHEET NOTE
balance and gait for return to work.     Problem list, as detailed above:   [x] ? Pain                       [x] ? ROM                      [x] ? Strength                 [x] ? Function:   [x] ? Balance  [x] Edema  [] Postural Deviations  [x] Gait Deviations  [] Other               STG: (to be met in 10 treatments)  ? Pain: L knee pain 0-4/10 with ADL and walking.  ? ROM: L knee ROM 0-100 deg for improved sit to stand.  ? Strength: Improve L LE strength to 4/5 for improved walking without AD.  ? Function:KOOS Jr less than 40% limitation for improved walking and stairs.  Patient to be independent with home exercise program as demonstrated by performance with correct form without cues.     LTG: (to be met in 20 treatments)  KOOS Jr less than 20% limitation for improved return to work.  Improve L SLS 10 sec or better to decrease fall risk.  Improve L LE strength to 5/5 MMT for reciprocal stairs.  Improve L knee AROM 0-120 deg for improved stairs.                    Patient goals: full ROM in knee and pain relief       Pt. Education:  [x] Yes  [] No  [x] Reviewed Prior HEP/Ed  Method of Education: [x] Verbal  [x] Demo  [] Written  Comprehension of Education:  [x] Verbalizes understanding.  [] Demonstrates understanding.  [] Needs review.  [x] Demonstrates/verbalizes HEP/Ed previously given.  Added seated knee flex stretch and calf stretch with belt;  pt reports he is familiar with these exercises and can add to HEP     Plan: [x] Continue current frequency toward long and short term goals.    [x] Specific Instructions for subsequent treatments: Work on left knee ROM and LE strength to reduce pain and improve mobility so he may resume all prior activities      Time In: 0800 am           Time Out:  0845 am    Electronically signed by:  Jennifer Wan PTA

## 2024-12-09 ENCOUNTER — OFFICE VISIT (OUTPATIENT)
Dept: ORTHOPEDIC SURGERY | Age: 64
End: 2024-12-09

## 2024-12-09 VITALS — BODY MASS INDEX: 28.23 KG/M2 | WEIGHT: 220 LBS | HEIGHT: 74 IN

## 2024-12-09 DIAGNOSIS — Z96.652 S/P TOTAL KNEE ARTHROPLASTY, LEFT: Primary | ICD-10-CM

## 2024-12-09 PROCEDURE — 99024 POSTOP FOLLOW-UP VISIT: CPT

## 2024-12-09 RX ORDER — OXYCODONE AND ACETAMINOPHEN 5; 325 MG/1; MG/1
1 TABLET ORAL EVERY 6 HOURS PRN
Qty: 28 TABLET | Refills: 0 | Status: SHIPPED | OUTPATIENT
Start: 2024-12-09 | End: 2024-12-16

## 2024-12-09 ASSESSMENT — ENCOUNTER SYMPTOMS
SHORTNESS OF BREATH: 0
COLOR CHANGE: 0
VOMITING: 0
NAUSEA: 0

## 2024-12-09 NOTE — PROGRESS NOTES
[facility] ORTHOPEDICS AND SPORTS MEDICINE  [address]  [address] [place], [place] [address]  Dept: [phone]  Dept Fax: [phone]        Postoperative follow-up note    Subjective:   Giovanny Hendricks is a 64 y.o. year old male who presents to our office today for postoperative followup regarding his   1. S/P total knee arthroplasty, left    .    Chief Complaint   Patient presents with    Knee Pain     L TKA DOS: 11/26/24         Date of Surgery: 11/26/24    History of Present Illness  The patient is a 64-year-old male who presents for evaluation of left knee pain.    He underwent a left knee revision surgery on 11/26/2024, performed by Dr. Adkins. His initial knee surgery was performed by Dr. Chavez 15 years ago. Over the past few days, his condition has improved, although he still experiences some throbbing pain. He is currently managing his pain with Percocet, taken every 4 to 5 hours post-physical therapy. He is also taking aspirin twice daily.    He has been diligent in icing and elevating his knee every hour and wears compression stockings, which he removes at night. He attends physical therapy three times a week at [facility] in [place] and makes an effort to walk every hour or two on his own. He is able to straighten his knee and has been performing exercises at home. He uses a walker for mobility.    He has no history of blood clots and reports no chest pain, shortness of breath, nausea, vomiting, fevers, or chills.         Review of Systems   Constitutional:  Negative for chills and fever.   Respiratory:  Negative for shortness of breath.    Cardiovascular:  Negative for chest pain.   Gastrointestinal:  Negative for nausea and vomiting.   Musculoskeletal:  Positive for arthralgias, gait problem and joint swelling.   Skin:  Negative for color change, rash and wound.   Neurological:  Positive for weakness. Negative for

## 2024-12-10 ENCOUNTER — HOSPITAL ENCOUNTER (OUTPATIENT)
Age: 64
Setting detail: THERAPIES SERIES
Discharge: HOME | End: 2024-12-10
Attending: ORTHOPAEDIC SURGERY
Payer: COMMERCIAL

## 2024-12-10 PROCEDURE — 97110 THERAPEUTIC EXERCISES: CPT

## 2024-12-10 PROCEDURE — 97016 VASOPNEUMATIC DEVICE THERAPY: CPT

## 2024-12-10 NOTE — FLOWSHEET NOTE
starting to feel a little bit better.  Objective:  Walked into clinic with rolling walker;  WBAT left LE,  antalgic  Modalities: vasocompression x15'  Precautions [] No  [x] Yes:   Left TKA 11/26/24  Exercises:  Exercise Reps/ Time Weight/ Level Comments   Nu Step L2  8'           Calf Stretch At SM 15\" x 4 NP Unable- ankle pain 12/6    Heel/Toe Raises 15 x  Increased reps 12/5   Squats 7 x  Increased reps  12/5   Step stretch for flex 15\" x 4  New 12/5   Step Ham stretch 15\" x 4  New 12/5   Step Ups                              LAQ 10x   Initial SBA-self max A at end reps   Seated Ham curls      Seated self flex stretch 10\" x 5  New 12/3  12/6- very painful   Seated heel slide 5x  Added 12/10         Heel Slides with belt 15x  New 12/3   Quad sets 10x     SLR 10x  Max A    Towel calf stretch 10\" x 5  New 12/3                     vasocompression x15'  38 deg, moderate/ high pressure   ROM left knee 2-75 °       Manual 5'  Patella and knee flex         Other:         Treatment Charges: Mins Units Time In/Out   []  Modalities        [x]  Ther Exercise 30 2 3:00- 3:30 pm   []  Neuromuscular Re-ed      []  Gait Training      []  Manual Therapy      []  Ther Activities      []  Aquatics      [x]  Vasocompression 15 1  3:30-3:45 pm   []  Cervical Traction      []  Other      Total Billable time 45 3           Assessment: [x] Progressing toward goals. Pt tolerated session well; improved strength when performing LAQ and increased quad recruitment with SLR. He will still benefit from skilled PT to work on left knee ROM and LE strength which should reduce his pain and help him return to full prior level of mobility                [] No change.     [] Other:  [x] Pt. Presents with limitations in ROM/strength/gait/balance and high pain as expected s/p L TKA 11/26/24. Patient would benefit from skilled physical therapy services in order to: improve L LE strength, knee ROM, pain, balance and gait for return to work.     Problem

## 2024-12-12 ENCOUNTER — HOSPITAL ENCOUNTER (OUTPATIENT)
Age: 64
Setting detail: THERAPIES SERIES
Discharge: HOME | End: 2024-12-12
Attending: ORTHOPAEDIC SURGERY
Payer: COMMERCIAL

## 2024-12-12 ENCOUNTER — PATIENT MESSAGE (OUTPATIENT)
Dept: NEUROLOGY | Age: 64
End: 2024-12-12

## 2024-12-12 PROCEDURE — 97110 THERAPEUTIC EXERCISES: CPT

## 2024-12-12 PROCEDURE — 97016 VASOPNEUMATIC DEVICE THERAPY: CPT

## 2024-12-13 ENCOUNTER — HOSPITAL ENCOUNTER (OUTPATIENT)
Age: 64
Setting detail: THERAPIES SERIES
Discharge: HOME | End: 2024-12-13
Attending: ORTHOPAEDIC SURGERY
Payer: COMMERCIAL

## 2024-12-13 PROCEDURE — 97016 VASOPNEUMATIC DEVICE THERAPY: CPT

## 2024-12-13 PROCEDURE — 97110 THERAPEUTIC EXERCISES: CPT

## 2024-12-13 NOTE — FLOWSHEET NOTE
[] [facility]  Outpatient Rehabilitation &  Therapy  [address]  [phone]  [phone] [] [facility]  Outpatient Rehabilitation &  Therapy  [address]  P: [phone]  F: [phone] [] [facility]  Outpatient Rehabilitation &  Therapy  [address]  P: [phone]  F: [phone] [] [facility]  Outpatient Rehabilitation &  Therapy  [address]  [phone]  [phone] [] [facility]  Outpatient Rehabilitation &  Therapy  [address]   P: [phone]  F: [phone]  [] [facility]  Outpatient Rehabilitation &  Therapy  [address]  P: [phone]  F: [phone] [x] [facility]  Outpatient Rehabilitation &  Therapy  [address]  P: [phone]  F: [phone] [] [facility]  Outpatient Rehabilitation &  Therapy  [address]  P: [phone]  F: [phone] [] [facility]  Outpatient Rehabilitation &  Therapy  [address]  P: [phone]  F: [phone]  [] [facility] Outpatient Rehabilitation &  Therapy  [address]  P: [phone]  F: [phone]     Physical Therapy Daily Treatment Note    Date:  2024  Patient Name:  Giovanny Figueroamartinlaangelyhaydee    :  1960  MRN: [id number]  Physician: Rizwan Adkins                               Insurance: [place], based on medical necessity  Medical Diagnosis: z98.890, z96.652                        Rehab Codes: m25.662, m25.562, m62.81, r26.89  Onset date: 24               Next Dr's appt.: 24     Visit# / total visits:      Cancels/No Shows: 0/0    Subjective:    Pain:  [x] Yes  [] No Location: Left knee N/A Pain Rating: (0-10 scale) 5/10  Pain altered Tx:  [x] No  [] Yes  Action:  Comments:  Took a pain pill prior to PT.  Icing as well.

## 2024-12-17 ENCOUNTER — HOSPITAL ENCOUNTER (OUTPATIENT)
Age: 64
Setting detail: THERAPIES SERIES
Discharge: HOME | End: 2024-12-17
Attending: ORTHOPAEDIC SURGERY
Payer: COMMERCIAL

## 2024-12-17 PROCEDURE — 97016 VASOPNEUMATIC DEVICE THERAPY: CPT

## 2024-12-17 PROCEDURE — 97110 THERAPEUTIC EXERCISES: CPT

## 2024-12-17 NOTE — FLOWSHEET NOTE
[] [facility]  Outpatient Rehabilitation &  Therapy  [address]  [phone]  [phone] [] [facility]  Outpatient Rehabilitation &  Therapy  [address]  P: [phone]  F: [phone] [] [facility]  Outpatient Rehabilitation &  Therapy  [address]  P: [phone]  F: [phone] [] [facility]  Outpatient Rehabilitation &  Therapy  [address]  [phone]  [phone] [] [facility]  Outpatient Rehabilitation &  Therapy  [address]   P: [phone]  F: [phone]  [] [facility]  Outpatient Rehabilitation &  Therapy  [address]  P: [phone]  F: [phone] [x] [facility]  Outpatient Rehabilitation &  Therapy  [address]  P: [phone]  F: [phone] [] [facility]  Outpatient Rehabilitation &  Therapy  [address]  P: [phone]  F: [phone] [] [facility]  Outpatient Rehabilitation &  Therapy  [address]  P: [phone]  F: [phone]  [] [facility] Outpatient Rehabilitation &  Therapy  [address]  P: [phone]  F: [phone]     Physical Therapy Daily Treatment Note    Date:  2024  Patient Name:  Giovanny Figueroamartinlaangelyhaydee    :  1960  MRN: [id number]  Physician: Rizwan Adkins                               Insurance: [place], based on medical necessity  Medical Diagnosis: z98.890, z96.652                        Rehab Codes: m25.662, m25.562, m62.81, r26.89  Onset date: 24               Next Dr's appt.: 24     Visit# / total visits:      Cancels/No Shows: 0/0    Subjective:    Pain:  [x] Yes  [] No Location: Left knee N/A Pain Rating: (0-10 scale) 4/10  Pain altered Tx:  [x] No  [] Yes  Action:  Comments:  Pt denies new problems.  He reports he still

## 2024-12-18 ENCOUNTER — PATIENT MESSAGE (OUTPATIENT)
Dept: ORTHOPEDIC SURGERY | Age: 64
End: 2024-12-18

## 2024-12-18 ENCOUNTER — HOSPITAL ENCOUNTER (OUTPATIENT)
Age: 64
Setting detail: THERAPIES SERIES
Discharge: HOME | End: 2024-12-18
Attending: ORTHOPAEDIC SURGERY
Payer: COMMERCIAL

## 2024-12-18 DIAGNOSIS — Z96.652 S/P TOTAL KNEE ARTHROPLASTY, LEFT: Primary | ICD-10-CM

## 2024-12-18 PROCEDURE — 97110 THERAPEUTIC EXERCISES: CPT

## 2024-12-18 RX ORDER — OXYCODONE AND ACETAMINOPHEN 5; 325 MG/1; MG/1
1 TABLET ORAL EVERY 6 HOURS PRN
Qty: 28 TABLET | Refills: 0 | Status: SHIPPED | OUTPATIENT
Start: 2024-12-18 | End: 2024-12-25

## 2024-12-18 NOTE — FLOWSHEET NOTE
[] [facility]  Outpatient Rehabilitation &  Therapy  [address]  [phone]  [phone] [] [facility]  Outpatient Rehabilitation &  Therapy  [address]  P: [phone]  F: [phone] [] [facility]  Outpatient Rehabilitation &  Therapy  [address]  P: [phone]  F: [phone] [] [facility]  Outpatient Rehabilitation &  Therapy  [address]  [phone]  [phone] [] [facility]  Outpatient Rehabilitation &  Therapy  [address]   P: [phone]  F: [phone]  [] [facility]  Outpatient Rehabilitation &  Therapy  [address]  P: [phone]  F: [phone] [x] [facility]  Outpatient Rehabilitation &  Therapy  [address]  P: [phone]  F: [phone] [] [facility]  Outpatient Rehabilitation &  Therapy  [address]  P: [phone]  F: [phone] [] [facility]  Outpatient Rehabilitation &  Therapy  [address]  P: [phone]  F: [phone]  [] [facility] Outpatient Rehabilitation &  Therapy  [address]  P: [phone]  F: [phone]     Physical Therapy Daily Treatment Note    Date:  2024  Patient Name:  Giovanny Figueroamartinlaangelyhaydee    :  1960  MRN: [id number]  Physician: Rizwan Adkins                               Insurance: [place], based on medical necessity  Medical Diagnosis: z98.890, z96.652                        Rehab Codes: m25.662, m25.562, m62.81, r26.89  Onset date: 24               Next Dr's appt.: 24     Visit# / total visits:      Cancels/No Shows: 0/0    Subjective:    Pain:  [x] Yes  [] No Location: Left knee N/A Pain Rating: (0-10 scale) 7/10  Pain altered Tx:  [x] No  [] Yes  Action:  Comments:  Just took a pain pill prior to PT.  Feels

## 2024-12-20 ENCOUNTER — HOSPITAL ENCOUNTER (OUTPATIENT)
Age: 64
Setting detail: THERAPIES SERIES
Discharge: HOME | End: 2024-12-20
Attending: ORTHOPAEDIC SURGERY
Payer: COMMERCIAL

## 2024-12-20 PROCEDURE — 97110 THERAPEUTIC EXERCISES: CPT

## 2024-12-20 PROCEDURE — 97016 VASOPNEUMATIC DEVICE THERAPY: CPT

## 2024-12-20 NOTE — PROGRESS NOTES
Action:  Comments: Feeling a bit better today than at last session    Objective:  Walked into clinic with rolling walker;  WBAT left LE,  minimal antalgia  Modalities: vasocompression x15'  Precautions [] No  [x] Yes:   Left TKA 11/26/24  Exercises:  Exercise Reps/ Time Weight/ Level Comments   Nu Step L3  6'           Calf Stretch At SM 15\" x 4 NP Unable- ankle pain 12/6    Heel/Toe Raises 15 x  Increased reps 12/5   Squats x15  Increased reps  12/5   Step stretch for flex 15\" x 4  New 12/5   Step Ham stretch 15\" x 4  New 12/5   Step Ups 6\" x 10  Added 12/17                           LAQ 15x   Initial SBA-self max A at end reps   Seated Ham curls      Seated self flex stretch 10\" x 5  New 12/3  12/6- very painful   Seated heel slide 5x NP  Added 12/10         Heel Slides with belt 10x  New 12/3   Quad sets 10x     SLR 10x  SBA    Towel calf stretch 15\"x4  New 12/3                     vasocompression x15'  34 deg, high pressure   ROM left knee 0-93 deg °   Was   0-86  0-91; 2-86 deg    Manual 5'  Patella and knee flex         Other:         Treatment Charges: Mins Units Time In/Out   []  Modalities        [x]  Ther Exercise 30 2 930-1000 am    []  Neuromuscular Re-ed      []  Gait Training      []  Manual Therapy      []  Ther Activities      []  Aquatics      [x]  Vasocompression 15 1 8051-8923   []  Cervical Traction      []  Other      Total Billable time  45   3           Assessment: [x] Progressing toward goals. PN completed today- No additions to program as ex's were recently added, although he did seem to move through ex's with less pain today and was able to complete SLR with SBA.   Continues to use walker for gait. Able to climb steps reciprocally, but not able to do without railings.   Pt still needs skilled PT due to recent right TKA revision and limited LE ROM and strength affecting his mobility and independence                [] No change.     [] Other:  [x] Pt. Presents with limitations in

## 2024-12-23 ENCOUNTER — HOSPITAL ENCOUNTER (OUTPATIENT)
Age: 64
Setting detail: THERAPIES SERIES
Discharge: HOME | End: 2024-12-23
Attending: ORTHOPAEDIC SURGERY
Payer: COMMERCIAL

## 2024-12-23 PROCEDURE — 97110 THERAPEUTIC EXERCISES: CPT

## 2024-12-23 NOTE — FLOWSHEET NOTE
Pt. Presents with limitations in ROM/strength/gait/balance and high pain as expected s/p L TKA 24. Patient would benefit from skilled physical therapy services in order to: improve L LE strength, knee ROM, pain, balance and gait for return to work.     Problem list, as detailed above:   [x] ? Pain                       [x] ? ROM                      [x] ? Strength                 [x] ? Function:   [x] ? Balance  [x] Edema  [] Postural Deviations  [x] Gait Deviations  [] Other               STG: (to be met in 10 treatments)  ? Pain: L knee pain 0-4/10 with ADL and walking. 24: PROGRESSING: Pain can still get up to 8/10 (has been weaning off pain meds).  ? ROM: L knee ROM 0-100 deg for improved sit to stand. 24: PROGRESSIN-93 deg AAROM  ? Strength: Improve L LE strength to 4/5 for improved walking without AD. 24: PROGRESSING:Still relies on roll walker. Still has 11 deg quad lag. Left knee quad strength -4/5, left knee hamstring strength 3+/5 (pain)  ? Function:KOOS Jr less than 40% limitation for improved walking and stairs. 24: PROGRESSING: scored 16/28 (57% limited)  Patient to be independent with home exercise program as demonstrated by performance with correct form without cues. 24: PROGRESSING- continues to require verbal cues for ex's and guidance for advancement.     LTG: (to be met in 20 treatments)  KOOS Jr less than 20% limitation for improved return to work.  Improve L SLS 10 sec or better to decrease fall risk.  Improve L LE strength to 5/5 MMT for reciprocal stairs.  Improve L knee AROM 0-120 deg for improved stairs.                    Patient goals: full ROM in knee and pain relief       Pt. Education:  [x] Yes  [] No  [x] Reviewed Prior HEP/Ed  Method of Education: [x] Verbal  [x] Demo  [] Written  Comprehension of Education:  [x] Verbalizes understanding.  [] Demonstrates understanding.  [] Needs review.  [x] Demonstrates/verbalizes HEP/Ed previously

## 2024-12-26 ENCOUNTER — HOSPITAL ENCOUNTER (OUTPATIENT)
Age: 64
Setting detail: THERAPIES SERIES
Discharge: HOME | End: 2024-12-26
Attending: ORTHOPAEDIC SURGERY
Payer: COMMERCIAL

## 2024-12-26 PROCEDURE — 97016 VASOPNEUMATIC DEVICE THERAPY: CPT

## 2024-12-26 PROCEDURE — 97110 THERAPEUTIC EXERCISES: CPT

## 2024-12-26 NOTE — FLOWSHEET NOTE
calf stretch with belt;  pt reports he is familiar with these exercises and can add to HEP     Plan: [x] Continue current frequency toward long and short term goals.    [x] Specific Instructions for subsequent treatments: Work on left knee ROM and LE strength to reduce pain and improve mobility so he may resume all prior activities. Increase reps/ add weight when appropriate    Time In:  5:00 pm          Time Out:   5:50 pm    Electronically signed by:  Jennifer Wan PTA

## 2024-12-30 ENCOUNTER — PATIENT MESSAGE (OUTPATIENT)
Dept: ORTHOPEDIC SURGERY | Age: 64
End: 2024-12-30

## 2024-12-30 DIAGNOSIS — Z96.652 S/P TOTAL KNEE ARTHROPLASTY, LEFT: Primary | ICD-10-CM

## 2024-12-30 RX ORDER — OXYCODONE AND ACETAMINOPHEN 5; 325 MG/1; MG/1
1 TABLET ORAL EVERY 8 HOURS PRN
Qty: 21 TABLET | Refills: 0 | Status: SHIPPED | OUTPATIENT
Start: 2024-12-30 | End: 2025-01-06

## 2024-12-30 NOTE — TELEPHONE ENCOUNTER
[facility] pended to help get thru PT per patient request.  Last refill 12/18/25 to 12/25/24.  DOS 11/26/24, Left TKA revision

## 2024-12-31 ENCOUNTER — HOSPITAL ENCOUNTER (OUTPATIENT)
Age: 64
Setting detail: THERAPIES SERIES
Discharge: HOME | End: 2024-12-31
Attending: ORTHOPAEDIC SURGERY
Payer: COMMERCIAL

## 2024-12-31 PROCEDURE — 97016 VASOPNEUMATIC DEVICE THERAPY: CPT

## 2024-12-31 PROCEDURE — 97110 THERAPEUTIC EXERCISES: CPT

## 2024-12-31 NOTE — FLOWSHEET NOTE
[] [facility]  Outpatient Rehabilitation &  Therapy  [address]  [phone]  [phone] [] [facility]  Outpatient Rehabilitation &  Therapy  [address]  P: [phone]  F: [phone] [] [facility]  Outpatient Rehabilitation &  Therapy  [address]  P: [phone]  F: [phone] [] [facility]  Outpatient Rehabilitation &  Therapy  [address]  [phone]  [phone] [] [facility]  Outpatient Rehabilitation &  Therapy  [address]   P: [phone]  F: [phone]  [] [facility]  Outpatient Rehabilitation &  Therapy  [address]  P: [phone]  F: [phone] [x] [facility]  Outpatient Rehabilitation &  Therapy  [address]  P: [phone]  F: [phone] [] [facility]  Outpatient Rehabilitation &  Therapy  [address]  P: [phone]  F: [phone] [] [facility]  Outpatient Rehabilitation &  Therapy  [address]  P: [phone]  F: [phone]  [] [facility] Outpatient Rehabilitation &  Therapy  [address]  P: [phone]  F: [phone]     Physical Therapy Daily Treatment Note    Date:  2024  Patient Name:  Giovanny Figueroamartinlaangelyhaydee    :  1960  MRN: [id number]  Physician: Rizwan Adkins                               Insurance: [place], based on medical necessity  Medical Diagnosis: z98.890, z96.652                        Rehab Codes: m25.662, m25.562, m62.81, r26.89  Onset date: 24               Next 's appt.: 24  Visit# / total visits:      Cancels/No Shows: 0/0    Subjective:    Pain:  [x] Yes  [] No Location: Left knee N/A Pain Rating: (0-10 scale) 3/10  Pain altered Tx:  [x] No  [] Yes  Action:  Comments: Took meds before coming in today.  Keeping up

## 2025-01-02 ENCOUNTER — HOSPITAL ENCOUNTER (OUTPATIENT)
Age: 65
Setting detail: THERAPIES SERIES
Discharge: HOME | End: 2025-01-02
Attending: ORTHOPAEDIC SURGERY
Payer: COMMERCIAL

## 2025-01-02 PROCEDURE — 97110 THERAPEUTIC EXERCISES: CPT

## 2025-01-02 PROCEDURE — 97016 VASOPNEUMATIC DEVICE THERAPY: CPT

## 2025-01-02 NOTE — FLOWSHEET NOTE
[] [facility]  Outpatient Rehabilitation &  Therapy  [address]  [phone]  [phone] [] [facility]  Outpatient Rehabilitation &  Therapy  [address]  P: [phone]  F: [phone] [] [facility]  Outpatient Rehabilitation &  Therapy  [address]  P: [phone]  F: [phone] [] [facility]  Outpatient Rehabilitation &  Therapy  [address]  [phone]  [phone] [] [facility]  Outpatient Rehabilitation &  Therapy  [address]   P: [phone]  F: [phone]  [] [facility]  Outpatient Rehabilitation &  Therapy  [address]  P: [phone]  F: [phone] [x] [facility]  Outpatient Rehabilitation &  Therapy  [address]  P: [phone]  F: [phone] [] [facility]  Outpatient Rehabilitation &  Therapy  [address]  P: [phone]  F: [phone] [] [facility]  Outpatient Rehabilitation &  Therapy  [address]  P: [phone]  F: [phone]  [] [facility] Outpatient Rehabilitation &  Therapy  [address]  P: [phone]  F: [phone]     Physical Therapy Daily Treatment Note    Date:  2025  Patient Name:  Giovanny Hendricks    :  1960  MRN: [id number]  Physician: Rizwan Adkins                               Insurance: [place], based on medical necessity  Medical Diagnosis: z98.890, z96.652                        Rehab Codes: m25.662, m25.562, m62.81, r26.89  Onset date: 24               Next 's appt.: 24  Visit# / total visits:      Cancels/No Shows: 0/0    Subjective:    Pain:  [x] Yes  [] No Location: Left knee N/A Pain Rating: (0-10 scale) 3/10  Pain altered Tx:  [x] No  [] Yes  Action:  Comments: Pt reports he took pain meds prior to PT.  He

## 2025-01-05 RX ORDER — PANTOPRAZOLE SODIUM 40 MG/1
TABLET, DELAYED RELEASE ORAL
Qty: 60 TABLET | Refills: 1 | Status: SHIPPED | OUTPATIENT
Start: 2025-01-05

## 2025-01-06 ENCOUNTER — HOSPITAL ENCOUNTER (OUTPATIENT)
Age: 65
Setting detail: THERAPIES SERIES
Discharge: HOME | End: 2025-01-06
Attending: ORTHOPAEDIC SURGERY
Payer: COMMERCIAL

## 2025-01-06 ENCOUNTER — OFFICE VISIT (OUTPATIENT)
Dept: ORTHOPEDIC SURGERY | Age: 65
End: 2025-01-06

## 2025-01-06 VITALS — HEIGHT: 74 IN | BODY MASS INDEX: 28.23 KG/M2 | WEIGHT: 220 LBS | RESPIRATION RATE: 14 BRPM

## 2025-01-06 DIAGNOSIS — Z96.652 S/P TOTAL KNEE ARTHROPLASTY, LEFT: Primary | ICD-10-CM

## 2025-01-06 PROCEDURE — 97110 THERAPEUTIC EXERCISES: CPT

## 2025-01-06 PROCEDURE — 97016 VASOPNEUMATIC DEVICE THERAPY: CPT

## 2025-01-06 PROCEDURE — 99024 POSTOP FOLLOW-UP VISIT: CPT

## 2025-01-06 RX ORDER — HYDROCODONE BITARTRATE AND ACETAMINOPHEN 5; 325 MG/1; MG/1
1 TABLET ORAL AS NEEDED
Qty: 10 TABLET | Refills: 0 | Status: SHIPPED | OUTPATIENT
Start: 2025-01-06 | End: 2025-01-08

## 2025-01-06 ASSESSMENT — ENCOUNTER SYMPTOMS
VOMITING: 0
COLOR CHANGE: 0
NAUSEA: 0

## 2025-01-06 NOTE — PROGRESS NOTES
Intended supply: 3 days. Take lowest dose possible to manage pain Max Daily Amount: 1 tablet     Dispense:  10 tablet     Refill:  0     Reduce doses taken as pain becomes manageable       No orders of the defined types were placed in this encounter.      This note is created with the assistance of a speech recognition program.  While intending to generate a document that actually reflects the content of the visit, the document can still have some errors including those of syntax and sound a like substitutions which may escape proof reading.  In such instances, actual meaning can be extrapolated by contextual diversion.     Electronically signed by Kayli David PA-C on 1/6/2025 at 11:38 AM

## 2025-01-06 NOTE — FLOWSHEET NOTE
this. Using cane most of the day, although will use walker towards end of the day as needed.  He still needs skilled PT to continue to improve his knee ROM and LE strength to help reduce his pain and improve safe mobility.                [] No change.     [] Other:  [x] Pt. Presents with limitations in ROM/strength/gait/balance and high pain as expected s/p L TKA 24. Patient would benefit from skilled physical therapy services in order to: improve L LE strength, knee ROM, pain, balance and gait for return to work.     Problem list, as detailed above:   [x] ? Pain                       [x] ? ROM                      [x] ? Strength                 [x] ? Function:   [x] ? Balance  [x] Edema  [] Postural Deviations  [x] Gait Deviations  [] Other               STG: (to be met in 10 treatments)  ? Pain: L knee pain 0-4/10 with ADL and walking. 24: PROGRESSING: Pain can still get up to 8/10 (has been weaning off pain meds).  ? ROM: L knee ROM 0-100 deg for improved sit to stand. 24: PROGRESSIN-93 deg AAROM  ? Strength: Improve L LE strength to 4/5 for improved walking without AD. 24: PROGRESSING:Still relies on roll walker. Still has 11 deg quad lag. Left knee quad strength -4/5, left knee hamstring strength 3+/5 (pain)  ? Function:KOOS Jr less than 40% limitation for improved walking and stairs. 24: PROGRESSING: scored 16/28 (57% limited)  Patient to be independent with home exercise program as demonstrated by performance with correct form without cues. 24: PROGRESSING- continues to require verbal cues for ex's and guidance for advancement.     LTG: (to be met in 20 treatments)  KOOS Jr less than 20% limitation for improved return to work.  Improve L SLS 10 sec or better to decrease fall risk.  Improve L LE strength to 5/5 MMT for reciprocal stairs.  Improve L knee AROM 0-120 deg for improved stairs.                    Patient goals: full ROM in knee and pain relief       Pt.

## 2025-01-08 ENCOUNTER — HOSPITAL ENCOUNTER (OUTPATIENT)
Age: 65
Setting detail: THERAPIES SERIES
Discharge: HOME | End: 2025-01-08
Attending: ORTHOPAEDIC SURGERY
Payer: COMMERCIAL

## 2025-01-08 RX ORDER — HYDROCODONE BITARTRATE AND ACETAMINOPHEN 5; 325 MG/1; MG/1
1 TABLET ORAL AS NEEDED
Qty: 7 TABLET | Refills: 0 | Status: SHIPPED | OUTPATIENT
Start: 2025-01-08 | End: 2025-01-15

## 2025-01-08 NOTE — FLOWSHEET NOTE
[x] [facility]  Outpatient Rehabilitation & Therapy  [address]   [place], [place] [address]       Physical Therapy Cancel/No Show note    Date: 2025  Patient: Giovanny Hendricks  : 1960  MRN: [id number]      Cancels/No Shows to date:     For today's appointment patient:    [x]  Cancelled    [] Rescheduled appointment    [] No-show     Reason given by patient:    [x]  Patient ill    []  Conflicting appointment    [] No transportation      [] Conflict with work    [] No reason given    [] Weather related    [] COVID-19    [] Other:      Comments:        [x] Next appointment was confirmed    Electronically signed by: Jennifer Wan PTA

## 2025-01-10 ENCOUNTER — HOSPITAL ENCOUNTER (OUTPATIENT)
Age: 65
Setting detail: THERAPIES SERIES
Discharge: HOME | End: 2025-01-10
Attending: ORTHOPAEDIC SURGERY
Payer: COMMERCIAL

## 2025-01-10 PROCEDURE — 97110 THERAPEUTIC EXERCISES: CPT

## 2025-01-10 PROCEDURE — 97016 VASOPNEUMATIC DEVICE THERAPY: CPT

## 2025-01-10 NOTE — FLOWSHEET NOTE
[] [facility]  Outpatient Rehabilitation &  Therapy  [address]  [phone]  [phone] [] [facility]  Outpatient Rehabilitation &  Therapy  [address]  P: [phone]  F: [phone] [] [facility]  Outpatient Rehabilitation &  Therapy  [address]  P: [phone]  F: [phone] [] [facility]  Outpatient Rehabilitation &  Therapy  [address]  [phone]  [phone] [] [facility]  Outpatient Rehabilitation &  Therapy  [address]   P: [phone]  F: [phone]  [] [facility]  Outpatient Rehabilitation &  Therapy  [address]  P: [phone]  F: [phone] [x] [facility]  Outpatient Rehabilitation &  Therapy  [address]  P: [phone]  F: [phone] [] [facility]  Outpatient Rehabilitation &  Therapy  [address]  P: [phone]  F: [phone] [] [facility]  Outpatient Rehabilitation &  Therapy  [address]  P: [phone]  F: [phone]  [] [facility] Outpatient Rehabilitation &  Therapy  [address]  P: [phone]  F: [phone]     Physical Therapy Daily Treatment Note    Date:  1/10/2025  Patient Name:  Giovanny Figueroamartinlaangelyhaydee    :  1960  MRN: [id number]  Physician: Rizwan Adkins                               Insurance: [place], based on medical necessity  Medical Diagnosis: z98.890, z96.652                        Rehab Codes: m25.662, m25.562, m62.81, r26.89  Onset date: 24               Next 's appt.: 24  Visit# / total visits:      Cancels/No Shows: 0/0    Subjective:    Pain:  [x] Yes  [] No Location: Left knee N/A Pain Rating: (0-10 scale) 4/10  Pain altered Tx:  [x] No  [] Yes  Action:  Comments:  He continues to note getting into car and moving

## 2025-01-14 ENCOUNTER — PATIENT MESSAGE (OUTPATIENT)
Dept: FAMILY MEDICINE CLINIC | Age: 65
End: 2025-01-14

## 2025-01-14 ENCOUNTER — PATIENT MESSAGE (OUTPATIENT)
Dept: NEUROLOGY | Age: 65
End: 2025-01-14

## 2025-01-14 ENCOUNTER — HOSPITAL ENCOUNTER (OUTPATIENT)
Age: 65
Setting detail: THERAPIES SERIES
Discharge: HOME | End: 2025-01-14
Attending: ORTHOPAEDIC SURGERY
Payer: COMMERCIAL

## 2025-01-14 DIAGNOSIS — I10 ESSENTIAL HYPERTENSION: ICD-10-CM

## 2025-01-14 DIAGNOSIS — G43.719 INTRACTABLE CHRONIC MIGRAINE WITHOUT AURA AND WITHOUT STATUS MIGRAINOSUS: ICD-10-CM

## 2025-01-14 PROCEDURE — 97140 MANUAL THERAPY 1/> REGIONS: CPT

## 2025-01-14 PROCEDURE — 97016 VASOPNEUMATIC DEVICE THERAPY: CPT

## 2025-01-14 PROCEDURE — 97110 THERAPEUTIC EXERCISES: CPT

## 2025-01-14 RX ORDER — LISINOPRIL AND HYDROCHLOROTHIAZIDE 12.5; 2 MG/1; MG/1
2 TABLET ORAL DAILY
Qty: 180 TABLET | Refills: 1 | Status: SHIPPED | OUTPATIENT
Start: 2025-01-14

## 2025-01-14 NOTE — TELEPHONE ENCOUNTER
Pharmacy requesting refill of Atogepant (QULIPTA) 60 MG TABS      Medication active on med list yes      Date of last Rx: 7/15/2024 with 5 refills          verified by SANA BORDEN      Date of last appointment 10/31/2024    Next Visit Date:  2/5/2025

## 2025-01-14 NOTE — FLOWSHEET NOTE
[] [facility]  Outpatient Rehabilitation &  Therapy  [address]  [phone]  [phone] [] [facility]  Outpatient Rehabilitation &  Therapy  [address]  P: [phone]  F: [phone] [] [facility]  Outpatient Rehabilitation &  Therapy  [address]  P: [phone]  F: [phone] [] [facility]  Outpatient Rehabilitation &  Therapy  [address]  [phone]  [phone] [] [facility]  Outpatient Rehabilitation &  Therapy  [address]   P: [phone]  F: [phone]  [] [facility]  Outpatient Rehabilitation &  Therapy  [address]  P: [phone]  F: [phone] [x] [facility]  Outpatient Rehabilitation &  Therapy  [address]  P: [phone]  F: [phone] [] [facility]  Outpatient Rehabilitation &  Therapy  [address]  P: [phone]  F: [phone] [] [facility]  Outpatient Rehabilitation &  Therapy  [address]  P: [phone]  F: [phone]  [] [facility] Outpatient Rehabilitation &  Therapy  [address]  P: [phone]  F: [phone]     Physical Therapy Daily Treatment Note    Date:  2025  Patient Name:  Giovanny Figueroamartinlaangelyhaydee    :  1960  MRN: [id number]  Physician: Rizwan Adkins                               Insurance: [place], based on medical necessity  Medical Diagnosis: z98.890, z96.652                        Rehab Codes: m25.662, m25.562, m62.81, r26.89  Onset date: 24               Next 's appt.: 24  Visit# / total visits:      Cancels/No Shows: 0/0    Subjective:    Pain:  [x] Yes  [] No Location: Left knee N/A Pain Rating: (0-10 scale) 5/10  Pain altered Tx:  [x] No  [] Yes  Action:  Comments:  Pt stated his left knee is a little more sore

## 2025-01-14 NOTE — TELEPHONE ENCOUNTER
Giovanny Hendricks is calling to request a refill on the following medication(s):    Medication Request:  Requested Prescriptions     Pending Prescriptions Disp Refills    lisinopril-hydroCHLOROthiazide (PRINZIDE;ZESTORETIC) 20-12.5 MG per tablet 180 tablet 1     Sig: Take 2 tablets by mouth daily       Last Visit Date (If Applicable):  Visit date not found    Next Visit Date:    Visit date not found

## 2025-01-15 RX ORDER — ATOGEPANT 60 MG/1
1 TABLET ORAL DAILY
Qty: 30 TABLET | Refills: 5 | Status: SHIPPED | OUTPATIENT
Start: 2025-01-15

## 2025-01-16 ENCOUNTER — HOSPITAL ENCOUNTER (OUTPATIENT)
Age: 65
Setting detail: THERAPIES SERIES
Discharge: HOME | End: 2025-01-16
Attending: ORTHOPAEDIC SURGERY
Payer: COMMERCIAL

## 2025-01-16 PROCEDURE — 97016 VASOPNEUMATIC DEVICE THERAPY: CPT

## 2025-01-16 PROCEDURE — 97110 THERAPEUTIC EXERCISES: CPT

## 2025-01-16 NOTE — FLOWSHEET NOTE
today; has been doing a lot of walking and stretching the last couple of days. Pt noted his left hamstring still bothers him sometimes depending on what he's doing.    Objective:  Walked into clinic with st cane-   JUANAT left LE,  minimal antalgia  Modalities: vasocompression x15'  Precautions [] No  [x] Yes:   Left TKA 11/26/24  Exercises:  Exercise Reps/ Time Weight/ Level Comments   Nu Step L3  5'           Calf Stretch At SM 15\" x 4 NP Unable- ankle pain 12/6    Heel/Toe Raises 15 x  Increased reps 12/5   Squats x15  Increased reps  12/5   Step stretch for flex 15\" x 4  New 12/5   Step Ham stretch 15\" x 4  New 12/5   Step Ups 6\" x 15  Increased reps  1/2   Lateral step ups 4\"x15  Increased reps 1/2   Standing  3 way kicks X15 terrie 2# (1/16 no weight) Added 12/26                     LAQ 15x  3# Increased weight 1/2   Seated Ham curls 15x orange Added 12/23   Seated self flex stretch 10\" x 5 NP  New 12/3  12/6- very painful   Seated heel slide 5x NP  Added 12/10   Seated chair flexion stretch 4x15\"  Added 1/14; performed on elevated mat   Heel Slides with belt 15x  New 12/3   Quad sets 10x     SLR 15x  SBA    Towel calf stretch 15\"x4 NP  New 12/3         airdyne 4' Seat 4 Added 1/16         vasocompression X15'   38 deg, low pressure   ROM left knee       AAROM    0-110 Was  0-108  0-100   0-98   0-95  0-93  0-93   0-86  0-91; 2-86 deg    Manual 8' NP  Seated and supine ant/post mobs, distraction, patellar mobs         Other:         Treatment Charges: Mins Units Time In/Out   []  Modalities        [x]  Ther Exercise 40 3 5-540 pm   []  Neuromuscular Re-ed      []  Gait Training      []  Manual Therapy      []  Ther Activities      []  Aquatics      [x]  Vasocompression 15 1 540-555 pm   []  Cervical Traction      []  Other      Total Billable time 55   4           Assessment: [x] Progressing toward goals Added airdyne for ROM - able to make a full revolution, but painful. No change in ROM today, however he had a

## 2025-01-17 ENCOUNTER — TELEPHONE (OUTPATIENT)
Dept: NEUROLOGY | Age: 65
End: 2025-01-17

## 2025-01-17 NOTE — TELEPHONE ENCOUNTER
Pharmacy requesting refill of Botox 200 units.      Medication active on med list yes      Date of last Rx: 12/20/2023 with 4 refills          verified by SB, RMA      Date of last appointment 10/31/2024    Next Visit Date:  2/5/2025

## 2025-01-20 ENCOUNTER — PATIENT MESSAGE (OUTPATIENT)
Dept: GASTROENTEROLOGY | Age: 65
End: 2025-01-20

## 2025-01-21 ENCOUNTER — HOSPITAL ENCOUNTER (OUTPATIENT)
Age: 65
Setting detail: THERAPIES SERIES
Discharge: HOME | End: 2025-01-21
Attending: ORTHOPAEDIC SURGERY
Payer: COMMERCIAL

## 2025-01-21 PROCEDURE — 97140 MANUAL THERAPY 1/> REGIONS: CPT

## 2025-01-21 PROCEDURE — 97110 THERAPEUTIC EXERCISES: CPT

## 2025-01-21 PROCEDURE — 97016 VASOPNEUMATIC DEVICE THERAPY: CPT

## 2025-01-21 NOTE — FLOWSHEET NOTE
activities. Increase reps when appropriate    Time In:  3:40 pm      Time out: 4:35 pm    Electronically signed by:  KRIS HOLLEY PTA

## 2025-01-24 ENCOUNTER — HOSPITAL ENCOUNTER (OUTPATIENT)
Age: 65
Setting detail: THERAPIES SERIES
End: 2025-01-24
Attending: ORTHOPAEDIC SURGERY
Payer: COMMERCIAL

## 2025-01-27 ENCOUNTER — PATIENT MESSAGE (OUTPATIENT)
Dept: NEUROLOGY | Age: 65
End: 2025-01-27

## 2025-01-27 NOTE — TELEPHONE ENCOUNTER
Yara received email from the billing department, they are aware of this patients billing issue and are working on it. This patient and other patients who have patient supplied medications are having an issue with billing. Again billing department is aware of this and they are working on resolving this. Yara advised myself that this patient is okay to come in for his botox appointment and per billing does not need to be rescheduled. I called and left a message for patient with this information and requested a call back to discuss further.

## 2025-01-28 ENCOUNTER — HOSPITAL ENCOUNTER (OUTPATIENT)
Age: 65
Setting detail: THERAPIES SERIES
Discharge: HOME | End: 2025-01-28
Attending: ORTHOPAEDIC SURGERY
Payer: COMMERCIAL

## 2025-01-28 PROCEDURE — 97140 MANUAL THERAPY 1/> REGIONS: CPT

## 2025-01-28 PROCEDURE — 97110 THERAPEUTIC EXERCISES: CPT

## 2025-01-28 NOTE — TELEPHONE ENCOUNTER
Email forwarded to myself from . Billing is aware of this patients botox issue and they are working on correcting it. They have advised to let the patient know to essentially ignore this bill. I have advised patient of this, he has voiced understanding and is agreeable to returning for his next appointment.

## 2025-01-28 NOTE — PROGRESS NOTES
[] [facility]  Outpatient Rehabilitation &  Therapy  [address]  [phone]  [phone] [] [facility]  Outpatient Rehabilitation &  Therapy  [address]  P: [phone]  F: [phone] [] [facility]  Outpatient Rehabilitation &  Therapy  [address]  P: [phone]  F: [phone] [] [facility]  Outpatient Rehabilitation &  Therapy  [address]  [phone]  [phone] [] [facility]  Outpatient Rehabilitation &  Therapy  [address]   P: [phone]  F: [phone]  [] [facility]  Outpatient Rehabilitation &  Therapy  [address]  P: [phone]  F: [phone] [x] [facility]  Outpatient Rehabilitation &  Therapy  [address]  P: [phone]  F: [phone] [] [facility]  Outpatient Rehabilitation &  Therapy  [address]  P: [phone]  F: [phone] [] [facility]  Outpatient Rehabilitation &  Therapy  [address]  P: [phone]  F: [phone]  [] [facility] Outpatient Rehabilitation &  Therapy  [address]  P: [phone]  F: [phone]     Physical Therapy Daily Treatment Note/PROGRESS NOTE    Date:  2025  Patient Name:  Giovanny Figueroashainahaydee    :  1960  MRN: [id number]  Physician: Rizwan Adkins                               Insurance: [place], based on medical necessity  Medical Diagnosis: z98.890, z96.652                        Rehab Codes: m25.662, m25.562, m62.81, r26.89  Onset date: 24               Next 's appt.: 24  Visit# / total visits:      Cancels/No Shows: 0/0  This note covers from :  24--25    Subjective:    Pain:  [x] Yes  [] No Location: Left knee N/A Pain Rating: (0-10 scale) 0/10 at rest; mainly stiffness in knee; hamstring

## 2025-01-28 NOTE — PROGRESS NOTES
[x] [facility]  Outpatient Rehabilitation &  Therapy  P [phone]  F [phone]         Physical Therapy Progress Note    Date: 2025      Patient: Giovanny Hendricks  : 1960  MRN: [id number]    Physician: Rizwan Adkins                               Insurance: [place], based on medical necessity  Medical Diagnosis: z98.890, z96.652                        Rehab Codes: m25.662, m25.562, m62.81, r26.89  Onset date: 24               Next 's appt.: 24  Visit# / total visits:                                 Cancels/No Shows: 0/0  This note covers from :  24--25         Subjective:    Pain:  [x] Yes  [] No   Location: Left knee N/A          Pain Rating: (0-10 scale) 0/10 at rest; mainly stiffness in knee; hamstring pain =6/10 at worst.  Pain altered Tx:  [x] No  [] Yes  Action:  Comments:  Pt stated his left hamstring pain still comes and goes. Noted he sometimes wears a compression sleeve on it and that it seems to help a little.      Assessment:  [x] Progressing toward goals Pt tolerated well; able to perform lateral step ups on higher step today. Slight decrease in knee flexion, however pt attributes this to only having one PT appointment last week. Still having pain in left lateral hamstring area limiting mobility.              Pt has met  all STGs at this time.  He is progressing toward all LTGs.  He still has deficits in left knee ROM, Left leg strength, and mobility.  Skilled PT will continue to work toward these goals and help pt return to full prior level of function as pain continues to decrease.                          [] No change.                          [] Other:  [x] Pt. Presents with limitations in ROM/strength/gait/balance and high pain as expected s/p L TKA 24. Patient would benefit from skilled physical therapy services in order to: improve L LE strength, knee ROM, pain, balance and gait for return to work.         STG: (to be met in 10

## 2025-01-30 ENCOUNTER — HOSPITAL ENCOUNTER (OUTPATIENT)
Age: 65
Setting detail: THERAPIES SERIES
Discharge: HOME | End: 2025-01-30
Attending: ORTHOPAEDIC SURGERY
Payer: COMMERCIAL

## 2025-01-30 PROCEDURE — 97140 MANUAL THERAPY 1/> REGIONS: CPT

## 2025-01-30 PROCEDURE — 97110 THERAPEUTIC EXERCISES: CPT

## 2025-01-30 NOTE — PROGRESS NOTES
[] [facility]  Outpatient Rehabilitation &  Therapy  [address]  [phone]  [phone] [] [facility]  Outpatient Rehabilitation &  Therapy  [address]  P: [phone]  F: [phone] [] [facility]  Outpatient Rehabilitation &  Therapy  [address]  P: [phone]  F: [phone] [] [facility]  Outpatient Rehabilitation &  Therapy  [address]  [phone]  [phone] [] [facility]  Outpatient Rehabilitation &  Therapy  [address]   P: [phone]  F: [phone]  [] [facility]  Outpatient Rehabilitation &  Therapy  [address]  P: [phone]  F: [phone] [x] [facility]  Outpatient Rehabilitation &  Therapy  [address]  [address]  P: [phone]  F: [phone] [] [facility]  Outpatient Rehabilitation &  Therapy  [address]  P: [phone]  F: [phone] [] [facility]  Outpatient Rehabilitation &  Therapy  [address]  P: [phone]  F: [phone]  [] [facility] Outpatient Rehabilitation &  Therapy  [address]  P: [phone]  F: [phone]     Physical Therapy Daily Treatment Note/PROGRESS NOTE    Date:  2025  Patient Name:  Giovanny Figueroamartinlaangelyhaydee    :  1960  MRN: [id number]  Physician: Rizwan Adkins                               Insurance: [place], based on medical necessity  Medical Diagnosis: z98.890, z96.652                        Rehab Codes: m25.662, m25.562, m62.81, r26.89  Onset date: 24               Next 's appt.: 24  Visit# / total visits:      Cancels/No Shows: 0/0      Subjective:    Pain:  [x] Yes  [] No Location: Left knee N/A Pain Rating: (0-10 scale) 0/10 at rest; mainly stiffness in knee; hamstring pain =6/10 at worst.  Pain altered Tx:  [x]

## 2025-02-04 ENCOUNTER — HOSPITAL ENCOUNTER (OUTPATIENT)
Age: 65
Setting detail: THERAPIES SERIES
Discharge: HOME | End: 2025-02-04
Attending: ORTHOPAEDIC SURGERY
Payer: COMMERCIAL

## 2025-02-04 PROCEDURE — 97110 THERAPEUTIC EXERCISES: CPT

## 2025-02-04 PROCEDURE — 97140 MANUAL THERAPY 1/> REGIONS: CPT

## 2025-02-04 NOTE — FLOWSHEET NOTE
Education:  [x] Verbalizes understanding.  [] Demonstrates understanding.  [] Needs review.  [x] Demonstrates/verbalizes HEP/Ed previously given.  Added seated knee flex stretch and calf stretch with belt;  pt reports he is familiar with these exercises and can add to HEP     Plan: [x] Continue current frequency toward long and short term goals.    [x] Specific Instructions for subsequent treatments: Work on left knee ROM and LE strength to reduce pain and improve mobility so he may resume all prior activities. Increase reps when appropriate    Time In:  7:53 am     Time out: 8:42 am    Electronically signed by:  KRIS HOLLEY PTA

## 2025-02-05 ENCOUNTER — OFFICE VISIT (OUTPATIENT)
Dept: NEUROLOGY | Age: 65
End: 2025-02-05
Payer: COMMERCIAL

## 2025-02-05 DIAGNOSIS — G43.711 CHRONIC MIGRAINE WITHOUT AURA, WITH INTRACTABLE MIGRAINE, SO STATED, WITH STATUS MIGRAINOSUS: Primary | ICD-10-CM

## 2025-02-05 PROCEDURE — 64615 CHEMODENERV MUSC MIGRAINE: CPT | Performed by: PSYCHIATRY & NEUROLOGY

## 2025-02-05 NOTE — PROGRESS NOTES
[facility] [place] [address]  [place] [address]  Ph: [phone] or [phone]  FAX: [phone]          Indication for treatment: Chronic migraine without aura, intractable, with status migrainosus (G 43.625)  Consent form was signed. Please see the associated scanned paper.   Potential risks and benefits for the procedure were explained to the patient.   Procedure: 30-gauge half inch needle was used and 200 U vial Botox was prepared with 4ml 0.9% NS.155 units of Botox were used and 45 units of Botox were discarded.   Botox was injected at 31 different sites as follows:     Order  Muscle  Units injected    A  Corrugator1  10 units at 2 div sites    B  Procerus  5 Units in 1 site    C  Frontalis¹  20 Units div. 4 sites    D  Temporalis¹  40 Units div 8 site    E  Occipitalis¹  30 Units div. 6 sites    F  Cervical paraspinal muscles¹  20 Units div 4 sites    G  Trapezius¹  30 Units div 6 sites    Total Dose  155 Units div 31 sites    1 Dose distributed bilaterally  Blood loss: Less than 1 cc  BOTOX Units used: 155 Units  BOTOX units discarded: 45 Units   Complications: none

## 2025-02-06 ENCOUNTER — HOSPITAL ENCOUNTER (OUTPATIENT)
Age: 65
Setting detail: THERAPIES SERIES
Discharge: HOME | End: 2025-02-06
Attending: ORTHOPAEDIC SURGERY
Payer: COMMERCIAL

## 2025-02-06 PROCEDURE — 97140 MANUAL THERAPY 1/> REGIONS: CPT

## 2025-02-06 PROCEDURE — 97110 THERAPEUTIC EXERCISES: CPT

## 2025-02-06 NOTE — FLOWSHEET NOTE
Progressing; 0-105                    Patient goals: full ROM in knee and pain relief       Pt. Education:  [x] Yes  [] No  [x] Reviewed Prior HEP/Ed  Method of Education: [x] Verbal  [x] Demo  [] Written  Comprehension of Education:  [x] Verbalizes understanding.  [] Demonstrates understanding.  [] Needs review.  [x] Demonstrates/verbalizes HEP/Ed previously given.  Added seated knee flex stretch and calf stretch with belt;  pt reports he is familiar with these exercises and can add to HEP     Plan: [x] Continue current frequency toward long and short term goals.    [x] Specific Instructions for subsequent treatments: Work on left knee ROM and LE strength to reduce pain and improve mobility so he may resume all prior activities. Increase reps when appropriate    Time In:  8:39  am     Time out: 9:32 am    Electronically signed by:  Alex Gonzalez PT

## 2025-02-13 ENCOUNTER — HOSPITAL ENCOUNTER (OUTPATIENT)
Age: 65
Setting detail: THERAPIES SERIES
Discharge: HOME | End: 2025-02-13
Attending: ORTHOPAEDIC SURGERY
Payer: COMMERCIAL

## 2025-02-13 PROCEDURE — 97140 MANUAL THERAPY 1/> REGIONS: CPT

## 2025-02-13 PROCEDURE — 97110 THERAPEUTIC EXERCISES: CPT

## 2025-02-13 NOTE — FLOWSHEET NOTE
[] [facility]  Outpatient Rehabilitation &  Therapy  [address]  [phone]  [phone] [] [facility]  Outpatient Rehabilitation &  Therapy  [address]  P: [phone]  F: [phone] [] [facility]  Outpatient Rehabilitation &  Therapy  [address]  P: [phone]  F: [phone] [] [facility]  Outpatient Rehabilitation &  Therapy  [address]  [phone]  [phone] [] [facility]  Outpatient Rehabilitation &  Therapy  [address]   P: [phone]  F: [phone]  [] [facility]  Outpatient Rehabilitation &  Therapy  [address]  P: [phone]  F: [phone] [x] [facility]  Outpatient Rehabilitation &  Therapy  [address]  P: [phone]  F: [phone] [] [facility]  Outpatient Rehabilitation &  Therapy  [address]  P: [phone]  F: [phone] [] [facility]  Outpatient Rehabilitation &  Therapy  [address]  P: [phone]  F: [phone]  [] [facility] Outpatient Rehabilitation &  Therapy  [address]  P: [phone]  F: [phone]     Physical Therapy Daily Treatment Note    Date:  2025  Patient Name:  Giovanny Hendricks    :  1960  MRN: [id number]  Physician: Rizwan Adkins                               Insurance: [employer], based on medical necessity  Medical Diagnosis: z98.890, z96.652                        Rehab Codes: m25.662, m25.562, m62.81, r26.89  Onset date: 24               Next 's appt.: 24  Visit# / total visits:      Cancels/No Shows: 0/0      Subjective:    Pain:  [x] Yes  [] No Location: Left knee N/A  Pain Rating: (0-10 scale) 5/10 knee;  Pt reports he is sore now but he shoveled his driveway today and that made him sore.  He notes

## 2025-02-20 ENCOUNTER — OFFICE VISIT (OUTPATIENT)
Dept: ORTHOPEDIC SURGERY | Age: 65
End: 2025-02-20

## 2025-02-20 VITALS — BODY MASS INDEX: 28.49 KG/M2 | WEIGHT: 222 LBS | HEIGHT: 74 IN

## 2025-02-20 DIAGNOSIS — Z96.652 S/P TOTAL KNEE ARTHROPLASTY, LEFT: Primary | ICD-10-CM

## 2025-02-20 ASSESSMENT — ENCOUNTER SYMPTOMS
EYE DISCHARGE: 0
ROS SKIN COMMENTS: NEGATIVE FOR RASH
ABDOMINAL PAIN: 0
SHORTNESS OF BREATH: 0

## 2025-02-20 NOTE — PROGRESS NOTES
[facility], [facility] ORTHOPEDICS AND SPORTS MEDICINE  [address] [place] [address]  [address]  [place] [id number]  Dept: [phone]  Dept Fax: [phone]        Postoperative follow-up note    Subjective:     History of Present Illness  The patient is a 64-year-old male who presents for follow-up after left total knee arthroplasty on 11/26/2024.    He reports experiencing stiffness in his left knee, which he notes is gradually improving. He has been informed that the primary source of his discomfort is the hamstring tendon, located at the posterolateral aspect of the knee. He experiences sharp pain when pulling the knee forward or standing and lifting. He also reports a popping sensation in the knee during movement. Despite these issues, he believes his condition is improving. He attends physical therapy sessions once a week, primarily for measurement purposes, and continues his exercises at home. He uses a cane for support when walking extensively to avoid overloading the leg. He applies ice to the knee 3 to 4 times daily, particularly at the end of the day after significant activity. He notes minimal swelling in the morning, which increases as the day progresses. He also reports weakness in his quadriceps. He is considering resuming gym activities, specifically cycling without resistance, as he found this beneficial during his physical therapy sessions.    Supplemental Information  He has a history of ankle and hip issues from an accident, with the ankle currently causing the most problems. He had surgery on his ankle, hip, and knee about 15 to 17 years ago.  .    Chief Complaint   Patient presents with    Post-Op Check     DOS:11/26/24  L TKA       Review of Systems   Constitutional:  Positive for activity change. Negative for fever.   HENT:  Negative for dental problem.    Eyes:  Negative for discharge.   Respiratory:  Negative for shortness of breath.

## 2025-02-21 DIAGNOSIS — E78.5 DYSLIPIDEMIA: ICD-10-CM

## 2025-02-21 NOTE — TELEPHONE ENCOUNTER
Giovanny Hendricks is calling to request a refill on the following medication(s):    Medication Request:  Requested Prescriptions     Pending Prescriptions Disp Refills    atorvastatin (LIPITOR) 40 MG tablet 90 tablet 3     Sig: Take 1 tablet by mouth daily       Last Visit Date (If Applicable):  11/14/2024    Next Visit Date:    2/27/2025

## 2025-02-23 RX ORDER — ATORVASTATIN CALCIUM 40 MG/1
40 TABLET, FILM COATED ORAL DAILY
Qty: 90 TABLET | Refills: 1 | Status: SHIPPED | OUTPATIENT
Start: 2025-02-23

## 2025-02-27 ENCOUNTER — OFFICE VISIT (OUTPATIENT)
Dept: FAMILY MEDICINE CLINIC | Age: 65
End: 2025-02-27
Payer: COMMERCIAL

## 2025-02-27 VITALS
HEART RATE: 92 BPM | OXYGEN SATURATION: 97 % | WEIGHT: 224 LBS | TEMPERATURE: 97.2 F | RESPIRATION RATE: 16 BRPM | DIASTOLIC BLOOD PRESSURE: 62 MMHG | HEIGHT: 74 IN | BODY MASS INDEX: 28.75 KG/M2 | SYSTOLIC BLOOD PRESSURE: 118 MMHG

## 2025-02-27 DIAGNOSIS — Z13.31 DEPRESSION SCREENING: ICD-10-CM

## 2025-02-27 DIAGNOSIS — Z00.00 ROUTINE HEALTH MAINTENANCE: Primary | ICD-10-CM

## 2025-02-27 DIAGNOSIS — I10 ESSENTIAL HYPERTENSION: ICD-10-CM

## 2025-02-27 DIAGNOSIS — Z12.11 COLON CANCER SCREENING: ICD-10-CM

## 2025-02-27 DIAGNOSIS — Z93.3 STATUS POST HARTMANN'S PROCEDURE (HCC): ICD-10-CM

## 2025-02-27 DIAGNOSIS — G43.719 INTRACTABLE CHRONIC MIGRAINE WITHOUT AURA AND WITHOUT STATUS MIGRAINOSUS: ICD-10-CM

## 2025-02-27 DIAGNOSIS — Z13.1 DIABETES MELLITUS SCREENING: ICD-10-CM

## 2025-02-27 DIAGNOSIS — E55.9 VITAMIN D INSUFFICIENCY: ICD-10-CM

## 2025-02-27 DIAGNOSIS — Z12.5 PROSTATE CANCER SCREENING: ICD-10-CM

## 2025-02-27 DIAGNOSIS — E78.5 DYSLIPIDEMIA: ICD-10-CM

## 2025-02-27 PROCEDURE — 3074F SYST BP LT 130 MM HG: CPT | Performed by: FAMILY MEDICINE

## 2025-02-27 PROCEDURE — 99396 PREV VISIT EST AGE 40-64: CPT | Performed by: FAMILY MEDICINE

## 2025-02-27 PROCEDURE — 3078F DIAST BP <80 MM HG: CPT | Performed by: FAMILY MEDICINE

## 2025-02-27 SDOH — ECONOMIC STABILITY: FOOD INSECURITY: WITHIN THE PAST 12 MONTHS, THE FOOD YOU BOUGHT JUST DIDN'T LAST AND YOU DIDN'T HAVE MONEY TO GET MORE.: NEVER TRUE

## 2025-02-27 SDOH — ECONOMIC STABILITY: FOOD INSECURITY: WITHIN THE PAST 12 MONTHS, YOU WORRIED THAT YOUR FOOD WOULD RUN OUT BEFORE YOU GOT MONEY TO BUY MORE.: NEVER TRUE

## 2025-02-27 ASSESSMENT — ENCOUNTER SYMPTOMS
ALLERGIC/IMMUNOLOGIC NEGATIVE: 1
GASTROINTESTINAL NEGATIVE: 1
RESPIRATORY NEGATIVE: 1
EYES NEGATIVE: 1

## 2025-02-27 ASSESSMENT — PATIENT HEALTH QUESTIONNAIRE - PHQ9
2. FEELING DOWN, DEPRESSED OR HOPELESS: NOT AT ALL
SUM OF ALL RESPONSES TO PHQ QUESTIONS 1-9: 0
SUM OF ALL RESPONSES TO PHQ QUESTIONS 1-9: 0
1. LITTLE INTEREST OR PLEASURE IN DOING THINGS: NOT AT ALL
SUM OF ALL RESPONSES TO PHQ QUESTIONS 1-9: 0
SUM OF ALL RESPONSES TO PHQ QUESTIONS 1-9: 0
SUM OF ALL RESPONSES TO PHQ9 QUESTIONS 1 & 2: 0

## 2025-02-27 NOTE — PROGRESS NOTES
[facility]     Date of Visit:  2025  Patient Name: Giovanny Hendricks   Patient :  1960     CHIEF COMPLAINT:     Giovanny Hendricks is a 64 y.o. male who presents today for an general visit to be evaluated for the following condition(s):    Chief Complaint   Patient presents with    General Check Up / Annual Physical     Due for routine health maintenance items.  Will order this afternoon.    Follow-up     The patient is a 64-year-old male who presents for follow-up after left total knee arthroplasty on 2024.       HISTORY OF PRESENT ILLNESS:         Follows with:  Ortho: Rizwan Adkins DO  Neurology: Antelmo Quesada MD  GI: Osorio Erwin MD    Available medical records / info reviewed today.  Chart update this afternoon.         REVIEW OF SYSTEMS:        Review of Systems   Constitutional: Negative.    HENT: Negative.     Eyes: Negative.    Respiratory: Negative.     Cardiovascular: Negative.    Gastrointestinal: Negative.    Endocrine: Negative.    Genitourinary: Negative.    Musculoskeletal:  Positive for arthralgias.   Skin: Negative.    Allergic/Immunologic: Negative.    Neurological: Negative.    Hematological: Negative.    Psychiatric/Behavioral: Negative.          REVIEWED INFORMATION      Current Outpatient Medications   Medication Sig Dispense Refill    Onabotulinumtoxin A (BOTOX) 200 units injection Inject IM into 31 FDA approved sites on head, face and neck every 90 days 1 each 4    QULIPTA 60 MG TABS TAKE 1 TABLET BY MOUTH DAILY 30 tablet 5    lisinopril-hydroCHLOROthiazide (PRINZIDE;ZESTORETIC) 20-12.5 MG per tablet Take 2 tablets by mouth daily 180 tablet 1    pantoprazole (PROTONIX) 40 MG tablet TAKE 1 TABLET BY MOUTH 2 TIMES A DAY BEFORE MEALS 60 tablet 1    rizatriptan (MAXALT) 10 MG tablet Take one tablet at onset of migraine. May repeat in 2 hours if needed. Do not exceed 2 doses in a 24 hour period. 12 tablet 2    atorvastatin (LIPITOR) 40 MG tablet

## 2025-03-03 DIAGNOSIS — I10 ESSENTIAL HYPERTENSION: ICD-10-CM

## 2025-03-03 RX ORDER — LISINOPRIL AND HYDROCHLOROTHIAZIDE 12.5; 2 MG/1; MG/1
2 TABLET ORAL DAILY
Qty: 180 TABLET | Refills: 1 | Status: SHIPPED | OUTPATIENT
Start: 2025-03-03

## 2025-03-07 ENCOUNTER — HOSPITAL ENCOUNTER (OUTPATIENT)
Age: 65
Setting detail: SPECIMEN
Discharge: HOME | End: 2025-03-07

## 2025-03-07 LAB
25(OH)D3 SERPL-MCNC: 32.5 NG/ML (ref 30–100)
ALBUMIN SERPL-MCNC: 4.5 G/DL (ref 3.5–5.2)
ALBUMIN/GLOB SERPL: 1.6 {RATIO} (ref 1–2.5)
ALP SERPL-CCNC: 108 U/L (ref 40–129)
ALT SERPL-CCNC: 20 U/L (ref 10–50)
ANION GAP SERPL CALCULATED.3IONS-SCNC: 11 MMOL/L (ref 9–16)
AST SERPL-CCNC: 21 U/L (ref 10–50)
BASOPHILS # BLD: 0.05 K/UL (ref 0–0.2)
BASOPHILS NFR BLD: 1 % (ref 0–2)
BILIRUB SERPL-MCNC: 0.6 MG/DL (ref 0–1.2)
BILIRUB UR QL STRIP: NEGATIVE
BUN SERPL-MCNC: 11 MG/DL (ref 8–23)
CALCIUM SERPL-MCNC: 9.5 MG/DL (ref 8.6–10.4)
CHLORIDE SERPL-SCNC: 97 MMOL/L (ref 98–107)
CHOLEST SERPL-MCNC: 198 MG/DL (ref 0–199)
CHOLESTEROL/HDL RATIO: 4.2
CLARITY UR: CLEAR
CO2 SERPL-SCNC: 28 MMOL/L (ref 20–31)
COLOR UR: YELLOW
COMMENT: NORMAL
CREAT SERPL-MCNC: 0.9 MG/DL (ref 0.7–1.2)
EOSINOPHIL # BLD: 0.28 K/UL (ref 0–0.44)
EOSINOPHILS RELATIVE PERCENT: 6 % (ref 1–4)
ERYTHROCYTE [DISTWIDTH] IN BLOOD BY AUTOMATED COUNT: 13.1 % (ref 11.8–14.4)
EST. AVERAGE GLUCOSE BLD GHB EST-MCNC: 103 MG/DL
GFR, ESTIMATED: >90 ML/MIN/1.73M2
GLUCOSE SERPL-MCNC: 89 MG/DL (ref 74–99)
GLUCOSE UR STRIP-MCNC: NEGATIVE MG/DL
HBA1C MFR BLD: 5.2 % (ref 4–6)
HCT VFR BLD AUTO: 46.7 % (ref 40.7–50.3)
HDLC SERPL-MCNC: 47 MG/DL
HGB BLD-MCNC: 15.1 G/DL (ref 13–17)
HGB UR QL STRIP.AUTO: NEGATIVE
IMM GRANULOCYTES # BLD AUTO: <0.03 K/UL (ref 0–0.3)
IMM GRANULOCYTES NFR BLD: 0 %
KETONES UR STRIP-MCNC: NEGATIVE MG/DL
LDLC SERPL CALC-MCNC: 122 MG/DL (ref 0–100)
LEUKOCYTE ESTERASE UR QL STRIP: NEGATIVE
LYMPHOCYTES NFR BLD: 1.47 K/UL (ref 1.1–3.7)
LYMPHOCYTES RELATIVE PERCENT: 32 % (ref 24–43)
MAGNESIUM SERPL-MCNC: 2.1 MG/DL (ref 1.6–2.4)
MCH RBC QN AUTO: 28.1 PG (ref 25.2–33.5)
MCHC RBC AUTO-ENTMCNC: 32.3 G/DL (ref 28.4–34.8)
MCV RBC AUTO: 86.8 FL (ref 82.6–102.9)
MONOCYTES NFR BLD: 0.55 K/UL (ref 0.1–1.2)
MONOCYTES NFR BLD: 12 % (ref 3–12)
NEUTROPHILS NFR BLD: 49 % (ref 36–65)
NEUTS SEG NFR BLD: 2.28 K/UL (ref 1.5–8.1)
NITRITE UR QL STRIP: NEGATIVE
NRBC BLD-RTO: 0 PER 100 WBC
PH UR STRIP: 7 [PH] (ref 5–8)
PLATELET # BLD AUTO: 223 K/UL (ref 138–453)
PMV BLD AUTO: 9.3 FL (ref 8.1–13.5)
POTASSIUM SERPL-SCNC: 4.2 MMOL/L (ref 3.7–5.3)
PROT SERPL-MCNC: 7.3 G/DL (ref 6.6–8.7)
PROT UR STRIP-MCNC: NEGATIVE MG/DL
PSA SERPL-MCNC: 3.28 NG/ML (ref 0–4)
RBC # BLD AUTO: 5.38 M/UL (ref 4.21–5.77)
SODIUM SERPL-SCNC: 136 MMOL/L (ref 136–145)
SP GR UR STRIP: 1.01 (ref 1–1.03)
TRIGL SERPL-MCNC: 144 MG/DL
TSH SERPL DL<=0.05 MIU/L-ACNC: 2.2 UIU/ML (ref 0.27–4.2)
UROBILINOGEN UR STRIP-ACNC: NORMAL EU/DL (ref 0–1)
VLDLC SERPL CALC-MCNC: 29 MG/DL (ref 1–30)
WBC OTHER # BLD: 4.6 K/UL (ref 3.5–11.3)

## 2025-03-09 ENCOUNTER — RESULTS FOLLOW-UP (OUTPATIENT)
Age: 65
End: 2025-03-09

## 2025-03-19 RX ORDER — PANTOPRAZOLE SODIUM 40 MG/1
TABLET, DELAYED RELEASE ORAL
Qty: 60 TABLET | Refills: 1 | Status: SHIPPED | OUTPATIENT
Start: 2025-03-19

## 2025-04-02 NOTE — ASSESSMENT & PLAN NOTE
Monitored by specialist- no acute findings meriting change in the plan   Addended by: LAKSHMI ROWLEY on: 4/2/2025 09:01 AM     Modules accepted: Orders

## 2025-04-29 ENCOUNTER — TELEPHONE (OUTPATIENT)
Dept: NEUROLOGY | Age: 65
End: 2025-04-29

## 2025-05-13 ENCOUNTER — OFFICE VISIT (OUTPATIENT)
Dept: FAMILY MEDICINE CLINIC | Age: 65
End: 2025-05-13
Payer: COMMERCIAL

## 2025-05-13 VITALS
HEART RATE: 71 BPM | WEIGHT: 218 LBS | HEIGHT: 74 IN | DIASTOLIC BLOOD PRESSURE: 78 MMHG | SYSTOLIC BLOOD PRESSURE: 118 MMHG | TEMPERATURE: 97.2 F | OXYGEN SATURATION: 97 % | BODY MASS INDEX: 27.98 KG/M2

## 2025-05-13 DIAGNOSIS — G89.29 CHRONIC LEFT-SIDED LOW BACK PAIN WITH LEFT-SIDED SCIATICA: ICD-10-CM

## 2025-05-13 DIAGNOSIS — M51.16 LUMBAR DISC DISEASE WITH RADICULOPATHY: Primary | ICD-10-CM

## 2025-05-13 DIAGNOSIS — M54.42 CHRONIC LEFT-SIDED LOW BACK PAIN WITH LEFT-SIDED SCIATICA: ICD-10-CM

## 2025-05-13 DIAGNOSIS — M54.42 ACUTE LEFT-SIDED LOW BACK PAIN WITH LEFT-SIDED SCIATICA: ICD-10-CM

## 2025-05-13 PROCEDURE — 3074F SYST BP LT 130 MM HG: CPT | Performed by: NURSE PRACTITIONER

## 2025-05-13 PROCEDURE — 3078F DIAST BP <80 MM HG: CPT | Performed by: NURSE PRACTITIONER

## 2025-05-13 PROCEDURE — 96372 THER/PROPH/DIAG INJ SC/IM: CPT | Performed by: NURSE PRACTITIONER

## 2025-05-13 PROCEDURE — 99213 OFFICE O/P EST LOW 20 MIN: CPT | Performed by: NURSE PRACTITIONER

## 2025-05-13 RX ORDER — OXYCODONE AND ACETAMINOPHEN 5; 325 MG/1; MG/1
1 TABLET ORAL EVERY 8 HOURS PRN
Qty: 21 TABLET | Refills: 0 | Status: SHIPPED | OUTPATIENT
Start: 2025-05-13 | End: 2025-05-20

## 2025-05-13 RX ORDER — PREDNISONE 20 MG/1
20 TABLET ORAL 2 TIMES DAILY
Qty: 10 TABLET | Refills: 0 | Status: SHIPPED | OUTPATIENT
Start: 2025-05-13 | End: 2025-05-18

## 2025-05-13 RX ORDER — METHYLPREDNISOLONE SODIUM SUCCINATE 40 MG/ML
40 INJECTION INTRAMUSCULAR; INTRAVENOUS ONCE
Status: DISCONTINUED | OUTPATIENT
Start: 2025-05-13 | End: 2025-05-13

## 2025-05-13 RX ORDER — KETOROLAC TROMETHAMINE 30 MG/ML
60 INJECTION, SOLUTION INTRAMUSCULAR; INTRAVENOUS ONCE
Status: COMPLETED | OUTPATIENT
Start: 2025-05-13 | End: 2025-05-13

## 2025-05-13 RX ADMIN — KETOROLAC TROMETHAMINE 60 MG: 30 INJECTION, SOLUTION INTRAMUSCULAR; INTRAVENOUS at 13:03

## 2025-05-13 ASSESSMENT — ENCOUNTER SYMPTOMS
ALLERGIC/IMMUNOLOGIC NEGATIVE: 1
BACK PAIN: 1
RESPIRATORY NEGATIVE: 1
CHEST TIGHTNESS: 0
COUGH: 0
SHORTNESS OF BREATH: 0
EYES NEGATIVE: 1
ABDOMINAL PAIN: 0
GASTROINTESTINAL NEGATIVE: 1

## 2025-05-13 NOTE — PROGRESS NOTES
[facility] [place]     Date of Visit:  2025  Patient Name: Giovanny Hendricks   Patient :  1960     CHIEF COMPLAINT:     Giovanny Hendricks is a 65 y.o. male who presents today for an general visit to be evaluated for the following condition(s):  Chief Complaint   Patient presents with    Back Pain       HISTORY OF PRESENT ILLNESS:       HPI     Here today for concerns of back pain.  MRI from  shows mild disc bulges and stenosis of lumbar spinal canal.  He was referred to Neurosurgery, Khushbu Olivo CNP where they recommended to monitor.  He also was referred to pain management at that time.     Reports he threw his back out doing yard work this past weekend. This happens intermittently patient reports.  He has done PT with success in the past, but he travels for work and is due to go out of town in a few days for work.  He does the exercises at home, which have helped slightly.  Denies any bladder or bowel incontinence.    REVIEW OF SYSTEMS:      Review of Systems   Constitutional: Negative.  Negative for fatigue and fever.   HENT: Negative.  Negative for ear pain and tinnitus.    Eyes: Negative.  Negative for visual disturbance.   Respiratory: Negative.  Negative for cough, chest tightness and shortness of breath.    Cardiovascular: Negative.  Negative for chest pain.   Gastrointestinal: Negative.  Negative for abdominal pain.   Endocrine: Negative.    Genitourinary: Negative.  Negative for dysuria, frequency and urgency.   Musculoskeletal:  Positive for back pain (chronic). Negative for arthralgias and myalgias.   Skin: Negative.    Allergic/Immunologic: Negative.    Neurological: Negative.  Negative for dizziness and light-headedness.   Hematological: Negative.    Psychiatric/Behavioral: Negative.  Negative for behavioral problems and hallucinations.         REVIEWED INFORMATION      Current Outpatient Medications   Medication Sig Dispense Refill    predniSONE (DELTASONE) 20 MG tablet Take 1 tablet

## 2025-05-15 RX ORDER — PANTOPRAZOLE SODIUM 40 MG/1
TABLET, DELAYED RELEASE ORAL
Qty: 60 TABLET | Refills: 1 | Status: SHIPPED | OUTPATIENT
Start: 2025-05-15

## 2025-06-04 ENCOUNTER — OFFICE VISIT (OUTPATIENT)
Dept: NEUROLOGY | Age: 65
End: 2025-06-04
Payer: COMMERCIAL

## 2025-06-04 DIAGNOSIS — G43.711 CHRONIC MIGRAINE WITHOUT AURA, WITH INTRACTABLE MIGRAINE, SO STATED, WITH STATUS MIGRAINOSUS: Primary | ICD-10-CM

## 2025-06-04 PROCEDURE — 64615 CHEMODENERV MUSC MIGRAINE: CPT | Performed by: PSYCHIATRY & NEUROLOGY

## 2025-06-04 NOTE — PROGRESS NOTES
[facility] [place] [address]  [place] [address]  Ph: [phone] or [phone]  FAX: [phone]          Indication for treatment: Chronic migraine without aura, intractable, with status migrainosus (G 43.542)  Consent form was signed. Please see the associated scanned paper.   Potential risks and benefits for the procedure were explained to the patient.   Procedure: 30-gauge half inch needle was used and 200 U vial Botox was prepared with 4ml 0.9% NS.155 units of Botox were used and 45 units of Botox were discarded.   Botox was injected at 31 different sites as follows:     Order  Muscle  Units injected    A  Corrugator1  10 units at 2 div sites    B  Procerus  5 Units in 1 site    C  Frontalis¹  20 Units div. 4 sites    D  Temporalis¹  40 Units div 8 site    E  Occipitalis¹  30 Units div. 6 sites    F  Cervical paraspinal muscles¹  20 Units div 4 sites    G  Trapezius¹  30 Units div 6 sites    Total Dose  155 Units div 31 sites    1 Dose distributed bilaterally  Blood loss: Less than 1 cc  BOTOX Units used: 155 Units  BOTOX units discarded: 45 Units   Complications: none

## 2025-06-24 PROBLEM — I82.90 VTE (VENOUS THROMBOEMBOLISM): Status: RESOLVED | Noted: 2021-02-17 | Resolved: 2025-06-24

## 2025-06-24 PROBLEM — K25.9 GASTRIC ULCER: Status: RESOLVED | Noted: 2024-10-10 | Resolved: 2025-06-24

## 2025-06-24 PROBLEM — E66.3 OVERWEIGHT (BMI 25.0-29.9): Status: RESOLVED | Noted: 2022-02-23 | Resolved: 2025-06-24

## 2025-06-24 PROBLEM — K22.2 ESOPHAGEAL STRICTURE: Status: RESOLVED | Noted: 2024-10-10 | Resolved: 2025-06-24

## 2025-07-13 DIAGNOSIS — G43.719 INTRACTABLE CHRONIC MIGRAINE WITHOUT AURA AND WITHOUT STATUS MIGRAINOSUS: ICD-10-CM

## 2025-07-14 RX ORDER — ATOGEPANT 60 MG/1
1 TABLET ORAL DAILY
Qty: 30 TABLET | Refills: 0 | Status: SHIPPED | OUTPATIENT
Start: 2025-07-14

## 2025-07-14 RX ORDER — PANTOPRAZOLE SODIUM 40 MG/1
TABLET, DELAYED RELEASE ORAL
Qty: 60 TABLET | Refills: 1 | Status: SHIPPED | OUTPATIENT
Start: 2025-07-14

## 2025-07-14 NOTE — TELEPHONE ENCOUNTER
Please send for Dr. Quesada    Pharmacy requesting refill of Qulipta 60 mg.      Medication active on med list yes      Date of last Rx: 1/15/2025 with 5 refills          verified by SB, RMA      Date of last appointment 6/4/25 (Botox)    Next Visit Date: 10/8/2025

## 2025-08-14 ENCOUNTER — ANESTHESIA EVENT (OUTPATIENT)
Dept: OPERATING ROOM | Age: 65
End: 2025-08-14
Payer: COMMERCIAL

## 2025-08-14 ENCOUNTER — TELEPHONE (OUTPATIENT)
Dept: NEUROLOGY | Age: 65
End: 2025-08-14

## 2025-08-14 RX ORDER — SODIUM CHLORIDE 0.9 % (FLUSH) 0.9 %
5-40 SYRINGE (ML) INJECTION PRN
Status: CANCELLED | OUTPATIENT
Start: 2025-08-14

## 2025-08-14 RX ORDER — SODIUM CHLORIDE 9 MG/ML
INJECTION, SOLUTION INTRAVENOUS PRN
Status: CANCELLED | OUTPATIENT
Start: 2025-08-14

## 2025-08-14 RX ORDER — LIDOCAINE HYDROCHLORIDE 10 MG/ML
1 INJECTION, SOLUTION EPIDURAL; INFILTRATION; INTRACAUDAL; PERINEURAL
Status: CANCELLED | OUTPATIENT
Start: 2025-08-14

## 2025-08-14 RX ORDER — SODIUM CHLORIDE, SODIUM LACTATE, POTASSIUM CHLORIDE, CALCIUM CHLORIDE 600; 310; 30; 20 MG/100ML; MG/100ML; MG/100ML; MG/100ML
INJECTION, SOLUTION INTRAVENOUS CONTINUOUS
Status: CANCELLED | OUTPATIENT
Start: 2025-08-14

## 2025-08-14 RX ORDER — SODIUM CHLORIDE 0.9 % (FLUSH) 0.9 %
5-40 SYRINGE (ML) INJECTION EVERY 12 HOURS SCHEDULED
Status: CANCELLED | OUTPATIENT
Start: 2025-08-14

## 2025-08-15 ENCOUNTER — ANESTHESIA (OUTPATIENT)
Dept: OPERATING ROOM | Age: 65
End: 2025-08-15
Payer: COMMERCIAL

## 2025-08-15 ENCOUNTER — HOSPITAL ENCOUNTER (OUTPATIENT)
Age: 65
Setting detail: OUTPATIENT SURGERY
Discharge: HOME | End: 2025-08-15
Attending: STUDENT IN AN ORGANIZED HEALTH CARE EDUCATION/TRAINING PROGRAM | Admitting: STUDENT IN AN ORGANIZED HEALTH CARE EDUCATION/TRAINING PROGRAM
Payer: COMMERCIAL

## 2025-08-15 VITALS
WEIGHT: 217 LBS | HEIGHT: 74 IN | RESPIRATION RATE: 17 BRPM | HEART RATE: 58 BPM | DIASTOLIC BLOOD PRESSURE: 81 MMHG | OXYGEN SATURATION: 100 % | SYSTOLIC BLOOD PRESSURE: 121 MMHG | TEMPERATURE: 97.6 F | BODY MASS INDEX: 27.85 KG/M2

## 2025-08-15 DIAGNOSIS — Z12.11 SCREEN FOR COLON CANCER: ICD-10-CM

## 2025-08-15 PROCEDURE — 3700000001 HC ADD 15 MINUTES (ANESTHESIA): Performed by: STUDENT IN AN ORGANIZED HEALTH CARE EDUCATION/TRAINING PROGRAM

## 2025-08-15 PROCEDURE — 2580000003 HC RX 258: Performed by: SPECIALIST

## 2025-08-15 PROCEDURE — 88305 TISSUE EXAM BY PATHOLOGIST: CPT

## 2025-08-15 PROCEDURE — 3700000000 HC ANESTHESIA ATTENDED CARE: Performed by: STUDENT IN AN ORGANIZED HEALTH CARE EDUCATION/TRAINING PROGRAM

## 2025-08-15 PROCEDURE — 6360000002 HC RX W HCPCS: Performed by: SPECIALIST

## 2025-08-15 PROCEDURE — 7100000010 HC PHASE II RECOVERY - FIRST 15 MIN: Performed by: STUDENT IN AN ORGANIZED HEALTH CARE EDUCATION/TRAINING PROGRAM

## 2025-08-15 PROCEDURE — 7100000011 HC PHASE II RECOVERY - ADDTL 15 MIN: Performed by: STUDENT IN AN ORGANIZED HEALTH CARE EDUCATION/TRAINING PROGRAM

## 2025-08-15 PROCEDURE — 2709999900 HC NON-CHARGEABLE SUPPLY: Performed by: STUDENT IN AN ORGANIZED HEALTH CARE EDUCATION/TRAINING PROGRAM

## 2025-08-15 PROCEDURE — 3609010300 HC COLONOSCOPY W/BIOPSY SINGLE/MULTIPLE: Performed by: STUDENT IN AN ORGANIZED HEALTH CARE EDUCATION/TRAINING PROGRAM

## 2025-08-15 RX ORDER — LABETALOL HYDROCHLORIDE 5 MG/ML
10 INJECTION, SOLUTION INTRAVENOUS
Status: DISCONTINUED | OUTPATIENT
Start: 2025-08-15 | End: 2025-08-15 | Stop reason: HOSPADM

## 2025-08-15 RX ORDER — SODIUM CHLORIDE 9 MG/ML
INJECTION, SOLUTION INTRAVENOUS PRN
Status: DISCONTINUED | OUTPATIENT
Start: 2025-08-15 | End: 2025-08-15 | Stop reason: HOSPADM

## 2025-08-15 RX ORDER — SODIUM CHLORIDE 0.9 % (FLUSH) 0.9 %
5-40 SYRINGE (ML) INJECTION PRN
Status: DISCONTINUED | OUTPATIENT
Start: 2025-08-15 | End: 2025-08-15 | Stop reason: HOSPADM

## 2025-08-15 RX ORDER — SODIUM CHLORIDE 0.9 % (FLUSH) 0.9 %
5-40 SYRINGE (ML) INJECTION EVERY 12 HOURS SCHEDULED
Status: DISCONTINUED | OUTPATIENT
Start: 2025-08-15 | End: 2025-08-15 | Stop reason: HOSPADM

## 2025-08-15 RX ORDER — PROCHLORPERAZINE EDISYLATE 5 MG/ML
5 INJECTION INTRAMUSCULAR; INTRAVENOUS
Status: DISCONTINUED | OUTPATIENT
Start: 2025-08-15 | End: 2025-08-15 | Stop reason: HOSPADM

## 2025-08-15 RX ORDER — LIDOCAINE HYDROCHLORIDE 10 MG/ML
INJECTION, SOLUTION EPIDURAL; INFILTRATION; INTRACAUDAL; PERINEURAL
Status: DISCONTINUED | OUTPATIENT
Start: 2025-08-15 | End: 2025-08-15 | Stop reason: SDUPTHER

## 2025-08-15 RX ORDER — PROPOFOL 10 MG/ML
INJECTION, EMULSION INTRAVENOUS
Status: DISCONTINUED | OUTPATIENT
Start: 2025-08-15 | End: 2025-08-15 | Stop reason: SDUPTHER

## 2025-08-15 RX ORDER — HYDRALAZINE HYDROCHLORIDE 20 MG/ML
10 INJECTION INTRAMUSCULAR; INTRAVENOUS
Status: DISCONTINUED | OUTPATIENT
Start: 2025-08-15 | End: 2025-08-15 | Stop reason: HOSPADM

## 2025-08-15 RX ORDER — SODIUM CHLORIDE, SODIUM LACTATE, POTASSIUM CHLORIDE, CALCIUM CHLORIDE 600; 310; 30; 20 MG/100ML; MG/100ML; MG/100ML; MG/100ML
INJECTION, SOLUTION INTRAVENOUS
Status: DISCONTINUED | OUTPATIENT
Start: 2025-08-15 | End: 2025-08-15 | Stop reason: SDUPTHER

## 2025-08-15 RX ADMIN — PROPOFOL 80 MG: 10 INJECTION, EMULSION INTRAVENOUS at 13:08

## 2025-08-15 RX ADMIN — SODIUM CHLORIDE, POTASSIUM CHLORIDE, SODIUM LACTATE AND CALCIUM CHLORIDE: 600; 310; 30; 20 INJECTION, SOLUTION INTRAVENOUS at 13:05

## 2025-08-15 RX ADMIN — PROPOFOL 150 MCG/KG/MIN: 10 INJECTION, EMULSION INTRAVENOUS at 13:09

## 2025-08-15 RX ADMIN — LIDOCAINE HYDROCHLORIDE 50 MG: 10 INJECTION, SOLUTION EPIDURAL; INFILTRATION; INTRACAUDAL; PERINEURAL at 13:08

## 2025-08-15 ASSESSMENT — PAIN SCALES - GENERAL
PAINLEVEL_OUTOF10: 0
PAINLEVEL_OUTOF10: 0

## 2025-08-15 ASSESSMENT — PAIN - FUNCTIONAL ASSESSMENT
PAIN_FUNCTIONAL_ASSESSMENT: 0-10
PAIN_FUNCTIONAL_ASSESSMENT: 0-10

## 2025-08-18 ENCOUNTER — PATIENT MESSAGE (OUTPATIENT)
Dept: NEUROLOGY | Age: 65
End: 2025-08-18

## 2025-08-18 DIAGNOSIS — G43.719 INTRACTABLE CHRONIC MIGRAINE WITHOUT AURA AND WITHOUT STATUS MIGRAINOSUS: ICD-10-CM

## 2025-08-19 LAB — SURGICAL PATHOLOGY REPORT: NORMAL

## 2025-08-25 ENCOUNTER — OFFICE VISIT (OUTPATIENT)
Dept: GASTROENTEROLOGY | Age: 65
End: 2025-08-25
Payer: COMMERCIAL

## 2025-08-25 VITALS
OXYGEN SATURATION: 99 % | BODY MASS INDEX: 28.49 KG/M2 | HEIGHT: 74 IN | HEART RATE: 66 BPM | SYSTOLIC BLOOD PRESSURE: 113 MMHG | WEIGHT: 222 LBS | DIASTOLIC BLOOD PRESSURE: 76 MMHG

## 2025-08-25 DIAGNOSIS — Z90.49 HISTORY OF PARTIAL COLECTOMY: ICD-10-CM

## 2025-08-25 DIAGNOSIS — K76.9 LIVER LESION: ICD-10-CM

## 2025-08-25 DIAGNOSIS — K20.0 EOSINOPHILIC ESOPHAGITIS: Primary | ICD-10-CM

## 2025-08-25 PROCEDURE — 3078F DIAST BP <80 MM HG: CPT | Performed by: STUDENT IN AN ORGANIZED HEALTH CARE EDUCATION/TRAINING PROGRAM

## 2025-08-25 PROCEDURE — 99214 OFFICE O/P EST MOD 30 MIN: CPT | Performed by: STUDENT IN AN ORGANIZED HEALTH CARE EDUCATION/TRAINING PROGRAM

## 2025-08-25 PROCEDURE — 3074F SYST BP LT 130 MM HG: CPT | Performed by: STUDENT IN AN ORGANIZED HEALTH CARE EDUCATION/TRAINING PROGRAM

## 2025-08-27 RX ORDER — ATOGEPANT 60 MG/1
1 TABLET ORAL DAILY
Qty: 90 TABLET | Refills: 1 | Status: SHIPPED | OUTPATIENT
Start: 2025-08-27 | End: 2026-02-23

## 2025-09-02 ENCOUNTER — TELEPHONE (OUTPATIENT)
Dept: NEUROLOGY | Age: 65
End: 2025-09-02

## 2025-09-03 ENCOUNTER — OFFICE VISIT (OUTPATIENT)
Dept: NEUROLOGY | Age: 65
End: 2025-09-03
Payer: COMMERCIAL

## 2025-09-03 DIAGNOSIS — G43.719 INTRACTABLE CHRONIC MIGRAINE WITHOUT AURA AND WITHOUT STATUS MIGRAINOSUS: Primary | ICD-10-CM

## 2025-09-03 PROCEDURE — 64615 CHEMODENERV MUSC MIGRAINE: CPT | Performed by: PSYCHIATRY & NEUROLOGY

## 2025-09-05 ENCOUNTER — OFFICE VISIT (OUTPATIENT)
Dept: FAMILY MEDICINE CLINIC | Age: 65
End: 2025-09-05

## 2025-09-05 VITALS
DIASTOLIC BLOOD PRESSURE: 80 MMHG | SYSTOLIC BLOOD PRESSURE: 128 MMHG | WEIGHT: 226 LBS | HEIGHT: 74 IN | HEART RATE: 89 BPM | BODY MASS INDEX: 29 KG/M2 | OXYGEN SATURATION: 97 %

## 2025-09-05 DIAGNOSIS — R93.7 ABNORMAL MRI, LUMBAR SPINE: ICD-10-CM

## 2025-09-05 DIAGNOSIS — M54.42 CHRONIC LEFT-SIDED LOW BACK PAIN WITH LEFT-SIDED SCIATICA: ICD-10-CM

## 2025-09-05 DIAGNOSIS — M51.16 LUMBAR DISC DISEASE WITH RADICULOPATHY: Primary | ICD-10-CM

## 2025-09-05 DIAGNOSIS — G89.29 CHRONIC LEFT-SIDED LOW BACK PAIN WITH LEFT-SIDED SCIATICA: ICD-10-CM

## 2025-09-05 RX ORDER — OXYCODONE AND ACETAMINOPHEN 5; 325 MG/1; MG/1
1 TABLET ORAL EVERY 8 HOURS PRN
Qty: 21 TABLET | Refills: 0 | Status: SHIPPED | OUTPATIENT
Start: 2025-09-05 | End: 2025-09-12

## 2025-09-05 RX ORDER — METHYLPREDNISOLONE 4 MG/1
TABLET ORAL
Qty: 1 KIT | Refills: 0 | Status: SHIPPED | OUTPATIENT
Start: 2025-09-05 | End: 2025-09-11

## 2025-09-05 RX ORDER — KETOROLAC TROMETHAMINE 30 MG/ML
60 INJECTION, SOLUTION INTRAMUSCULAR; INTRAVENOUS ONCE
Status: COMPLETED | OUTPATIENT
Start: 2025-09-05 | End: 2025-09-05

## 2025-09-05 RX ADMIN — KETOROLAC TROMETHAMINE 60 MG: 30 INJECTION, SOLUTION INTRAMUSCULAR; INTRAVENOUS at 13:41

## 2025-09-05 ASSESSMENT — ENCOUNTER SYMPTOMS
EYES NEGATIVE: 1
ALLERGIC/IMMUNOLOGIC NEGATIVE: 1
BACK PAIN: 1
RESPIRATORY NEGATIVE: 1
GASTROINTESTINAL NEGATIVE: 1

## (undated) DEVICE — MHPB BASIC LAP PACK: Brand: MEDLINE INDUSTRIES, INC.

## (undated) DEVICE — OPTIFOAM GENTLE AG,POST-OP STRIP,3.5X14: Brand: MEDLINE

## (undated) DEVICE — ENDO KIT W/SYRINGE: Brand: MEDLINE INDUSTRIES, INC.

## (undated) DEVICE — FORCEPS BX L240CM JAW DIA2.4MM ORNG L CAP W/ NDL DISP RAD

## (undated) DEVICE — 3M™ IOBAN™ 2 ANTIMICROBIAL INCISE DRAPE 6650EZ: Brand: IOBAN™ 2

## (undated) DEVICE — ADHESIVE SKIN CLSR 0.7ML TOP DERMBND ADV

## (undated) DEVICE — SUTURE SZ 0 27IN 5/8 CIR UR-6  TAPER PT VIOLET ABSRB VICRYL J603H

## (undated) DEVICE — ADAPTER CLEANING PORPOISE CLEANING

## (undated) DEVICE — BITEBLOCK 54FR W/ DENT RIM BLOX

## (undated) DEVICE — 450 ML BOTTLE OF 0.05% CHLORHEXIDINE GLUCONATE IN 99.95% STERILE WATER FOR IRRIGATION, USP AND APPLICATOR.: Brand: IRRISEPT ANTIMICROBIAL WOUND LAVAGE

## (undated) DEVICE — OSCILLATING TIP SAW CARTRIDGE: Brand: PRECISION FALCON

## (undated) DEVICE — SUTURE PDS II SZ 0 L60IN ABSRB VLT L48MM CTX 1/2 CIR Z990G

## (undated) DEVICE — STRAP,POSITIONING,KNEE/BODY,FOAM,4X60": Brand: MEDLINE

## (undated) DEVICE — ELECTRODE PT RET AD L9FT HI MOIST COND ADH HYDRGEL CORDED

## (undated) DEVICE — CEMENT MIXING SYSTEM WITH FEMORAL BREAKWAY NOZZLE: Brand: REVOLUTION

## (undated) DEVICE — KIT CONN AIR H2O SUCT BX VLV AUX H2O JET DISP FOR OLY

## (undated) DEVICE — SUTURE ABSORBABLE MONOFILAMENT 1 CTX 36 CM 48 MM VIO PDS +

## (undated) DEVICE — TOWEL,OR,DSP,ST,NATURAL,DLX,4/PK,20PK/CS: Brand: MEDLINE

## (undated) DEVICE — CANNULA SEAL

## (undated) DEVICE — TROCAR: Brand: KII FIOS FIRST ENTRY

## (undated) DEVICE — BLADE SAW L510MM S STL GIGLI FOR BNE CUT

## (undated) DEVICE — ARM DRAPE

## (undated) DEVICE — STERILE PATIENT PROTECTIVE PAD FOR IMP® KNEE POSITIONERS & COHESIVE WRAP (10 / CASE): Brand: DE MAYO KNEE POSITIONER®

## (undated) DEVICE — HYPODERMIC SAFETY NEEDLE: Brand: MAGELLAN

## (undated) DEVICE — SPONGE GZ W4XL4IN RAYON POLY FILL CVR W/ NONWOVEN FAB

## (undated) DEVICE — PAD,NON-ADHERENT,3X8,STERILE,LF,1/PK: Brand: MEDLINE

## (undated) DEVICE — SUTURE ABSORBABLE MONOFILAMENT 2-0 CT-1 24 CM 36 MM VIO PDS+

## (undated) DEVICE — Device

## (undated) DEVICE — RESERVOIR,SUCTION,100CC,SILICONE: Brand: MEDLINE

## (undated) DEVICE — SPONGE LAP SOFT 18X18 IN X RAY DETECTABLE

## (undated) DEVICE — SUTURE STRATAFIX SPRL SZ 3-0 L5IN ABSRB UD FS L26MM 3/8 CIR SXMP2B411

## (undated) DEVICE — DRAPE,REIN 53X77,STERILE: Brand: MEDLINE

## (undated) DEVICE — SUTURE MCRYL SZ 2-0 L27IN ABSRB UD SH L26MM TAPERPOINT NDL Y417H

## (undated) DEVICE — COVER LT HNDL BLU PLAS

## (undated) DEVICE — DRAPE,U/ SHT,SPLIT,PLAS,STERIL: Brand: MEDLINE

## (undated) DEVICE — BINDER ABD CNTRCT CLOSURE XL 62-72 IN AD 12 IN UNISX PROCARE

## (undated) DEVICE — CHLORAPREP 26ML ORANGE

## (undated) DEVICE — CONTAINER,SPECIMEN,OR STERILE,4OZ: Brand: MEDLINE

## (undated) DEVICE — TIP COVER ACCESSORY

## (undated) DEVICE — MANIFOLD SUCT 4 PRT 2 CANSTR FLTR DISP NEPTUNE 2

## (undated) DEVICE — BLANKET WRM W29.9XL79.1IN UP BODY FORC AIR MISTRAL-AIR

## (undated) DEVICE — SYRINGE MED 30ML STD CLR PLAS LUERLOCK TIP N CTRL DISP

## (undated) DEVICE — GLOVE ORANGE PI 7   MSG9070

## (undated) DEVICE — IMPLANTABLE DEVICE
Type: IMPLANTABLE DEVICE | Site: KNEE | Status: NON-FUNCTIONAL
Brand: NEXGEN®
Removed: 2024-11-26

## (undated) DEVICE — MASTISOL ADHESIVE AMPULE

## (undated) DEVICE — PROTECTOR ULN NRV PUR FOAM HK LOOP STRP ANATOMICALLY

## (undated) DEVICE — RETRIEVAL DEVICE: Brand: RESCUENET™

## (undated) DEVICE — BLADELESS OBTURATOR: Brand: WECK VISTA

## (undated) DEVICE — DRAIN SURG 19FR 100% SIL RADPQ RND CHN FULL FLUT

## (undated) DEVICE — DRAPE EQUIP XR CASSETTE LG 24.5X23.5 IN LF

## (undated) DEVICE — RESCUE COMBO FORCEPS

## (undated) DEVICE — SUTURE NONABSORBABLE MONOFILAMENT 3-0 PS-1 18 IN BLK ETHILON 1663H

## (undated) DEVICE — DRESSING TRNSPAR FLM 2.4X2.8IN SURESITE 123

## (undated) DEVICE — SOLUTION IV IRRIG POUR BRL 0.9% SODIUM CHL 2F7124

## (undated) DEVICE — 4-PORT MANIFOLD: Brand: NEPTUNE 2

## (undated) DEVICE — SOLUTION ANTIFOG VIS SYS CLEARIFY LAPSCP

## (undated) DEVICE — PRECISION THIN (9.0 X 0.38 X 25.0MM)

## (undated) DEVICE — PLUMEPORT LAPAROSCOPIC SMOKE FILTRATION DEVICE: Brand: PLUMEPORT ACTIV

## (undated) DEVICE — NEEDLE INSUF L120MM DIA2MM DISP FOR PNEUMOPERI ENDOPATH

## (undated) DEVICE — SOLUTION IRRIG 1000ML 0.9% SOD CHL USP POUR PLAS BTL

## (undated) DEVICE — GOWN,AURORA,NONRNF,XL,30/CS: Brand: MEDLINE

## (undated) DEVICE — SUTURE VCRL SZ 2-0 L27IN ABSRB UD L26MM CT-2 1/2 CIR J269H

## (undated) DEVICE — PENCIL ES L3M BTTN SWCH HOLSTER W/ BLDE ELECTRD EDGE

## (undated) DEVICE — COVER,TABLE,HEAVY DUTY,50"X90",STRL: Brand: MEDLINE

## (undated) DEVICE — SOLUTION IRRIG 3000ML 0.9% SOD CHL USP UROMATIC PLAS CONT

## (undated) DEVICE — RECIPROCATING BLADE, DOUBLE SIDED, OFFSET  (70.0 X 0.8 X 12.5MM)

## (undated) DEVICE — DRESSING TRNSPAR W5XL4.5IN FLM SHT SEMIPERMEABLE WIND

## (undated) DEVICE — TRAP POLYP SGL FOR SAFE SPEC HANDLING TRAPEASE

## (undated) DEVICE — STRIP,CLOSURE,WOUND,MEDI-STRIP,1/2X4: Brand: MEDLINE

## (undated) DEVICE — SUTURE VCRL + 1 L36IN ABSRB VLT CT L40MM 1/2 CIR TAPERPOINT VCP359H

## (undated) DEVICE — SYRINGE IRRIG 60ML SFT PLIABLE BLB EZ TO GRP 1 HND USE W/

## (undated) DEVICE — 40580 - THE PINK PAD - ADVANCED TRENDELENBURG POSITIONING KIT: Brand: 40580 - THE PINK PAD - ADVANCED TRENDELENBURG POSITIONING KIT

## (undated) DEVICE — GLOVE SURG SZ 85 CRM LTX FREE POLYISOPRENE POLYMER BEAD ANTI

## (undated) DEVICE — SUTURE MCRYL + SZ 4-0 L27IN ABSRB UD L19MM PS-2 3/8 CIR MCP426H